# Patient Record
Sex: MALE | Race: BLACK OR AFRICAN AMERICAN | NOT HISPANIC OR LATINO | Employment: OTHER | URBAN - METROPOLITAN AREA
[De-identification: names, ages, dates, MRNs, and addresses within clinical notes are randomized per-mention and may not be internally consistent; named-entity substitution may affect disease eponyms.]

---

## 2023-01-01 ENCOUNTER — HOSPITAL ENCOUNTER (INPATIENT)
Facility: HOSPITAL | Age: 88
LOS: 12 days | Discharge: NURSING, REHAB OR LONG-TERM CARE | DRG: 871 | End: 2023-04-05
Attending: EMERGENCY MEDICINE | Admitting: INTERNAL MEDICINE
Payer: MEDICARE

## 2023-01-01 ENCOUNTER — APPOINTMENT (OUTPATIENT)
Dept: GENERAL RADIOLOGY | Facility: HOSPITAL | Age: 88
DRG: 871 | End: 2023-01-01
Payer: MEDICARE

## 2023-01-01 ENCOUNTER — HOSPITAL ENCOUNTER (INPATIENT)
Facility: HOSPITAL | Age: 88
LOS: 4 days | DRG: 871 | End: 2023-04-21
Attending: EMERGENCY MEDICINE | Admitting: INTERNAL MEDICINE
Payer: MEDICARE

## 2023-01-01 ENCOUNTER — HOSPITAL ENCOUNTER (INPATIENT)
Facility: HOSPITAL | Age: 88
LOS: 2 days | DRG: 951 | End: 2023-04-23
Attending: INTERNAL MEDICINE | Admitting: INTERNAL MEDICINE
Payer: COMMERCIAL

## 2023-01-01 ENCOUNTER — APPOINTMENT (OUTPATIENT)
Dept: CARDIOLOGY | Facility: HOSPITAL | Age: 88
DRG: 871 | End: 2023-01-01
Payer: MEDICARE

## 2023-01-01 ENCOUNTER — APPOINTMENT (OUTPATIENT)
Dept: CT IMAGING | Facility: HOSPITAL | Age: 88
DRG: 871 | End: 2023-01-01
Payer: MEDICARE

## 2023-01-01 ENCOUNTER — APPOINTMENT (OUTPATIENT)
Dept: MRI IMAGING | Facility: HOSPITAL | Age: 88
DRG: 871 | End: 2023-01-01
Payer: MEDICARE

## 2023-01-01 VITALS
WEIGHT: 190.7 LBS | OXYGEN SATURATION: 96 % | SYSTOLIC BLOOD PRESSURE: 104 MMHG | TEMPERATURE: 97.3 F | HEIGHT: 69 IN | BODY MASS INDEX: 28.24 KG/M2 | HEART RATE: 68 BPM | RESPIRATION RATE: 16 BRPM | DIASTOLIC BLOOD PRESSURE: 55 MMHG

## 2023-01-01 VITALS
RESPIRATION RATE: 16 BRPM | SYSTOLIC BLOOD PRESSURE: 103 MMHG | WEIGHT: 190 LBS | BODY MASS INDEX: 28.14 KG/M2 | TEMPERATURE: 96.8 F | HEART RATE: 109 BPM | HEIGHT: 69 IN | OXYGEN SATURATION: 93 % | DIASTOLIC BLOOD PRESSURE: 63 MMHG

## 2023-01-01 VITALS — HEART RATE: 89 BPM | RESPIRATION RATE: 5 BRPM

## 2023-01-01 DIAGNOSIS — N39.0 SEPSIS SECONDARY TO UTI: Primary | ICD-10-CM

## 2023-01-01 DIAGNOSIS — M10.9 GOUT, UNSPECIFIED CAUSE, UNSPECIFIED CHRONICITY, UNSPECIFIED SITE: ICD-10-CM

## 2023-01-01 DIAGNOSIS — Z86.59 HISTORY OF DEMENTIA: ICD-10-CM

## 2023-01-01 DIAGNOSIS — A41.9 SEPSIS, DUE TO UNSPECIFIED ORGANISM, UNSPECIFIED WHETHER ACUTE ORGAN DYSFUNCTION PRESENT: Primary | ICD-10-CM

## 2023-01-01 DIAGNOSIS — J18.9 HCAP (HEALTHCARE-ASSOCIATED PNEUMONIA): ICD-10-CM

## 2023-01-01 DIAGNOSIS — R13.10 DYSPHAGIA, UNSPECIFIED TYPE: ICD-10-CM

## 2023-01-01 DIAGNOSIS — A41.9 SEPSIS SECONDARY TO UTI: Primary | ICD-10-CM

## 2023-01-01 DIAGNOSIS — I48.91 ATRIAL FIBRILLATION WITH RVR: ICD-10-CM

## 2023-01-01 DIAGNOSIS — I48.91 NEW ONSET ATRIAL FIBRILLATION: ICD-10-CM

## 2023-01-01 DIAGNOSIS — D64.9 ANEMIA, UNSPECIFIED TYPE: ICD-10-CM

## 2023-01-01 DIAGNOSIS — R41.0 CONFUSION: ICD-10-CM

## 2023-01-01 LAB
ABO GROUP BLD: NORMAL
ABO GROUP BLD: NORMAL
ACANTHOCYTES BLD QL SMEAR: NORMAL
ALBUMIN SERPL-MCNC: 2.2 G/DL (ref 3.5–5.2)
ALBUMIN SERPL-MCNC: 2.3 G/DL (ref 3.5–5.2)
ALBUMIN SERPL-MCNC: 2.4 G/DL (ref 3.5–5.2)
ALBUMIN SERPL-MCNC: 2.6 G/DL (ref 3.5–5.2)
ALBUMIN SERPL-MCNC: 2.7 G/DL (ref 3.5–5.2)
ALBUMIN/GLOB SERPL: 0.8 G/DL
ALBUMIN/GLOB SERPL: 0.9 G/DL
ALBUMIN/GLOB SERPL: 1 G/DL
ALBUMIN/GLOB SERPL: 1 G/DL
ALBUMIN/GLOB SERPL: 1.2 G/DL
ALP SERPL-CCNC: 1111 U/L (ref 39–117)
ALP SERPL-CCNC: 1179 U/L (ref 39–117)
ALP SERPL-CCNC: 1197 U/L (ref 39–117)
ALP SERPL-CCNC: 1524 U/L (ref 39–117)
ALP SERPL-CCNC: 750 U/L (ref 39–117)
ALT SERPL W P-5'-P-CCNC: 5 U/L (ref 1–41)
ALT SERPL W P-5'-P-CCNC: 7 U/L (ref 1–41)
ALT SERPL W P-5'-P-CCNC: 7 U/L (ref 1–41)
ALT SERPL W P-5'-P-CCNC: 8 U/L (ref 1–41)
ALT SERPL W P-5'-P-CCNC: 9 U/L (ref 1–41)
AMMONIA BLD-SCNC: 46 UMOL/L (ref 16–60)
AMPHET+METHAMPHET UR QL: NEGATIVE
AMPHETAMINES UR QL: NEGATIVE
ANION GAP SERPL CALCULATED.3IONS-SCNC: 11 MMOL/L (ref 5–15)
ANION GAP SERPL CALCULATED.3IONS-SCNC: 12 MMOL/L (ref 5–15)
ANION GAP SERPL CALCULATED.3IONS-SCNC: 13 MMOL/L (ref 5–15)
ANION GAP SERPL CALCULATED.3IONS-SCNC: 13 MMOL/L (ref 5–15)
ANION GAP SERPL CALCULATED.3IONS-SCNC: 14 MMOL/L (ref 5–15)
ANION GAP SERPL CALCULATED.3IONS-SCNC: 15 MMOL/L (ref 5–15)
ANION GAP SERPL CALCULATED.3IONS-SCNC: 15 MMOL/L (ref 5–15)
ANION GAP SERPL CALCULATED.3IONS-SCNC: 18 MMOL/L (ref 5–15)
ANION GAP SERPL CALCULATED.3IONS-SCNC: 21 MMOL/L (ref 5–15)
ANION GAP SERPL CALCULATED.3IONS-SCNC: 9 MMOL/L (ref 5–15)
ANISOCYTOSIS BLD QL: NORMAL
ANISOCYTOSIS BLD QL: NORMAL
APAP SERPL-MCNC: <5 MCG/ML (ref 0–30)
ARTERIAL PATENCY WRIST A: ABNORMAL
ARTERIAL PATENCY WRIST A: ABNORMAL
AST SERPL-CCNC: 12 U/L (ref 1–40)
AST SERPL-CCNC: 19 U/L (ref 1–40)
AST SERPL-CCNC: 23 U/L (ref 1–40)
AST SERPL-CCNC: 23 U/L (ref 1–40)
AST SERPL-CCNC: 25 U/L (ref 1–40)
ATMOSPHERIC PRESS: ABNORMAL MM[HG]
B PARAPERT DNA SPEC QL NAA+PROBE: NOT DETECTED
B PERT DNA SPEC QL NAA+PROBE: NOT DETECTED
BACTERIA SPEC AEROBE CULT: ABNORMAL
BACTERIA SPEC AEROBE CULT: NO GROWTH
BACTERIA SPEC AEROBE CULT: NORMAL
BACTERIA UR QL AUTO: ABNORMAL /HPF
BACTERIA UR QL AUTO: ABNORMAL /HPF
BARBITURATES UR QL SCN: NEGATIVE
BASE EXCESS BLDA CALC-SCNC: -2.9 MMOL/L (ref 0–2)
BASE EXCESS BLDA CALC-SCNC: -6.6 MMOL/L (ref 0–2)
BASE EXCESS BLDV CALC-SCNC: -4.4 MMOL/L (ref -2–2)
BASOPHILS # BLD AUTO: 0.02 10*3/MM3 (ref 0–0.2)
BASOPHILS # BLD AUTO: 0.03 10*3/MM3 (ref 0–0.2)
BASOPHILS # BLD AUTO: 0.04 10*3/MM3 (ref 0–0.2)
BASOPHILS # BLD AUTO: 0.04 10*3/MM3 (ref 0–0.2)
BASOPHILS NFR BLD AUTO: 0.3 % (ref 0–1.5)
BASOPHILS NFR BLD AUTO: 0.4 % (ref 0–1.5)
BASOPHILS NFR BLD AUTO: 0.4 % (ref 0–1.5)
BASOPHILS NFR BLD AUTO: 0.6 % (ref 0–1.5)
BASOPHILS NFR BLD AUTO: 0.6 % (ref 0–1.5)
BASOPHILS NFR BLD AUTO: 0.7 % (ref 0–1.5)
BASOPHILS NFR BLD AUTO: 0.8 % (ref 0–1.5)
BDY SITE: ABNORMAL
BENZODIAZ UR QL SCN: POSITIVE
BH BB BLOOD EXPIRATION DATE: NORMAL
BH BB BLOOD TYPE BARCODE: 9500
BH BB DISPENSE STATUS: NORMAL
BH BB PRODUCT CODE: NORMAL
BH BB UNIT NUMBER: NORMAL
BH CV ECHO MEAS - AO MAX PG: 8.1 MMHG
BH CV ECHO MEAS - AO MEAN PG: 3.3 MMHG
BH CV ECHO MEAS - AO ROOT DIAM: 4.3 CM
BH CV ECHO MEAS - AO V2 MAX: 142.5 CM/SEC
BH CV ECHO MEAS - AO V2 VTI: 22.2 CM
BH CV ECHO MEAS - AVA(I,D): 1.14 CM2
BH CV ECHO MEAS - EDV(CUBED): 27.7 ML
BH CV ECHO MEAS - ESV(CUBED): 8.8 ML
BH CV ECHO MEAS - FS: 31.8 %
BH CV ECHO MEAS - IVS/LVPW: 0.99 CM
BH CV ECHO MEAS - IVSD: 1.06 CM
BH CV ECHO MEAS - LA DIMENSION: 3.3 CM
BH CV ECHO MEAS - LAT PEAK E' VEL: 9.2 CM/SEC
BH CV ECHO MEAS - LV MASS(C)D: 91.5 GRAMS
BH CV ECHO MEAS - LV MAX PG: 1.84 MMHG
BH CV ECHO MEAS - LV MEAN PG: 0.63 MMHG
BH CV ECHO MEAS - LV V1 MAX: 67.9 CM/SEC
BH CV ECHO MEAS - LV V1 VTI: 7.7 CM
BH CV ECHO MEAS - LVIDD: 3 CM
BH CV ECHO MEAS - LVIDS: 2.06 CM
BH CV ECHO MEAS - LVOT AREA: 3.3 CM2
BH CV ECHO MEAS - LVOT DIAM: 2.05 CM
BH CV ECHO MEAS - LVPWD: 1.07 CM
BH CV ECHO MEAS - MED PEAK E' VEL: 6.3 CM/SEC
BH CV ECHO MEAS - MV DEC SLOPE: 763.3 CM/SEC2
BH CV ECHO MEAS - MV E MAX VEL: 107 CM/SEC
BH CV ECHO MEAS - MV MAX PG: 6.2 MMHG
BH CV ECHO MEAS - MV MEAN PG: 3.3 MMHG
BH CV ECHO MEAS - MV P1/2T: 49.5 MSEC
BH CV ECHO MEAS - MV V2 VTI: 26 CM
BH CV ECHO MEAS - MVA(P1/2T): 4.4 CM2
BH CV ECHO MEAS - MVA(VTI): 0.97 CM2
BH CV ECHO MEAS - PI END-D VEL: 156.1 CM/SEC
BH CV ECHO MEAS - RAP SYSTOLE: 8 MMHG
BH CV ECHO MEAS - RVSP: 37 MMHG
BH CV ECHO MEAS - SV(LVOT): 25.2 ML
BH CV ECHO MEAS - TAPSE (>1.6): 0.98 CM
BH CV ECHO MEAS - TR MAX PG: 29.7 MMHG
BH CV ECHO MEAS - TR MAX VEL: 271.7 CM/SEC
BH CV ECHO MEASUREMENTS AVERAGE E/E' RATIO: 13.81
BH CV LOW VAS RIGHT POSTERIOR TIBIAL VESSEL: 1
BH CV LOWER VASCULAR LEFT COMMON FEMORAL AUGMENT: NORMAL
BH CV LOWER VASCULAR LEFT COMMON FEMORAL COMPETENT: NORMAL
BH CV LOWER VASCULAR LEFT COMMON FEMORAL COMPRESS: NORMAL
BH CV LOWER VASCULAR LEFT COMMON FEMORAL PHASIC: NORMAL
BH CV LOWER VASCULAR LEFT COMMON FEMORAL SPONT: NORMAL
BH CV LOWER VASCULAR LEFT DISTAL FEMORAL COMPRESS: NORMAL
BH CV LOWER VASCULAR LEFT GASTRONEMIUS COMPRESS: NORMAL
BH CV LOWER VASCULAR LEFT GREATER SAPH AK COMPRESS: NORMAL
BH CV LOWER VASCULAR LEFT GREATER SAPH BK COMPRESS: NORMAL
BH CV LOWER VASCULAR LEFT LESSER SAPH COMPRESS: NORMAL
BH CV LOWER VASCULAR LEFT MID FEMORAL AUGMENT: NORMAL
BH CV LOWER VASCULAR LEFT MID FEMORAL COMPETENT: NORMAL
BH CV LOWER VASCULAR LEFT MID FEMORAL COMPRESS: NORMAL
BH CV LOWER VASCULAR LEFT MID FEMORAL PHASIC: NORMAL
BH CV LOWER VASCULAR LEFT MID FEMORAL SPONT: NORMAL
BH CV LOWER VASCULAR LEFT PERONEAL COMPRESS: NORMAL
BH CV LOWER VASCULAR LEFT POPLITEAL AUGMENT: NORMAL
BH CV LOWER VASCULAR LEFT POPLITEAL COMPETENT: NORMAL
BH CV LOWER VASCULAR LEFT POPLITEAL COMPRESS: NORMAL
BH CV LOWER VASCULAR LEFT POPLITEAL PHASIC: NORMAL
BH CV LOWER VASCULAR LEFT POPLITEAL SPONT: NORMAL
BH CV LOWER VASCULAR LEFT POSTERIOR TIBIAL COMPRESS: NORMAL
BH CV LOWER VASCULAR LEFT PROFUNDA FEMORAL COMPRESS: NORMAL
BH CV LOWER VASCULAR LEFT PROXIMAL FEMORAL COMPRESS: NORMAL
BH CV LOWER VASCULAR LEFT SAPHENOFEMORAL JUNCTION COMPRESS: NORMAL
BH CV LOWER VASCULAR RIGHT COMMON FEMORAL AUGMENT: NORMAL
BH CV LOWER VASCULAR RIGHT COMMON FEMORAL COMPETENT: NORMAL
BH CV LOWER VASCULAR RIGHT COMMON FEMORAL COMPRESS: NORMAL
BH CV LOWER VASCULAR RIGHT COMMON FEMORAL PHASIC: NORMAL
BH CV LOWER VASCULAR RIGHT COMMON FEMORAL SPONT: NORMAL
BH CV LOWER VASCULAR RIGHT DISTAL FEMORAL COMPRESS: NORMAL
BH CV LOWER VASCULAR RIGHT GASTRONEMIUS COMPRESS: NORMAL
BH CV LOWER VASCULAR RIGHT GREATER SAPH AK COMPRESS: NORMAL
BH CV LOWER VASCULAR RIGHT GREATER SAPH BK COMPRESS: NORMAL
BH CV LOWER VASCULAR RIGHT LESSER SAPH COMPRESS: NORMAL
BH CV LOWER VASCULAR RIGHT MID FEMORAL AUGMENT: NORMAL
BH CV LOWER VASCULAR RIGHT MID FEMORAL COMPETENT: NORMAL
BH CV LOWER VASCULAR RIGHT MID FEMORAL COMPRESS: NORMAL
BH CV LOWER VASCULAR RIGHT MID FEMORAL PHASIC: NORMAL
BH CV LOWER VASCULAR RIGHT MID FEMORAL SPONT: NORMAL
BH CV LOWER VASCULAR RIGHT PERONEAL COMPRESS: NORMAL
BH CV LOWER VASCULAR RIGHT POPLITEAL AUGMENT: NORMAL
BH CV LOWER VASCULAR RIGHT POPLITEAL COMPETENT: NORMAL
BH CV LOWER VASCULAR RIGHT POPLITEAL COMPRESS: NORMAL
BH CV LOWER VASCULAR RIGHT POPLITEAL PHASIC: NORMAL
BH CV LOWER VASCULAR RIGHT POPLITEAL SPONT: NORMAL
BH CV LOWER VASCULAR RIGHT POSTERIOR TIBIAL COMPRESS: NORMAL
BH CV LOWER VASCULAR RIGHT POSTERIOR TIBIAL THROMBUS: NORMAL
BH CV LOWER VASCULAR RIGHT PROFUNDA FEMORAL COMPRESS: NORMAL
BH CV LOWER VASCULAR RIGHT PROXIMAL FEMORAL COMPRESS: NORMAL
BH CV LOWER VASCULAR RIGHT SAPHENOFEMORAL JUNCTION COMPRESS: NORMAL
BH CV XLRA - TDI S': 10.6 CM/SEC
BILIRUB SERPL-MCNC: 0.8 MG/DL (ref 0–1.2)
BILIRUB SERPL-MCNC: 1.2 MG/DL (ref 0–1.2)
BILIRUB SERPL-MCNC: 1.2 MG/DL (ref 0–1.2)
BILIRUB SERPL-MCNC: 1.5 MG/DL (ref 0–1.2)
BILIRUB SERPL-MCNC: 1.5 MG/DL (ref 0–1.2)
BILIRUB UR QL STRIP: ABNORMAL
BILIRUB UR QL STRIP: ABNORMAL
BLD GP AB SCN SERPL QL: NEGATIVE
BODY TEMPERATURE: 37 C
BUN SERPL-MCNC: 13 MG/DL (ref 8–23)
BUN SERPL-MCNC: 14 MG/DL (ref 8–23)
BUN SERPL-MCNC: 15 MG/DL (ref 8–23)
BUN SERPL-MCNC: 15 MG/DL (ref 8–23)
BUN SERPL-MCNC: 16 MG/DL (ref 8–23)
BUN SERPL-MCNC: 16 MG/DL (ref 8–23)
BUN SERPL-MCNC: 18 MG/DL (ref 8–23)
BUN SERPL-MCNC: 19 MG/DL (ref 8–23)
BUN SERPL-MCNC: 19 MG/DL (ref 8–23)
BUN SERPL-MCNC: 21 MG/DL (ref 8–23)
BUN SERPL-MCNC: 28 MG/DL (ref 8–23)
BUN SERPL-MCNC: 29 MG/DL (ref 8–23)
BUN SERPL-MCNC: 30 MG/DL (ref 8–23)
BUN/CREAT SERPL: 16.2 (ref 7–25)
BUN/CREAT SERPL: 16.3 (ref 7–25)
BUN/CREAT SERPL: 16.3 (ref 7–25)
BUN/CREAT SERPL: 18 (ref 7–25)
BUN/CREAT SERPL: 19.1 (ref 7–25)
BUN/CREAT SERPL: 19.4 (ref 7–25)
BUN/CREAT SERPL: 19.7 (ref 7–25)
BUN/CREAT SERPL: 20 (ref 7–25)
BUN/CREAT SERPL: 20.3 (ref 7–25)
BUN/CREAT SERPL: 20.7 (ref 7–25)
BUN/CREAT SERPL: 20.7 (ref 7–25)
BUN/CREAT SERPL: 21.2 (ref 7–25)
BUN/CREAT SERPL: 21.5 (ref 7–25)
BUN/CREAT SERPL: 21.6 (ref 7–25)
BUN/CREAT SERPL: 23.1 (ref 7–25)
BUPRENORPHINE SERPL-MCNC: NEGATIVE NG/ML
BURR CELLS BLD QL SMEAR: NORMAL
BURR CELLS BLD QL SMEAR: NORMAL
C PNEUM DNA NPH QL NAA+NON-PROBE: NOT DETECTED
CALCIUM SPEC-SCNC: 7.7 MG/DL (ref 8.2–9.6)
CALCIUM SPEC-SCNC: 8 MG/DL (ref 8.2–9.6)
CALCIUM SPEC-SCNC: 8.1 MG/DL (ref 8.2–9.6)
CALCIUM SPEC-SCNC: 8.1 MG/DL (ref 8.2–9.6)
CALCIUM SPEC-SCNC: 8.2 MG/DL (ref 8.2–9.6)
CALCIUM SPEC-SCNC: 8.4 MG/DL (ref 8.2–9.6)
CALCIUM SPEC-SCNC: 8.6 MG/DL (ref 8.2–9.6)
CALCIUM SPEC-SCNC: 8.6 MG/DL (ref 8.2–9.6)
CALCIUM SPEC-SCNC: 8.8 MG/DL (ref 8.2–9.6)
CALCIUM SPEC-SCNC: 9 MG/DL (ref 8.2–9.6)
CANNABINOIDS SERPL QL: NEGATIVE
CHLORIDE SERPL-SCNC: 103 MMOL/L (ref 98–107)
CHLORIDE SERPL-SCNC: 103 MMOL/L (ref 98–107)
CHLORIDE SERPL-SCNC: 107 MMOL/L (ref 98–107)
CHLORIDE SERPL-SCNC: 108 MMOL/L (ref 98–107)
CHLORIDE SERPL-SCNC: 109 MMOL/L (ref 98–107)
CHLORIDE SERPL-SCNC: 111 MMOL/L (ref 98–107)
CHLORIDE SERPL-SCNC: 112 MMOL/L (ref 98–107)
CHLORIDE SERPL-SCNC: 113 MMOL/L (ref 98–107)
CHLORIDE SERPL-SCNC: 113 MMOL/L (ref 98–107)
CHLORIDE SERPL-SCNC: 114 MMOL/L (ref 98–107)
CHLORIDE SERPL-SCNC: 114 MMOL/L (ref 98–107)
CK SERPL-CCNC: 65 U/L (ref 20–200)
CLARITY UR: ABNORMAL
CLARITY UR: CLEAR
CO2 BLDA-SCNC: 17.2 MMOL/L (ref 22–33)
CO2 BLDA-SCNC: 22 MMOL/L (ref 22–33)
CO2 BLDA-SCNC: 23.4 MMOL/L (ref 22–33)
CO2 SERPL-SCNC: 15 MMOL/L (ref 22–29)
CO2 SERPL-SCNC: 16 MMOL/L (ref 22–29)
CO2 SERPL-SCNC: 17 MMOL/L (ref 22–29)
CO2 SERPL-SCNC: 18 MMOL/L (ref 22–29)
CO2 SERPL-SCNC: 18 MMOL/L (ref 22–29)
CO2 SERPL-SCNC: 19 MMOL/L (ref 22–29)
CO2 SERPL-SCNC: 20 MMOL/L (ref 22–29)
CO2 SERPL-SCNC: 22 MMOL/L (ref 22–29)
CO2 SERPL-SCNC: 24 MMOL/L (ref 22–29)
COCAINE UR QL: NEGATIVE
COHGB MFR BLD: 1.2 % (ref 0–2)
COHGB MFR BLD: 1.4 %
COHGB MFR BLD: 1.7 % (ref 0–2)
COLOR UR: ABNORMAL
COLOR UR: YELLOW
CORTIS SERPL-MCNC: 11.44 MCG/DL
CORTIS SERPL-MCNC: 20.04 MCG/DL
CORTIS SERPL-MCNC: 22.27 MCG/DL
CREAT SERPL-MCNC: 0.65 MG/DL (ref 0.76–1.27)
CREAT SERPL-MCNC: 0.65 MG/DL (ref 0.76–1.27)
CREAT SERPL-MCNC: 0.66 MG/DL (ref 0.76–1.27)
CREAT SERPL-MCNC: 0.72 MG/DL (ref 0.76–1.27)
CREAT SERPL-MCNC: 0.75 MG/DL (ref 0.76–1.27)
CREAT SERPL-MCNC: 0.86 MG/DL (ref 0.76–1.27)
CREAT SERPL-MCNC: 0.88 MG/DL (ref 0.76–1.27)
CREAT SERPL-MCNC: 0.89 MG/DL (ref 0.76–1.27)
CREAT SERPL-MCNC: 0.92 MG/DL (ref 0.76–1.27)
CREAT SERPL-MCNC: 0.94 MG/DL (ref 0.76–1.27)
CREAT SERPL-MCNC: 0.99 MG/DL (ref 0.76–1.27)
CREAT SERPL-MCNC: 0.99 MG/DL (ref 0.76–1.27)
CREAT SERPL-MCNC: 1.4 MG/DL (ref 0.76–1.27)
CREAT SERPL-MCNC: 1.48 MG/DL (ref 0.76–1.27)
CREAT SERPL-MCNC: 1.72 MG/DL (ref 0.76–1.27)
CROSSMATCH INTERPRETATION: NORMAL
D-LACTATE SERPL-SCNC: 1.7 MMOL/L (ref 0.5–2)
D-LACTATE SERPL-SCNC: 2 MMOL/L (ref 0.5–2)
D-LACTATE SERPL-SCNC: 2.1 MMOL/L (ref 0.5–2)
D-LACTATE SERPL-SCNC: 2.3 MMOL/L (ref 0.5–2)
D-LACTATE SERPL-SCNC: 3.6 MMOL/L (ref 0.5–2)
D-LACTATE SERPL-SCNC: 3.6 MMOL/L (ref 0.5–2)
D-LACTATE SERPL-SCNC: 7.3 MMOL/L (ref 0.5–2)
DACRYOCYTES BLD QL SMEAR: NORMAL
DACRYOCYTES BLD QL SMEAR: NORMAL
DEPRECATED RDW RBC AUTO: 100.6 FL (ref 37–54)
DEPRECATED RDW RBC AUTO: 101.7 FL (ref 37–54)
DEPRECATED RDW RBC AUTO: 82.9 FL (ref 37–54)
DEPRECATED RDW RBC AUTO: 83.5 FL (ref 37–54)
DEPRECATED RDW RBC AUTO: 86.5 FL (ref 37–54)
DEPRECATED RDW RBC AUTO: 86.7 FL (ref 37–54)
DEPRECATED RDW RBC AUTO: 87.1 FL (ref 37–54)
DEPRECATED RDW RBC AUTO: 89.9 FL (ref 37–54)
DEPRECATED RDW RBC AUTO: 91.2 FL (ref 37–54)
DEPRECATED RDW RBC AUTO: 91.6 FL (ref 37–54)
DEPRECATED RDW RBC AUTO: 92.2 FL (ref 37–54)
DEPRECATED RDW RBC AUTO: 95.3 FL (ref 37–54)
DEPRECATED RDW RBC AUTO: 98.3 FL (ref 37–54)
DEPRECATED RDW RBC AUTO: 98.9 FL (ref 37–54)
EGFRCR SERPLBLD CKD-EPI 2021: 36.6 ML/MIN/1.73
EGFRCR SERPLBLD CKD-EPI 2021: 43.8 ML/MIN/1.73
EGFRCR SERPLBLD CKD-EPI 2021: 46.9 ML/MIN/1.73
EGFRCR SERPLBLD CKD-EPI 2021: 71 ML/MIN/1.73
EGFRCR SERPLBLD CKD-EPI 2021: 71 ML/MIN/1.73
EGFRCR SERPLBLD CKD-EPI 2021: 75.6 ML/MIN/1.73
EGFRCR SERPLBLD CKD-EPI 2021: 77.6 ML/MIN/1.73
EGFRCR SERPLBLD CKD-EPI 2021: 79.9 ML/MIN/1.73
EGFRCR SERPLBLD CKD-EPI 2021: 80.2 ML/MIN/1.73
EGFRCR SERPLBLD CKD-EPI 2021: 80.7 ML/MIN/1.73
EGFRCR SERPLBLD CKD-EPI 2021: 84.1 ML/MIN/1.73
EGFRCR SERPLBLD CKD-EPI 2021: 85.2 ML/MIN/1.73
EGFRCR SERPLBLD CKD-EPI 2021: 87.5 ML/MIN/1.73
EGFRCR SERPLBLD CKD-EPI 2021: 87.9 ML/MIN/1.73
EGFRCR SERPLBLD CKD-EPI 2021: 87.9 ML/MIN/1.73
EOSINOPHIL # BLD AUTO: 0 10*3/MM3 (ref 0–0.4)
EOSINOPHIL # BLD AUTO: 0.01 10*3/MM3 (ref 0–0.4)
EOSINOPHIL # BLD AUTO: 0.02 10*3/MM3 (ref 0–0.4)
EOSINOPHIL # BLD AUTO: 0.03 10*3/MM3 (ref 0–0.4)
EOSINOPHIL # BLD AUTO: 0.34 10*3/MM3 (ref 0–0.4)
EOSINOPHIL NFR BLD AUTO: 0 % (ref 0.3–6.2)
EOSINOPHIL NFR BLD AUTO: 0.1 % (ref 0.3–6.2)
EOSINOPHIL NFR BLD AUTO: 0.4 % (ref 0.3–6.2)
EOSINOPHIL NFR BLD AUTO: 0.5 % (ref 0.3–6.2)
EOSINOPHIL NFR BLD AUTO: 0.6 % (ref 0.3–6.2)
EOSINOPHIL NFR BLD AUTO: 0.8 % (ref 0.3–6.2)
EOSINOPHIL NFR BLD AUTO: 3.1 % (ref 0.3–6.2)
EPAP: 0
ERYTHROCYTE [DISTWIDTH] IN BLOOD BY AUTOMATED COUNT: 23.4 % (ref 12.3–15.4)
ERYTHROCYTE [DISTWIDTH] IN BLOOD BY AUTOMATED COUNT: 23.4 % (ref 12.3–15.4)
ERYTHROCYTE [DISTWIDTH] IN BLOOD BY AUTOMATED COUNT: 23.5 % (ref 12.3–15.4)
ERYTHROCYTE [DISTWIDTH] IN BLOOD BY AUTOMATED COUNT: 23.6 % (ref 12.3–15.4)
ERYTHROCYTE [DISTWIDTH] IN BLOOD BY AUTOMATED COUNT: 23.7 % (ref 12.3–15.4)
ERYTHROCYTE [DISTWIDTH] IN BLOOD BY AUTOMATED COUNT: 23.7 % (ref 12.3–15.4)
ERYTHROCYTE [DISTWIDTH] IN BLOOD BY AUTOMATED COUNT: 23.8 % (ref 12.3–15.4)
ERYTHROCYTE [DISTWIDTH] IN BLOOD BY AUTOMATED COUNT: 23.9 % (ref 12.3–15.4)
ERYTHROCYTE [DISTWIDTH] IN BLOOD BY AUTOMATED COUNT: 24 % (ref 12.3–15.4)
ERYTHROCYTE [DISTWIDTH] IN BLOOD BY AUTOMATED COUNT: 25.3 % (ref 12.3–15.4)
ERYTHROCYTE [DISTWIDTH] IN BLOOD BY AUTOMATED COUNT: 25.5 % (ref 12.3–15.4)
ERYTHROCYTE [DISTWIDTH] IN BLOOD BY AUTOMATED COUNT: 25.6 % (ref 12.3–15.4)
ERYTHROCYTE [DISTWIDTH] IN BLOOD BY AUTOMATED COUNT: 25.7 % (ref 12.3–15.4)
ERYTHROCYTE [DISTWIDTH] IN BLOOD BY AUTOMATED COUNT: 25.7 % (ref 12.3–15.4)
ERYTHROCYTE [SEDIMENTATION RATE] IN BLOOD: 12 MM/HR (ref 0–20)
ETHANOL BLD-MCNC: <10 MG/DL (ref 0–10)
FLUAV SUBTYP SPEC NAA+PROBE: NOT DETECTED
FLUBV RNA ISLT QL NAA+PROBE: NOT DETECTED
FOLATE SERPL-MCNC: 10.7 NG/ML (ref 4.78–24.2)
FOLATE SERPL-MCNC: >20 NG/ML (ref 4.78–24.2)
GEN 5 2HR TROPONIN T REFLEX: 45 NG/L
GLOBULIN UR ELPH-MCNC: 2.1 GM/DL
GLOBULIN UR ELPH-MCNC: 2.3 GM/DL
GLOBULIN UR ELPH-MCNC: 2.7 GM/DL
GLOBULIN UR ELPH-MCNC: 2.7 GM/DL
GLOBULIN UR ELPH-MCNC: 2.8 GM/DL
GLUCOSE BLDC GLUCOMTR-MCNC: 101 MG/DL (ref 70–130)
GLUCOSE BLDC GLUCOMTR-MCNC: 101 MG/DL (ref 70–130)
GLUCOSE BLDC GLUCOMTR-MCNC: 114 MG/DL (ref 70–130)
GLUCOSE BLDC GLUCOMTR-MCNC: 114 MG/DL (ref 70–130)
GLUCOSE BLDC GLUCOMTR-MCNC: 123 MG/DL (ref 70–130)
GLUCOSE BLDC GLUCOMTR-MCNC: 124 MG/DL (ref 70–130)
GLUCOSE BLDC GLUCOMTR-MCNC: 125 MG/DL (ref 70–130)
GLUCOSE BLDC GLUCOMTR-MCNC: 125 MG/DL (ref 70–130)
GLUCOSE BLDC GLUCOMTR-MCNC: 127 MG/DL (ref 70–130)
GLUCOSE BLDC GLUCOMTR-MCNC: 131 MG/DL (ref 70–130)
GLUCOSE BLDC GLUCOMTR-MCNC: 147 MG/DL (ref 70–130)
GLUCOSE BLDC GLUCOMTR-MCNC: 150 MG/DL (ref 70–130)
GLUCOSE BLDC GLUCOMTR-MCNC: 150 MG/DL (ref 70–130)
GLUCOSE BLDC GLUCOMTR-MCNC: 156 MG/DL (ref 70–130)
GLUCOSE BLDC GLUCOMTR-MCNC: 159 MG/DL (ref 70–130)
GLUCOSE BLDC GLUCOMTR-MCNC: 162 MG/DL (ref 70–130)
GLUCOSE BLDC GLUCOMTR-MCNC: 166 MG/DL (ref 70–130)
GLUCOSE BLDC GLUCOMTR-MCNC: 21 MG/DL (ref 70–130)
GLUCOSE BLDC GLUCOMTR-MCNC: 269 MG/DL (ref 70–130)
GLUCOSE BLDC GLUCOMTR-MCNC: 38 MG/DL (ref 70–130)
GLUCOSE BLDC GLUCOMTR-MCNC: 43 MG/DL (ref 70–130)
GLUCOSE BLDC GLUCOMTR-MCNC: 56 MG/DL (ref 70–130)
GLUCOSE BLDC GLUCOMTR-MCNC: 57 MG/DL (ref 70–130)
GLUCOSE BLDC GLUCOMTR-MCNC: 71 MG/DL (ref 70–130)
GLUCOSE BLDC GLUCOMTR-MCNC: 81 MG/DL (ref 70–130)
GLUCOSE BLDC GLUCOMTR-MCNC: 82 MG/DL (ref 70–130)
GLUCOSE BLDC GLUCOMTR-MCNC: 84 MG/DL (ref 70–130)
GLUCOSE BLDC GLUCOMTR-MCNC: 85 MG/DL (ref 70–130)
GLUCOSE BLDC GLUCOMTR-MCNC: 85 MG/DL (ref 70–130)
GLUCOSE BLDC GLUCOMTR-MCNC: 91 MG/DL (ref 70–130)
GLUCOSE BLDC GLUCOMTR-MCNC: 92 MG/DL (ref 70–130)
GLUCOSE BLDC GLUCOMTR-MCNC: 95 MG/DL (ref 70–130)
GLUCOSE BLDC GLUCOMTR-MCNC: 98 MG/DL (ref 70–130)
GLUCOSE BLDC GLUCOMTR-MCNC: 99 MG/DL (ref 70–130)
GLUCOSE SERPL-MCNC: 112 MG/DL (ref 65–99)
GLUCOSE SERPL-MCNC: 121 MG/DL (ref 65–99)
GLUCOSE SERPL-MCNC: 124 MG/DL (ref 65–99)
GLUCOSE SERPL-MCNC: 135 MG/DL (ref 65–99)
GLUCOSE SERPL-MCNC: 152 MG/DL (ref 65–99)
GLUCOSE SERPL-MCNC: 56 MG/DL (ref 65–99)
GLUCOSE SERPL-MCNC: 60 MG/DL (ref 65–99)
GLUCOSE SERPL-MCNC: 62 MG/DL (ref 65–99)
GLUCOSE SERPL-MCNC: 69 MG/DL (ref 65–99)
GLUCOSE SERPL-MCNC: 73 MG/DL (ref 65–99)
GLUCOSE SERPL-MCNC: 76 MG/DL (ref 65–99)
GLUCOSE SERPL-MCNC: 86 MG/DL (ref 65–99)
GLUCOSE SERPL-MCNC: 92 MG/DL (ref 65–99)
GLUCOSE SERPL-MCNC: 94 MG/DL (ref 65–99)
GLUCOSE SERPL-MCNC: 97 MG/DL (ref 65–99)
GLUCOSE UR STRIP-MCNC: NEGATIVE MG/DL
GLUCOSE UR STRIP-MCNC: NEGATIVE MG/DL
HADV DNA SPEC NAA+PROBE: NOT DETECTED
HCO3 BLDA-SCNC: 16.4 MMOL/L (ref 20–26)
HCO3 BLDA-SCNC: 22.2 MMOL/L (ref 20–26)
HCO3 BLDV-SCNC: 20.8 MMOL/L (ref 22–28)
HCOV 229E RNA SPEC QL NAA+PROBE: NOT DETECTED
HCOV HKU1 RNA SPEC QL NAA+PROBE: NOT DETECTED
HCOV NL63 RNA SPEC QL NAA+PROBE: NOT DETECTED
HCOV OC43 RNA SPEC QL NAA+PROBE: NOT DETECTED
HCT VFR BLD AUTO: 25.4 % (ref 37.5–51)
HCT VFR BLD AUTO: 25.5 % (ref 37.5–51)
HCT VFR BLD AUTO: 25.9 % (ref 37.5–51)
HCT VFR BLD AUTO: 26.3 % (ref 37.5–51)
HCT VFR BLD AUTO: 26.7 % (ref 37.5–51)
HCT VFR BLD AUTO: 27.6 % (ref 37.5–51)
HCT VFR BLD AUTO: 28.2 % (ref 37.5–51)
HCT VFR BLD AUTO: 28.5 % (ref 37.5–51)
HCT VFR BLD AUTO: 28.7 % (ref 37.5–51)
HCT VFR BLD AUTO: 29.4 % (ref 37.5–51)
HCT VFR BLD AUTO: 30.4 % (ref 37.5–51)
HCT VFR BLD AUTO: 30.5 % (ref 37.5–51)
HCT VFR BLD AUTO: 32 % (ref 37.5–51)
HCT VFR BLD AUTO: 33.9 % (ref 37.5–51)
HCT VFR BLD CALC: 27.8 % (ref 38–51)
HCT VFR BLD CALC: 29.3 % (ref 38–51)
HEMOCCULT STL QL: NEGATIVE
HGB BLD-MCNC: 10.1 G/DL (ref 13–17.7)
HGB BLD-MCNC: 7.7 G/DL (ref 13–17.7)
HGB BLD-MCNC: 7.7 G/DL (ref 13–17.7)
HGB BLD-MCNC: 7.8 G/DL (ref 13–17.7)
HGB BLD-MCNC: 8.4 G/DL (ref 13–17.7)
HGB BLD-MCNC: 8.5 G/DL (ref 13–17.7)
HGB BLD-MCNC: 8.6 G/DL (ref 13–17.7)
HGB BLD-MCNC: 8.7 G/DL (ref 13–17.7)
HGB BLD-MCNC: 8.8 G/DL (ref 13–17.7)
HGB BLD-MCNC: 9.2 G/DL (ref 13–17.7)
HGB BLDA-MCNC: 8.1 G/DL (ref 13.5–17.5)
HGB BLDA-MCNC: 9.1 G/DL (ref 13.5–17.5)
HGB BLDA-MCNC: 9.5 G/DL (ref 13.5–17.5)
HGB UR QL STRIP.AUTO: ABNORMAL
HGB UR QL STRIP.AUTO: ABNORMAL
HMPV RNA NPH QL NAA+NON-PROBE: NOT DETECTED
HOLD SPECIMEN: NORMAL
HPIV1 RNA ISLT QL NAA+PROBE: NOT DETECTED
HPIV2 RNA SPEC QL NAA+PROBE: NOT DETECTED
HPIV3 RNA NPH QL NAA+PROBE: NOT DETECTED
HPIV4 P GENE NPH QL NAA+PROBE: NOT DETECTED
HYALINE CASTS UR QL AUTO: ABNORMAL /LPF
HYALINE CASTS UR QL AUTO: ABNORMAL /LPF
HYPOCHROMIA BLD QL: NORMAL
IMM GRANULOCYTES # BLD AUTO: 0.02 10*3/MM3 (ref 0–0.05)
IMM GRANULOCYTES # BLD AUTO: 0.08 10*3/MM3 (ref 0–0.05)
IMM GRANULOCYTES # BLD AUTO: 0.08 10*3/MM3 (ref 0–0.05)
IMM GRANULOCYTES # BLD AUTO: 0.09 10*3/MM3 (ref 0–0.05)
IMM GRANULOCYTES # BLD AUTO: 0.09 10*3/MM3 (ref 0–0.05)
IMM GRANULOCYTES # BLD AUTO: 0.15 10*3/MM3 (ref 0–0.05)
IMM GRANULOCYTES # BLD AUTO: 0.16 10*3/MM3 (ref 0–0.05)
IMM GRANULOCYTES NFR BLD AUTO: 0.5 % (ref 0–0.5)
IMM GRANULOCYTES NFR BLD AUTO: 1.1 % (ref 0–0.5)
IMM GRANULOCYTES NFR BLD AUTO: 1.5 % (ref 0–0.5)
IMM GRANULOCYTES NFR BLD AUTO: 1.5 % (ref 0–0.5)
IMM GRANULOCYTES NFR BLD AUTO: 1.7 % (ref 0–0.5)
IMM GRANULOCYTES NFR BLD AUTO: 1.8 % (ref 0–0.5)
IMM GRANULOCYTES NFR BLD AUTO: 1.9 % (ref 0–0.5)
INHALED O2 CONCENTRATION: 100 %
INHALED O2 CONCENTRATION: 21 %
INHALED O2 CONCENTRATION: 21 %
INR PPP: 1.58 (ref 0.84–1.13)
IPAP: 0
IRON 24H UR-MRATE: 60 MCG/DL (ref 59–158)
IRON SATN MFR SERPL: 67 % (ref 20–50)
KETONES UR QL STRIP: ABNORMAL
KETONES UR QL STRIP: NEGATIVE
LARGE PLATELETS: NORMAL
LDH SERPL-CCNC: 523 U/L (ref 135–225)
LEUKOCYTE ESTERASE UR QL STRIP.AUTO: ABNORMAL
LEUKOCYTE ESTERASE UR QL STRIP.AUTO: NEGATIVE
LV EF 2D ECHO EST: 60 %
LYMPHOCYTES # BLD AUTO: 1.18 10*3/MM3 (ref 0.7–3.1)
LYMPHOCYTES # BLD AUTO: 1.52 10*3/MM3 (ref 0.7–3.1)
LYMPHOCYTES # BLD AUTO: 1.56 10*3/MM3 (ref 0.7–3.1)
LYMPHOCYTES # BLD AUTO: 1.63 10*3/MM3 (ref 0.7–3.1)
LYMPHOCYTES # BLD AUTO: 1.64 10*3/MM3 (ref 0.7–3.1)
LYMPHOCYTES # BLD AUTO: 1.65 10*3/MM3 (ref 0.7–3.1)
LYMPHOCYTES # BLD AUTO: 2.27 10*3/MM3 (ref 0.7–3.1)
LYMPHOCYTES NFR BLD AUTO: 15 % (ref 19.6–45.3)
LYMPHOCYTES NFR BLD AUTO: 18.2 % (ref 19.6–45.3)
LYMPHOCYTES NFR BLD AUTO: 22.9 % (ref 19.6–45.3)
LYMPHOCYTES NFR BLD AUTO: 29.8 % (ref 19.6–45.3)
LYMPHOCYTES NFR BLD AUTO: 30 % (ref 19.6–45.3)
LYMPHOCYTES NFR BLD AUTO: 31.7 % (ref 19.6–45.3)
LYMPHOCYTES NFR BLD AUTO: 42.9 % (ref 19.6–45.3)
Lab: ABNORMAL
M PNEUMO IGG SER IA-ACNC: NOT DETECTED
MACROCYTES BLD QL SMEAR: NORMAL
MACROCYTES BLD QL SMEAR: NORMAL
MAGNESIUM SERPL-MCNC: 1.6 MG/DL (ref 1.7–2.3)
MAGNESIUM SERPL-MCNC: 1.6 MG/DL (ref 1.7–2.3)
MAGNESIUM SERPL-MCNC: 1.8 MG/DL (ref 1.7–2.3)
MAGNESIUM SERPL-MCNC: 1.9 MG/DL (ref 1.7–2.3)
MAGNESIUM SERPL-MCNC: 1.9 MG/DL (ref 1.7–2.3)
MAGNESIUM SERPL-MCNC: 2 MG/DL (ref 1.7–2.3)
MAGNESIUM SERPL-MCNC: 2.2 MG/DL (ref 1.7–2.3)
MAGNESIUM SERPL-MCNC: 2.4 MG/DL (ref 1.7–2.3)
MAXIMAL PREDICTED HEART RATE: 127 BPM
MAXIMAL PREDICTED HEART RATE: 127 BPM
MCH RBC QN AUTO: 29.5 PG (ref 26.6–33)
MCH RBC QN AUTO: 29.7 PG (ref 26.6–33)
MCH RBC QN AUTO: 29.7 PG (ref 26.6–33)
MCH RBC QN AUTO: 29.9 PG (ref 26.6–33)
MCH RBC QN AUTO: 29.9 PG (ref 26.6–33)
MCH RBC QN AUTO: 30 PG (ref 26.6–33)
MCH RBC QN AUTO: 30.2 PG (ref 26.6–33)
MCH RBC QN AUTO: 30.3 PG (ref 26.6–33)
MCH RBC QN AUTO: 30.4 PG (ref 26.6–33)
MCH RBC QN AUTO: 30.4 PG (ref 26.6–33)
MCH RBC QN AUTO: 30.5 PG (ref 26.6–33)
MCH RBC QN AUTO: 30.7 PG (ref 26.6–33)
MCHC RBC AUTO-ENTMCNC: 28.5 G/DL (ref 31.5–35.7)
MCHC RBC AUTO-ENTMCNC: 28.6 G/DL (ref 31.5–35.7)
MCHC RBC AUTO-ENTMCNC: 28.8 G/DL (ref 31.5–35.7)
MCHC RBC AUTO-ENTMCNC: 29.2 G/DL (ref 31.5–35.7)
MCHC RBC AUTO-ENTMCNC: 29.3 G/DL (ref 31.5–35.7)
MCHC RBC AUTO-ENTMCNC: 29.3 G/DL (ref 31.5–35.7)
MCHC RBC AUTO-ENTMCNC: 29.7 G/DL (ref 31.5–35.7)
MCHC RBC AUTO-ENTMCNC: 29.7 G/DL (ref 31.5–35.7)
MCHC RBC AUTO-ENTMCNC: 29.8 G/DL (ref 31.5–35.7)
MCHC RBC AUTO-ENTMCNC: 30.3 G/DL (ref 31.5–35.7)
MCHC RBC AUTO-ENTMCNC: 30.6 G/DL (ref 31.5–35.7)
MCHC RBC AUTO-ENTMCNC: 30.8 G/DL (ref 31.5–35.7)
MCHC RBC AUTO-ENTMCNC: 30.9 G/DL (ref 31.5–35.7)
MCHC RBC AUTO-ENTMCNC: 30.9 G/DL (ref 31.5–35.7)
MCV RBC AUTO: 100.4 FL (ref 79–97)
MCV RBC AUTO: 100.7 FL (ref 79–97)
MCV RBC AUTO: 102 FL (ref 79–97)
MCV RBC AUTO: 102.7 FL (ref 79–97)
MCV RBC AUTO: 103 FL (ref 79–97)
MCV RBC AUTO: 104.3 FL (ref 79–97)
MCV RBC AUTO: 104.3 FL (ref 79–97)
MCV RBC AUTO: 104.5 FL (ref 79–97)
MCV RBC AUTO: 104.8 FL (ref 79–97)
MCV RBC AUTO: 105.6 FL (ref 79–97)
MCV RBC AUTO: 96.5 FL (ref 79–97)
MCV RBC AUTO: 97 FL (ref 79–97)
MCV RBC AUTO: 97.2 FL (ref 79–97)
MCV RBC AUTO: 97.9 FL (ref 79–97)
METHADONE UR QL SCN: NEGATIVE
METHGB BLD QL: 0.4 % (ref 0–1.5)
METHGB BLD QL: 0.6 % (ref 0–1.5)
METHGB BLD QL: 0.7 %
MODALITY: ABNORMAL
MONOCYTES # BLD AUTO: 0.58 10*3/MM3 (ref 0.1–0.9)
MONOCYTES # BLD AUTO: 0.73 10*3/MM3 (ref 0.1–0.9)
MONOCYTES # BLD AUTO: 0.74 10*3/MM3 (ref 0.1–0.9)
MONOCYTES # BLD AUTO: 0.75 10*3/MM3 (ref 0.1–0.9)
MONOCYTES # BLD AUTO: 1.05 10*3/MM3 (ref 0.1–0.9)
MONOCYTES # BLD AUTO: 1.24 10*3/MM3 (ref 0.1–0.9)
MONOCYTES # BLD AUTO: 1.42 10*3/MM3 (ref 0.1–0.9)
MONOCYTES NFR BLD AUTO: 12.9 % (ref 5–12)
MONOCYTES NFR BLD AUTO: 13.6 % (ref 5–12)
MONOCYTES NFR BLD AUTO: 14.2 % (ref 5–12)
MONOCYTES NFR BLD AUTO: 14.5 % (ref 5–12)
MONOCYTES NFR BLD AUTO: 14.6 % (ref 5–12)
MONOCYTES NFR BLD AUTO: 14.7 % (ref 5–12)
MONOCYTES NFR BLD AUTO: 15.2 % (ref 5–12)
MRSA DNA SPEC QL NAA+PROBE: NEGATIVE
MRSA DNA SPEC QL NAA+PROBE: NEGATIVE
NEUTROPHILS NFR BLD AUTO: 2.12 10*3/MM3 (ref 1.7–7)
NEUTROPHILS NFR BLD AUTO: 2.13 10*3/MM3 (ref 1.7–7)
NEUTROPHILS NFR BLD AUTO: 2.4 10*3/MM3 (ref 1.7–7)
NEUTROPHILS NFR BLD AUTO: 2.93 10*3/MM3 (ref 1.7–7)
NEUTROPHILS NFR BLD AUTO: 4.35 10*3/MM3 (ref 1.7–7)
NEUTROPHILS NFR BLD AUTO: 40.2 % (ref 42.7–76)
NEUTROPHILS NFR BLD AUTO: 5.58 10*3/MM3 (ref 1.7–7)
NEUTROPHILS NFR BLD AUTO: 50 % (ref 42.7–76)
NEUTROPHILS NFR BLD AUTO: 53.5 % (ref 42.7–76)
NEUTROPHILS NFR BLD AUTO: 53.7 % (ref 42.7–76)
NEUTROPHILS NFR BLD AUTO: 61 % (ref 42.7–76)
NEUTROPHILS NFR BLD AUTO: 65 % (ref 42.7–76)
NEUTROPHILS NFR BLD AUTO: 67.1 % (ref 42.7–76)
NEUTROPHILS NFR BLD AUTO: 7.36 10*3/MM3 (ref 1.7–7)
NITRITE UR QL STRIP: POSITIVE
NITRITE UR QL STRIP: POSITIVE
NOTE: ABNORMAL
NOTIFIED BY: ABNORMAL
NOTIFIED WHO: ABNORMAL
NRBC BLD AUTO-RTO: 0.5 /100 WBC (ref 0–0.2)
NRBC BLD AUTO-RTO: 0.8 /100 WBC (ref 0–0.2)
NRBC BLD AUTO-RTO: 0.9 /100 WBC (ref 0–0.2)
NRBC BLD AUTO-RTO: 1.3 /100 WBC (ref 0–0.2)
NRBC BLD AUTO-RTO: 2 /100 WBC (ref 0–0.2)
NRBC BLD AUTO-RTO: 2.5 /100 WBC (ref 0–0.2)
NRBC BLD AUTO-RTO: 2.6 /100 WBC (ref 0–0.2)
NT-PROBNP SERPL-MCNC: ABNORMAL PG/ML (ref 0–1800)
OPIATES UR QL: NEGATIVE
OVALOCYTES BLD QL SMEAR: NORMAL
OVALOCYTES BLD QL SMEAR: NORMAL
OXYCODONE UR QL SCN: NEGATIVE
OXYHGB MFR BLDV: 15.9 % (ref 94–99)
OXYHGB MFR BLDV: 48.6 %
OXYHGB MFR BLDV: 98.6 % (ref 94–99)
PAW @ PEAK INSP FLOW SETTING VENT: 0 CMH2O
PCO2 BLDA: 24.2 MM HG (ref 35–45)
PCO2 BLDA: 39.1 MM HG (ref 35–45)
PCO2 BLDV: 37.7 MM HG (ref 41–51)
PCO2 TEMP ADJ BLD: 24.2 MM HG (ref 35–48)
PCO2 TEMP ADJ BLD: 39.1 MM HG (ref 35–48)
PCP UR QL SCN: NEGATIVE
PH BLDA: 7.36 PH UNITS (ref 7.35–7.45)
PH BLDA: 7.44 PH UNITS (ref 7.35–7.45)
PH BLDV: 7.35 PH UNITS (ref 7.31–7.41)
PH UR STRIP.AUTO: 5.5 [PH] (ref 5–8)
PH UR STRIP.AUTO: <=5 [PH] (ref 5–8)
PH, TEMP CORRECTED: 7.36 PH UNITS
PH, TEMP CORRECTED: 7.44 PH UNITS
PHOSPHATE SERPL-MCNC: 1.6 MG/DL (ref 2.5–4.5)
PHOSPHATE SERPL-MCNC: 1.9 MG/DL (ref 2.5–4.5)
PHOSPHATE SERPL-MCNC: 2.5 MG/DL (ref 2.5–4.5)
PHOSPHATE SERPL-MCNC: 2.5 MG/DL (ref 2.5–4.5)
PHOSPHATE SERPL-MCNC: 2.7 MG/DL (ref 2.5–4.5)
PHOSPHATE SERPL-MCNC: 2.8 MG/DL (ref 2.5–4.5)
PHOSPHATE SERPL-MCNC: 2.9 MG/DL (ref 2.5–4.5)
PHOSPHATE SERPL-MCNC: 2.9 MG/DL (ref 2.5–4.5)
PHOSPHATE SERPL-MCNC: 3 MG/DL (ref 2.5–4.5)
PLAT MORPH BLD: NORMAL
PLATELET # BLD AUTO: 114 10*3/MM3 (ref 140–450)
PLATELET # BLD AUTO: 116 10*3/MM3 (ref 140–450)
PLATELET # BLD AUTO: 125 10*3/MM3 (ref 140–450)
PLATELET # BLD AUTO: 131 10*3/MM3 (ref 140–450)
PLATELET # BLD AUTO: 133 10*3/MM3 (ref 140–450)
PLATELET # BLD AUTO: 136 10*3/MM3 (ref 140–450)
PLATELET # BLD AUTO: 139 10*3/MM3 (ref 140–450)
PLATELET # BLD AUTO: 141 10*3/MM3 (ref 140–450)
PLATELET # BLD AUTO: 160 10*3/MM3 (ref 140–450)
PLATELET # BLD AUTO: 162 10*3/MM3 (ref 140–450)
PLATELET # BLD AUTO: 207 10*3/MM3 (ref 140–450)
PLATELET # BLD AUTO: 255 10*3/MM3 (ref 140–450)
PLATELET # BLD AUTO: 311 10*3/MM3 (ref 140–450)
PLATELET # BLD AUTO: 99 10*3/MM3 (ref 140–450)
PMV BLD AUTO: 10 FL (ref 6–12)
PMV BLD AUTO: 10.1 FL (ref 6–12)
PMV BLD AUTO: 10.4 FL (ref 6–12)
PMV BLD AUTO: 10.8 FL (ref 6–12)
PMV BLD AUTO: 11 FL (ref 6–12)
PMV BLD AUTO: 9.1 FL (ref 6–12)
PMV BLD AUTO: 9.8 FL (ref 6–12)
PMV BLD AUTO: 9.8 FL (ref 6–12)
PMV BLD AUTO: 9.9 FL (ref 6–12)
PO2 BLDA: 16.8 MM HG (ref 83–108)
PO2 BLDA: 231 MM HG (ref 83–108)
PO2 BLDV: 31.1 MM HG (ref 27–53)
PO2 TEMP ADJ BLD: 16.8 MM HG (ref 83–108)
PO2 TEMP ADJ BLD: 231 MM HG (ref 83–108)
POTASSIUM SERPL-SCNC: 3.1 MMOL/L (ref 3.5–5.2)
POTASSIUM SERPL-SCNC: 3.4 MMOL/L (ref 3.5–5.2)
POTASSIUM SERPL-SCNC: 3.4 MMOL/L (ref 3.5–5.2)
POTASSIUM SERPL-SCNC: 3.6 MMOL/L (ref 3.5–5.2)
POTASSIUM SERPL-SCNC: 3.7 MMOL/L (ref 3.5–5.2)
POTASSIUM SERPL-SCNC: 3.7 MMOL/L (ref 3.5–5.2)
POTASSIUM SERPL-SCNC: 3.8 MMOL/L (ref 3.5–5.2)
POTASSIUM SERPL-SCNC: 3.9 MMOL/L (ref 3.5–5.2)
POTASSIUM SERPL-SCNC: 3.9 MMOL/L (ref 3.5–5.2)
POTASSIUM SERPL-SCNC: 4 MMOL/L (ref 3.5–5.2)
POTASSIUM SERPL-SCNC: 4 MMOL/L (ref 3.5–5.2)
POTASSIUM SERPL-SCNC: 4.1 MMOL/L (ref 3.5–5.2)
POTASSIUM SERPL-SCNC: 4.4 MMOL/L (ref 3.5–5.2)
POTASSIUM SERPL-SCNC: 4.5 MMOL/L (ref 3.5–5.2)
POTASSIUM SERPL-SCNC: 4.6 MMOL/L (ref 3.5–5.2)
POTASSIUM SERPL-SCNC: 5 MMOL/L (ref 3.5–5.2)
PROCALCITONIN SERPL-MCNC: 0.77 NG/ML (ref 0–0.25)
PROCALCITONIN SERPL-MCNC: 0.96 NG/ML (ref 0–0.25)
PROCALCITONIN SERPL-MCNC: 1.31 NG/ML (ref 0–0.25)
PROPOXYPH UR QL: NEGATIVE
PROT SERPL-MCNC: 4.5 G/DL (ref 6–8.5)
PROT SERPL-MCNC: 4.7 G/DL (ref 6–8.5)
PROT SERPL-MCNC: 5.1 G/DL (ref 6–8.5)
PROT SERPL-MCNC: 5.1 G/DL (ref 6–8.5)
PROT SERPL-MCNC: 5.4 G/DL (ref 6–8.5)
PROT UR QL STRIP: ABNORMAL
PROT UR QL STRIP: ABNORMAL
PROTHROMBIN TIME: 18.9 SECONDS (ref 11.4–14.4)
QT INTERVAL: 290 MS
QT INTERVAL: 340 MS
QT INTERVAL: 360 MS
QT INTERVAL: 362 MS
QTC INTERVAL: 394 MS
QTC INTERVAL: 440 MS
QTC INTERVAL: 466 MS
QTC INTERVAL: 540 MS
RBC # BLD AUTO: 2.53 10*6/MM3 (ref 4.14–5.8)
RBC # BLD AUTO: 2.54 10*6/MM3 (ref 4.14–5.8)
RBC # BLD AUTO: 2.56 10*6/MM3 (ref 4.14–5.8)
RBC # BLD AUTO: 2.56 10*6/MM3 (ref 4.14–5.8)
RBC # BLD AUTO: 2.63 10*6/MM3 (ref 4.14–5.8)
RBC # BLD AUTO: 2.82 10*6/MM3 (ref 4.14–5.8)
RBC # BLD AUTO: 2.85 10*6/MM3 (ref 4.14–5.8)
RBC # BLD AUTO: 2.86 10*6/MM3 (ref 4.14–5.8)
RBC # BLD AUTO: 2.9 10*6/MM3 (ref 4.14–5.8)
RBC # BLD AUTO: 2.91 10*6/MM3 (ref 4.14–5.8)
RBC # BLD AUTO: 3.03 10*6/MM3 (ref 4.14–5.8)
RBC # BLD AUTO: 3.29 10*6/MM3 (ref 4.14–5.8)
RBC # UR STRIP: ABNORMAL /HPF
RBC # UR STRIP: ABNORMAL /HPF
REF LAB TEST METHOD: ABNORMAL
REF LAB TEST METHOD: ABNORMAL
RETICS # AUTO: 0.03 10*6/MM3 (ref 0.02–0.13)
RETICS/RBC NFR AUTO: 1.36 % (ref 0.7–1.9)
RH BLD: POSITIVE
RH BLD: POSITIVE
RHINOVIRUS RNA SPEC NAA+PROBE: NOT DETECTED
RSV RNA NPH QL NAA+NON-PROBE: NOT DETECTED
SALICYLATES SERPL-MCNC: <0.3 MG/DL
SARS-COV-2 RNA NPH QL NAA+NON-PROBE: NOT DETECTED
SODIUM SERPL-SCNC: 135 MMOL/L (ref 136–145)
SODIUM SERPL-SCNC: 138 MMOL/L (ref 136–145)
SODIUM SERPL-SCNC: 139 MMOL/L (ref 136–145)
SODIUM SERPL-SCNC: 140 MMOL/L (ref 136–145)
SODIUM SERPL-SCNC: 141 MMOL/L (ref 136–145)
SODIUM SERPL-SCNC: 141 MMOL/L (ref 136–145)
SODIUM SERPL-SCNC: 142 MMOL/L (ref 136–145)
SODIUM SERPL-SCNC: 142 MMOL/L (ref 136–145)
SODIUM SERPL-SCNC: 143 MMOL/L (ref 136–145)
SODIUM SERPL-SCNC: 144 MMOL/L (ref 136–145)
SODIUM SERPL-SCNC: 145 MMOL/L (ref 136–145)
SODIUM SERPL-SCNC: 145 MMOL/L (ref 136–145)
SODIUM SERPL-SCNC: 148 MMOL/L (ref 136–145)
SP GR UR STRIP: 1.02 (ref 1–1.03)
SP GR UR STRIP: 1.02 (ref 1–1.03)
SQUAMOUS #/AREA URNS HPF: ABNORMAL /HPF
SQUAMOUS #/AREA URNS HPF: ABNORMAL /HPF
STRESS TARGET HR: 108 BPM
STRESS TARGET HR: 108 BPM
T&S EXPIRATION DATE: NORMAL
T4 FREE SERPL-MCNC: 1.02 NG/DL (ref 0.93–1.7)
T4 FREE SERPL-MCNC: 1.16 NG/DL (ref 0.93–1.7)
TARGETS BLD QL SMEAR: NORMAL
TIBC SERPL-MCNC: 89 MCG/DL (ref 298–536)
TOTAL RATE: 0 BREATHS/MINUTE
TRANSFERRIN SERPL-MCNC: 60 MG/DL (ref 200–360)
TRICYCLICS UR QL SCN: NEGATIVE
TROPONIN T DELTA: 1 NG/L
TROPONIN T SERPL HS-MCNC: 43 NG/L
TROPONIN T SERPL HS-MCNC: 44 NG/L
TROPONIN T SERPL HS-MCNC: 44 NG/L
TROPONIN T SERPL HS-MCNC: 50 NG/L
TROPONIN T SERPL HS-MCNC: 56 NG/L
TSH SERPL DL<=0.05 MIU/L-ACNC: 2 UIU/ML (ref 0.27–4.2)
TSH SERPL DL<=0.05 MIU/L-ACNC: 2.23 UIU/ML (ref 0.27–4.2)
UNIT  ABO: NORMAL
UNIT  RH: NORMAL
UROBILINOGEN UR QL STRIP: ABNORMAL
UROBILINOGEN UR QL STRIP: ABNORMAL
VIT B12 BLD-MCNC: >2000 PG/ML (ref 211–946)
WBC # UR STRIP: ABNORMAL /HPF
WBC # UR STRIP: ABNORMAL /HPF
WBC MORPH BLD: NORMAL
WBC MORPH BLD: NORMAL
WBC NRBC COR # BLD: 10.69 10*3/MM3 (ref 3.4–10.8)
WBC NRBC COR # BLD: 10.97 10*3/MM3 (ref 3.4–10.8)
WBC NRBC COR # BLD: 12.03 10*3/MM3 (ref 3.4–10.8)
WBC NRBC COR # BLD: 3.96 10*3/MM3 (ref 3.4–10.8)
WBC NRBC COR # BLD: 4.66 10*3/MM3 (ref 3.4–10.8)
WBC NRBC COR # BLD: 4.8 10*3/MM3 (ref 3.4–10.8)
WBC NRBC COR # BLD: 5.29 10*3/MM3 (ref 3.4–10.8)
WBC NRBC COR # BLD: 5.46 10*3/MM3 (ref 3.4–10.8)
WBC NRBC COR # BLD: 5.72 10*3/MM3 (ref 3.4–10.8)
WBC NRBC COR # BLD: 5.94 10*3/MM3 (ref 3.4–10.8)
WBC NRBC COR # BLD: 7.13 10*3/MM3 (ref 3.4–10.8)
WBC NRBC COR # BLD: 7.59 10*3/MM3 (ref 3.4–10.8)
WBC NRBC COR # BLD: 8.57 10*3/MM3 (ref 3.4–10.8)
WBC NRBC COR # BLD: 8.79 10*3/MM3 (ref 3.4–10.8)
WHOLE BLOOD HOLD COAG: NORMAL
WHOLE BLOOD HOLD SPECIMEN: NORMAL

## 2023-01-01 PROCEDURE — 85025 COMPLETE CBC W/AUTO DIFF WBC: CPT | Performed by: INTERNAL MEDICINE

## 2023-01-01 PROCEDURE — 94640 AIRWAY INHALATION TREATMENT: CPT

## 2023-01-01 PROCEDURE — 84100 ASSAY OF PHOSPHORUS: CPT | Performed by: INTERNAL MEDICINE

## 2023-01-01 PROCEDURE — 94664 DEMO&/EVAL PT USE INHALER: CPT

## 2023-01-01 PROCEDURE — 86900 BLOOD TYPING SEROLOGIC ABO: CPT

## 2023-01-01 PROCEDURE — 85007 BL SMEAR W/DIFF WBC COUNT: CPT

## 2023-01-01 PROCEDURE — 80143 DRUG ASSAY ACETAMINOPHEN: CPT | Performed by: EMERGENCY MEDICINE

## 2023-01-01 PROCEDURE — 70450 CT HEAD/BRAIN W/O DYE: CPT

## 2023-01-01 PROCEDURE — 25010000002 HEPARIN (PORCINE) PER 1000 UNITS: Performed by: INTERNAL MEDICINE

## 2023-01-01 PROCEDURE — 93010 ELECTROCARDIOGRAM REPORT: CPT | Performed by: INTERNAL MEDICINE

## 2023-01-01 PROCEDURE — 87086 URINE CULTURE/COLONY COUNT: CPT | Performed by: INTERNAL MEDICINE

## 2023-01-01 PROCEDURE — 25010000002 PIPERACILLIN SOD-TAZOBACTAM PER 1 G: Performed by: INTERNAL MEDICINE

## 2023-01-01 PROCEDURE — 87040 BLOOD CULTURE FOR BACTERIA: CPT | Performed by: EMERGENCY MEDICINE

## 2023-01-01 PROCEDURE — 84132 ASSAY OF SERUM POTASSIUM: CPT | Performed by: INTERNAL MEDICINE

## 2023-01-01 PROCEDURE — 97530 THERAPEUTIC ACTIVITIES: CPT

## 2023-01-01 PROCEDURE — 97165 OT EVAL LOW COMPLEX 30 MIN: CPT

## 2023-01-01 PROCEDURE — 83880 ASSAY OF NATRIURETIC PEPTIDE: CPT | Performed by: EMERGENCY MEDICINE

## 2023-01-01 PROCEDURE — 86850 RBC ANTIBODY SCREEN: CPT | Performed by: INTERNAL MEDICINE

## 2023-01-01 PROCEDURE — 84484 ASSAY OF TROPONIN QUANT: CPT | Performed by: EMERGENCY MEDICINE

## 2023-01-01 PROCEDURE — 25010000002 HALOPERIDOL LACTATE PER 5 MG

## 2023-01-01 PROCEDURE — 71045 X-RAY EXAM CHEST 1 VIEW: CPT

## 2023-01-01 PROCEDURE — P9047 ALBUMIN (HUMAN), 25%, 50ML: HCPCS | Performed by: INTERNAL MEDICINE

## 2023-01-01 PROCEDURE — 83050 HGB METHEMOGLOBIN QUAN: CPT

## 2023-01-01 PROCEDURE — 83735 ASSAY OF MAGNESIUM: CPT | Performed by: NURSE PRACTITIONER

## 2023-01-01 PROCEDURE — 84100 ASSAY OF PHOSPHORUS: CPT | Performed by: NURSE PRACTITIONER

## 2023-01-01 PROCEDURE — 94660 CPAP INITIATION&MGMT: CPT

## 2023-01-01 PROCEDURE — 82375 ASSAY CARBOXYHB QUANT: CPT

## 2023-01-01 PROCEDURE — 86923 COMPATIBILITY TEST ELECTRIC: CPT

## 2023-01-01 PROCEDURE — 83605 ASSAY OF LACTIC ACID: CPT | Performed by: NURSE PRACTITIONER

## 2023-01-01 PROCEDURE — 94799 UNLISTED PULMONARY SVC/PX: CPT

## 2023-01-01 PROCEDURE — 25010000002 ENOXAPARIN PER 10 MG: Performed by: FAMILY MEDICINE

## 2023-01-01 PROCEDURE — 84132 ASSAY OF SERUM POTASSIUM: CPT | Performed by: NURSE PRACTITIONER

## 2023-01-01 PROCEDURE — 25010000002 MAGNESIUM SULFATE 2 GM/50ML SOLUTION: Performed by: NURSE PRACTITIONER

## 2023-01-01 PROCEDURE — 92610 EVALUATE SWALLOWING FUNCTION: CPT

## 2023-01-01 PROCEDURE — P9016 RBC LEUKOCYTES REDUCED: HCPCS

## 2023-01-01 PROCEDURE — 85027 COMPLETE CBC AUTOMATED: CPT | Performed by: INTERNAL MEDICINE

## 2023-01-01 PROCEDURE — 82962 GLUCOSE BLOOD TEST: CPT

## 2023-01-01 PROCEDURE — 25010000002 HALOPERIDOL LACTATE PER 5 MG: Performed by: INTERNAL MEDICINE

## 2023-01-01 PROCEDURE — 99232 SBSQ HOSP IP/OBS MODERATE 35: CPT | Performed by: INTERNAL MEDICINE

## 2023-01-01 PROCEDURE — 83735 ASSAY OF MAGNESIUM: CPT | Performed by: INTERNAL MEDICINE

## 2023-01-01 PROCEDURE — 25010000002 DOPAMINE PER 40 MG: Performed by: NURSE PRACTITIONER

## 2023-01-01 PROCEDURE — 86900 BLOOD TYPING SEROLOGIC ABO: CPT | Performed by: INTERNAL MEDICINE

## 2023-01-01 PROCEDURE — C1894 INTRO/SHEATH, NON-LASER: HCPCS

## 2023-01-01 PROCEDURE — 80048 BASIC METABOLIC PNL TOTAL CA: CPT | Performed by: NURSE PRACTITIONER

## 2023-01-01 PROCEDURE — 83880 ASSAY OF NATRIURETIC PEPTIDE: CPT | Performed by: NURSE PRACTITIONER

## 2023-01-01 PROCEDURE — 85027 COMPLETE CBC AUTOMATED: CPT | Performed by: NURSE PRACTITIONER

## 2023-01-01 PROCEDURE — 99223 1ST HOSP IP/OBS HIGH 75: CPT | Performed by: FAMILY MEDICINE

## 2023-01-01 PROCEDURE — 84466 ASSAY OF TRANSFERRIN: CPT | Performed by: INTERNAL MEDICINE

## 2023-01-01 PROCEDURE — 99291 CRITICAL CARE FIRST HOUR: CPT | Performed by: INTERNAL MEDICINE

## 2023-01-01 PROCEDURE — P9047 ALBUMIN (HUMAN), 25%, 50ML: HCPCS | Performed by: NURSE PRACTITIONER

## 2023-01-01 PROCEDURE — 25010000002 ALBUMIN HUMAN 25% PER 50 ML: Performed by: NURSE PRACTITIONER

## 2023-01-01 PROCEDURE — 82607 VITAMIN B-12: CPT | Performed by: INTERNAL MEDICINE

## 2023-01-01 PROCEDURE — 93306 TTE W/DOPPLER COMPLETE: CPT | Performed by: INTERNAL MEDICINE

## 2023-01-01 PROCEDURE — 82805 BLOOD GASES W/O2 SATURATION: CPT

## 2023-01-01 PROCEDURE — 84443 ASSAY THYROID STIM HORMONE: CPT | Performed by: EMERGENCY MEDICINE

## 2023-01-01 PROCEDURE — 25010000002 COSYNTROPIN PER 0.25 MG: Performed by: INTERNAL MEDICINE

## 2023-01-01 PROCEDURE — 83605 ASSAY OF LACTIC ACID: CPT | Performed by: EMERGENCY MEDICINE

## 2023-01-01 PROCEDURE — 81001 URINALYSIS AUTO W/SCOPE: CPT | Performed by: EMERGENCY MEDICINE

## 2023-01-01 PROCEDURE — 84145 PROCALCITONIN (PCT): CPT | Performed by: INTERNAL MEDICINE

## 2023-01-01 PROCEDURE — P9047 ALBUMIN (HUMAN), 25%, 50ML: HCPCS

## 2023-01-01 PROCEDURE — 83615 LACTATE (LD) (LDH) ENZYME: CPT | Performed by: INTERNAL MEDICINE

## 2023-01-01 PROCEDURE — 25010000002 PIPERACILLIN SOD-TAZOBACTAM PER 1 G: Performed by: EMERGENCY MEDICINE

## 2023-01-01 PROCEDURE — 25010000002 FUROSEMIDE PER 20 MG: Performed by: INTERNAL MEDICINE

## 2023-01-01 PROCEDURE — 0 MAGNESIUM SULFATE 4 GM/100ML SOLUTION: Performed by: NURSE PRACTITIONER

## 2023-01-01 PROCEDURE — 93970 EXTREMITY STUDY: CPT | Performed by: INTERNAL MEDICINE

## 2023-01-01 PROCEDURE — 25010000002 HALOPERIDOL LACTATE PER 5 MG: Performed by: NURSE PRACTITIONER

## 2023-01-01 PROCEDURE — 80048 BASIC METABOLIC PNL TOTAL CA: CPT | Performed by: INTERNAL MEDICINE

## 2023-01-01 PROCEDURE — 85025 COMPLETE CBC W/AUTO DIFF WBC: CPT

## 2023-01-01 PROCEDURE — 97116 GAIT TRAINING THERAPY: CPT

## 2023-01-01 PROCEDURE — 87641 MR-STAPH DNA AMP PROBE: CPT | Performed by: INTERNAL MEDICINE

## 2023-01-01 PROCEDURE — 80050 GENERAL HEALTH PANEL: CPT | Performed by: EMERGENCY MEDICINE

## 2023-01-01 PROCEDURE — 25010000002 METHYLPREDNISOLONE PER 40 MG: Performed by: FAMILY MEDICINE

## 2023-01-01 PROCEDURE — 02HV33Z INSERTION OF INFUSION DEVICE INTO SUPERIOR VENA CAVA, PERCUTANEOUS APPROACH: ICD-10-PCS | Performed by: NURSE PRACTITIONER

## 2023-01-01 PROCEDURE — 84145 PROCALCITONIN (PCT): CPT | Performed by: EMERGENCY MEDICINE

## 2023-01-01 PROCEDURE — 25010000002 KETOROLAC TROMETHAMINE PER 15 MG: Performed by: NURSE PRACTITIONER

## 2023-01-01 PROCEDURE — 84484 ASSAY OF TROPONIN QUANT: CPT | Performed by: NURSE PRACTITIONER

## 2023-01-01 PROCEDURE — 25010000002 ALBUMIN HUMAN 25% PER 50 ML

## 2023-01-01 PROCEDURE — 93005 ELECTROCARDIOGRAM TRACING: CPT | Performed by: INTERNAL MEDICINE

## 2023-01-01 PROCEDURE — 93306 TTE W/DOPPLER COMPLETE: CPT

## 2023-01-01 PROCEDURE — 36430 TRANSFUSION BLD/BLD COMPNT: CPT

## 2023-01-01 PROCEDURE — C1751 CATH, INF, PER/CENT/MIDLINE: HCPCS

## 2023-01-01 PROCEDURE — 80053 COMPREHEN METABOLIC PANEL: CPT | Performed by: INTERNAL MEDICINE

## 2023-01-01 PROCEDURE — 87086 URINE CULTURE/COLONY COUNT: CPT | Performed by: EMERGENCY MEDICINE

## 2023-01-01 PROCEDURE — 99285 EMERGENCY DEPT VISIT HI MDM: CPT

## 2023-01-01 PROCEDURE — 84484 ASSAY OF TROPONIN QUANT: CPT

## 2023-01-01 PROCEDURE — 85025 COMPLETE CBC W/AUTO DIFF WBC: CPT | Performed by: NURSE PRACTITIONER

## 2023-01-01 PROCEDURE — 99233 SBSQ HOSP IP/OBS HIGH 50: CPT | Performed by: INTERNAL MEDICINE

## 2023-01-01 PROCEDURE — 83540 ASSAY OF IRON: CPT | Performed by: INTERNAL MEDICINE

## 2023-01-01 PROCEDURE — 99238 HOSP IP/OBS DSCHRG MGMT 30/<: CPT | Performed by: INTERNAL MEDICINE

## 2023-01-01 PROCEDURE — 25010000002 MORPHINE PER 10 MG: Performed by: NURSE PRACTITIONER

## 2023-01-01 PROCEDURE — 99291 CRITICAL CARE FIRST HOUR: CPT

## 2023-01-01 PROCEDURE — 99239 HOSP IP/OBS DSCHRG MGMT >30: CPT | Performed by: INTERNAL MEDICINE

## 2023-01-01 PROCEDURE — 82746 ASSAY OF FOLIC ACID SERUM: CPT | Performed by: INTERNAL MEDICINE

## 2023-01-01 PROCEDURE — 25010000002 CEFEPIME PER 500 MG: Performed by: INTERNAL MEDICINE

## 2023-01-01 PROCEDURE — 80306 DRUG TEST PRSMV INSTRMNT: CPT | Performed by: EMERGENCY MEDICINE

## 2023-01-01 PROCEDURE — 25010000002 FUROSEMIDE PER 20 MG: Performed by: NURSE PRACTITIONER

## 2023-01-01 PROCEDURE — 0202U NFCT DS 22 TRGT SARS-COV-2: CPT | Performed by: EMERGENCY MEDICINE

## 2023-01-01 PROCEDURE — 82533 TOTAL CORTISOL: CPT | Performed by: INTERNAL MEDICINE

## 2023-01-01 PROCEDURE — 83735 ASSAY OF MAGNESIUM: CPT

## 2023-01-01 PROCEDURE — 93005 ELECTROCARDIOGRAM TRACING: CPT | Performed by: EMERGENCY MEDICINE

## 2023-01-01 PROCEDURE — 85007 BL SMEAR W/DIFF WBC COUNT: CPT | Performed by: EMERGENCY MEDICINE

## 2023-01-01 PROCEDURE — 82272 OCCULT BLD FECES 1-3 TESTS: CPT | Performed by: INTERNAL MEDICINE

## 2023-01-01 PROCEDURE — 84439 ASSAY OF FREE THYROXINE: CPT | Performed by: EMERGENCY MEDICINE

## 2023-01-01 PROCEDURE — 25010000002 MORPHINE PER 10 MG

## 2023-01-01 PROCEDURE — 93005 ELECTROCARDIOGRAM TRACING: CPT | Performed by: FAMILY MEDICINE

## 2023-01-01 PROCEDURE — 0 POTASSIUM CHLORIDE PER 2 MEQ: Performed by: INTERNAL MEDICINE

## 2023-01-01 PROCEDURE — 02HV33Z INSERTION OF INFUSION DEVICE INTO SUPERIOR VENA CAVA, PERCUTANEOUS APPROACH: ICD-10-PCS | Performed by: INTERNAL MEDICINE

## 2023-01-01 PROCEDURE — 86901 BLOOD TYPING SEROLOGIC RH(D): CPT | Performed by: INTERNAL MEDICINE

## 2023-01-01 PROCEDURE — 36415 COLL VENOUS BLD VENIPUNCTURE: CPT

## 2023-01-01 PROCEDURE — 85045 AUTOMATED RETICULOCYTE COUNT: CPT | Performed by: INTERNAL MEDICINE

## 2023-01-01 PROCEDURE — 0 MAGNESIUM SULFATE 4 GM/100ML SOLUTION: Performed by: INTERNAL MEDICINE

## 2023-01-01 PROCEDURE — 97110 THERAPEUTIC EXERCISES: CPT

## 2023-01-01 PROCEDURE — 93005 ELECTROCARDIOGRAM TRACING: CPT

## 2023-01-01 PROCEDURE — 99232 SBSQ HOSP IP/OBS MODERATE 35: CPT | Performed by: NURSE PRACTITIONER

## 2023-01-01 PROCEDURE — 87077 CULTURE AEROBIC IDENTIFY: CPT | Performed by: EMERGENCY MEDICINE

## 2023-01-01 PROCEDURE — 82550 ASSAY OF CK (CPK): CPT | Performed by: EMERGENCY MEDICINE

## 2023-01-01 PROCEDURE — 80053 COMPREHEN METABOLIC PANEL: CPT

## 2023-01-01 PROCEDURE — 25010000002 SULFUR HEXAFLUORIDE MICROSPH 60.7-25 MG RECONSTITUTED SUSPENSION: Performed by: INTERNAL MEDICINE

## 2023-01-01 PROCEDURE — 83735 ASSAY OF MAGNESIUM: CPT | Performed by: EMERGENCY MEDICINE

## 2023-01-01 PROCEDURE — 85610 PROTHROMBIN TIME: CPT | Performed by: EMERGENCY MEDICINE

## 2023-01-01 PROCEDURE — 25010000002 DIGOXIN PER 500 MCG: Performed by: INTERNAL MEDICINE

## 2023-01-01 PROCEDURE — 25010000002 ALBUMIN HUMAN 25% PER 50 ML: Performed by: INTERNAL MEDICINE

## 2023-01-01 PROCEDURE — 82140 ASSAY OF AMMONIA: CPT | Performed by: EMERGENCY MEDICINE

## 2023-01-01 PROCEDURE — 85652 RBC SED RATE AUTOMATED: CPT | Performed by: INTERNAL MEDICINE

## 2023-01-01 PROCEDURE — 80179 DRUG ASSAY SALICYLATE: CPT | Performed by: EMERGENCY MEDICINE

## 2023-01-01 PROCEDURE — 87186 SC STD MICRODIL/AGAR DIL: CPT | Performed by: EMERGENCY MEDICINE

## 2023-01-01 PROCEDURE — 83880 ASSAY OF NATRIURETIC PEPTIDE: CPT | Performed by: INTERNAL MEDICINE

## 2023-01-01 PROCEDURE — 93970 EXTREMITY STUDY: CPT

## 2023-01-01 PROCEDURE — 97162 PT EVAL MOD COMPLEX 30 MIN: CPT

## 2023-01-01 PROCEDURE — 25010000002 VANCOMYCIN 10 G RECONSTITUTED SOLUTION: Performed by: EMERGENCY MEDICINE

## 2023-01-01 PROCEDURE — 36600 WITHDRAWAL OF ARTERIAL BLOOD: CPT

## 2023-01-01 PROCEDURE — 82077 ASSAY SPEC XCP UR&BREATH IA: CPT | Performed by: EMERGENCY MEDICINE

## 2023-01-01 PROCEDURE — 87102 FUNGUS ISOLATION CULTURE: CPT | Performed by: NURSE PRACTITIONER

## 2023-01-01 PROCEDURE — 70551 MRI BRAIN STEM W/O DYE: CPT

## 2023-01-01 PROCEDURE — 25010000002 PIPERACILLIN SOD-TAZOBACTAM PER 1 G: Performed by: FAMILY MEDICINE

## 2023-01-01 PROCEDURE — 86901 BLOOD TYPING SEROLOGIC RH(D): CPT

## 2023-01-01 RX ORDER — LANOLIN ALCOHOL/MO/W.PET/CERES
6 CREAM (GRAM) TOPICAL NIGHTLY
COMMUNITY

## 2023-01-01 RX ORDER — DEXTROSE MONOHYDRATE 25 G/50ML
25 INJECTION, SOLUTION INTRAVENOUS
Status: DISCONTINUED | OUTPATIENT
Start: 2023-01-01 | End: 2023-01-01 | Stop reason: HOSPADM

## 2023-01-01 RX ORDER — ACETAMINOPHEN 325 MG/1
650 TABLET ORAL EVERY 6 HOURS PRN
Status: DISCONTINUED | OUTPATIENT
Start: 2023-01-01 | End: 2023-01-01 | Stop reason: HOSPADM

## 2023-01-01 RX ORDER — HEPARIN SODIUM 5000 [USP'U]/ML
5000 INJECTION, SOLUTION INTRAVENOUS; SUBCUTANEOUS EVERY 8 HOURS SCHEDULED
Status: DISCONTINUED | OUTPATIENT
Start: 2023-01-01 | End: 2023-01-01 | Stop reason: HOSPADM

## 2023-01-01 RX ORDER — PRENATAL VIT/IRON FUM/FOLIC AC 27MG-0.8MG
1 TABLET ORAL DAILY
Status: CANCELLED | OUTPATIENT
Start: 2023-01-01

## 2023-01-01 RX ORDER — ONDANSETRON 2 MG/ML
4 INJECTION INTRAMUSCULAR; INTRAVENOUS EVERY 6 HOURS PRN
Status: DISCONTINUED | OUTPATIENT
Start: 2023-01-01 | End: 2023-01-01 | Stop reason: HOSPADM

## 2023-01-01 RX ORDER — METHYLPREDNISOLONE SODIUM SUCCINATE 40 MG/ML
40 INJECTION, POWDER, LYOPHILIZED, FOR SOLUTION INTRAMUSCULAR; INTRAVENOUS EVERY 8 HOURS
Status: DISCONTINUED | OUTPATIENT
Start: 2023-01-01 | End: 2023-01-01

## 2023-01-01 RX ORDER — LEVOTHYROXINE SODIUM 0.05 MG/1
50 TABLET ORAL
Status: DISCONTINUED | OUTPATIENT
Start: 2023-01-01 | End: 2023-01-01 | Stop reason: HOSPADM

## 2023-01-01 RX ORDER — MORPHINE SULFATE 2 MG/ML
2 INJECTION, SOLUTION INTRAMUSCULAR; INTRAVENOUS
Status: DISCONTINUED | OUTPATIENT
Start: 2023-01-01 | End: 2023-04-23 | Stop reason: HOSPADM

## 2023-01-01 RX ORDER — DEXTROSE MONOHYDRATE 25 G/50ML
INJECTION, SOLUTION INTRAVENOUS
Status: COMPLETED
Start: 2023-01-01 | End: 2023-01-01

## 2023-01-01 RX ORDER — ONDANSETRON 2 MG/ML
4 INJECTION INTRAMUSCULAR; INTRAVENOUS EVERY 6 HOURS PRN
Status: CANCELLED | OUTPATIENT
Start: 2023-01-01

## 2023-01-01 RX ORDER — VANCOMYCIN 2 GRAM/500 ML IN 0.9 % SODIUM CHLORIDE INTRAVENOUS
20 ONCE
Status: COMPLETED | OUTPATIENT
Start: 2023-01-01 | End: 2023-01-01

## 2023-01-01 RX ORDER — MIDODRINE HYDROCHLORIDE 10 MG/1
10 TABLET ORAL
Status: CANCELLED | OUTPATIENT
Start: 2023-01-01

## 2023-01-01 RX ORDER — QUETIAPINE FUMARATE 25 MG/1
25 TABLET, FILM COATED ORAL 2 TIMES DAILY PRN
Status: CANCELLED | OUTPATIENT
Start: 2023-01-01

## 2023-01-01 RX ORDER — FOLIC ACID 1 MG/1
1 TABLET ORAL DAILY
Status: DISCONTINUED | OUTPATIENT
Start: 2023-01-01 | End: 2023-01-01 | Stop reason: HOSPADM

## 2023-01-01 RX ORDER — ONDANSETRON 2 MG/ML
4 INJECTION INTRAMUSCULAR; INTRAVENOUS EVERY 6 HOURS PRN
Status: DISCONTINUED | OUTPATIENT
Start: 2023-01-01 | End: 2023-04-23 | Stop reason: HOSPADM

## 2023-01-01 RX ORDER — FUROSEMIDE 10 MG/ML
80 INJECTION INTRAMUSCULAR; INTRAVENOUS ONCE
Status: COMPLETED | OUTPATIENT
Start: 2023-01-01 | End: 2023-01-01

## 2023-01-01 RX ORDER — MAGNESIUM SULFATE HEPTAHYDRATE 40 MG/ML
4 INJECTION, SOLUTION INTRAVENOUS ONCE
Status: COMPLETED | OUTPATIENT
Start: 2023-01-01 | End: 2023-01-01

## 2023-01-01 RX ORDER — CHOLECALCIFEROL (VITAMIN D3) 125 MCG
5 CAPSULE ORAL NIGHTLY
Status: DISCONTINUED | OUTPATIENT
Start: 2023-01-01 | End: 2023-01-01

## 2023-01-01 RX ORDER — ALBUMIN (HUMAN) 12.5 G/50ML
SOLUTION INTRAVENOUS
Status: DISPENSED
Start: 2023-01-01 | End: 2023-01-01

## 2023-01-01 RX ORDER — HEPARIN SODIUM 1000 [USP'U]/ML
5000 INJECTION, SOLUTION INTRAVENOUS; SUBCUTANEOUS EVERY 12 HOURS
COMMUNITY

## 2023-01-01 RX ORDER — TRAMADOL HYDROCHLORIDE 50 MG/1
50 TABLET ORAL EVERY 8 HOURS PRN
Status: DISCONTINUED | OUTPATIENT
Start: 2023-01-01 | End: 2023-01-01 | Stop reason: HOSPADM

## 2023-01-01 RX ORDER — DIGOXIN 125 MCG
125 TABLET ORAL
Status: DISCONTINUED | OUTPATIENT
Start: 2023-01-01 | End: 2023-01-01

## 2023-01-01 RX ORDER — IBUPROFEN 600 MG/1
1 TABLET ORAL
Status: DISCONTINUED | OUTPATIENT
Start: 2023-01-01 | End: 2023-01-01 | Stop reason: HOSPADM

## 2023-01-01 RX ORDER — ACETAMINOPHEN 160 MG/5ML
650 SOLUTION ORAL EVERY 4 HOURS PRN
Status: DISCONTINUED | OUTPATIENT
Start: 2023-01-01 | End: 2023-04-23 | Stop reason: HOSPADM

## 2023-01-01 RX ORDER — PANTOPRAZOLE SODIUM 40 MG/1
40 TABLET, DELAYED RELEASE ORAL NIGHTLY
Status: DISCONTINUED | OUTPATIENT
Start: 2023-01-01 | End: 2023-01-01 | Stop reason: HOSPADM

## 2023-01-01 RX ORDER — CHOLESTYRAMINE LIGHT 4 G/5.7G
1 POWDER, FOR SUSPENSION ORAL 2 TIMES DAILY WITH MEALS
Status: DISCONTINUED | OUTPATIENT
Start: 2023-01-01 | End: 2023-01-01 | Stop reason: HOSPADM

## 2023-01-01 RX ORDER — BUSPIRONE HYDROCHLORIDE 5 MG/1
5 TABLET ORAL 3 TIMES DAILY
Status: CANCELLED | OUTPATIENT
Start: 2023-01-01

## 2023-01-01 RX ORDER — SODIUM CHLORIDE 0.9 % (FLUSH) 0.9 %
10 SYRINGE (ML) INJECTION AS NEEDED
Status: DISCONTINUED | OUTPATIENT
Start: 2023-01-01 | End: 2023-01-01

## 2023-01-01 RX ORDER — MIDODRINE HYDROCHLORIDE 10 MG/1
10 TABLET ORAL
Start: 2023-01-01

## 2023-01-01 RX ORDER — SODIUM CHLORIDE 9 MG/ML
50 INJECTION, SOLUTION INTRAVENOUS CONTINUOUS
Status: DISCONTINUED | OUTPATIENT
Start: 2023-01-01 | End: 2023-01-01

## 2023-01-01 RX ORDER — COSYNTROPIN 0.25 MG/ML
0.25 INJECTION, POWDER, FOR SOLUTION INTRAMUSCULAR; INTRAVENOUS ONCE
Status: COMPLETED | OUTPATIENT
Start: 2023-01-01 | End: 2023-01-01

## 2023-01-01 RX ORDER — SODIUM CHLORIDE 0.9 % (FLUSH) 0.9 %
10 SYRINGE (ML) INJECTION AS NEEDED
Status: DISCONTINUED | OUTPATIENT
Start: 2023-01-01 | End: 2023-04-23 | Stop reason: HOSPADM

## 2023-01-01 RX ORDER — POTASSIUM CHLORIDE 1.5 G/1.77G
40 POWDER, FOR SOLUTION ORAL EVERY 4 HOURS
Status: COMPLETED | OUTPATIENT
Start: 2023-01-01 | End: 2023-01-01

## 2023-01-01 RX ORDER — BISACODYL 10 MG
10 SUPPOSITORY, RECTAL RECTAL DAILY PRN
Status: DISCONTINUED | OUTPATIENT
Start: 2023-01-01 | End: 2023-04-23 | Stop reason: HOSPADM

## 2023-01-01 RX ORDER — POLYETHYLENE GLYCOL 3350 17 G/17G
17 POWDER, FOR SOLUTION ORAL DAILY PRN
Status: DISCONTINUED | OUTPATIENT
Start: 2023-01-01 | End: 2023-01-01 | Stop reason: HOSPADM

## 2023-01-01 RX ORDER — TRAMADOL HYDROCHLORIDE 50 MG/1
50 TABLET ORAL EVERY 8 HOURS PRN
Start: 2023-01-01 | End: 2023-01-01

## 2023-01-01 RX ORDER — BISACODYL 10 MG
10 SUPPOSITORY, RECTAL RECTAL DAILY PRN
COMMUNITY

## 2023-01-01 RX ORDER — SODIUM CHLORIDE, SODIUM LACTATE, POTASSIUM CHLORIDE, CALCIUM CHLORIDE 600; 310; 30; 20 MG/100ML; MG/100ML; MG/100ML; MG/100ML
125 INJECTION, SOLUTION INTRAVENOUS CONTINUOUS
Status: DISCONTINUED | OUTPATIENT
Start: 2023-01-01 | End: 2023-01-01

## 2023-01-01 RX ORDER — BISACODYL 5 MG/1
10 TABLET, DELAYED RELEASE ORAL DAILY PRN
COMMUNITY

## 2023-01-01 RX ORDER — DIGOXIN 0.25 MG/ML
250 INJECTION INTRAMUSCULAR; INTRAVENOUS ONCE
Status: COMPLETED | OUTPATIENT
Start: 2023-01-01 | End: 2023-01-01

## 2023-01-01 RX ORDER — MAGNESIUM SULFATE HEPTAHYDRATE 40 MG/ML
2 INJECTION, SOLUTION INTRAVENOUS ONCE
Status: COMPLETED | OUTPATIENT
Start: 2023-01-01 | End: 2023-01-01

## 2023-01-01 RX ORDER — MORPHINE SULFATE 2 MG/ML
2 INJECTION, SOLUTION INTRAMUSCULAR; INTRAVENOUS
Status: DISCONTINUED | OUTPATIENT
Start: 2023-01-01 | End: 2023-01-01 | Stop reason: HOSPADM

## 2023-01-01 RX ORDER — PHENOL 1.4 %
600 AEROSOL, SPRAY (ML) MUCOUS MEMBRANE DAILY
COMMUNITY
End: 2023-01-01 | Stop reason: HOSPADM

## 2023-01-01 RX ORDER — LORAZEPAM 2 MG/ML
1 INJECTION INTRAMUSCULAR EVERY 4 HOURS PRN
Status: DISCONTINUED | OUTPATIENT
Start: 2023-01-01 | End: 2023-01-01 | Stop reason: HOSPADM

## 2023-01-01 RX ORDER — IPRATROPIUM BROMIDE AND ALBUTEROL SULFATE 2.5; .5 MG/3ML; MG/3ML
3 SOLUTION RESPIRATORY (INHALATION) EVERY 4 HOURS PRN
Status: DISCONTINUED | OUTPATIENT
Start: 2023-01-01 | End: 2023-01-01 | Stop reason: HOSPADM

## 2023-01-01 RX ORDER — HALOPERIDOL 5 MG/ML
2 INJECTION INTRAMUSCULAR EVERY 6 HOURS PRN
Status: DISCONTINUED | OUTPATIENT
Start: 2023-01-01 | End: 2023-01-01 | Stop reason: HOSPADM

## 2023-01-01 RX ORDER — HALOPERIDOL 5 MG/ML
2 INJECTION INTRAMUSCULAR EVERY 6 HOURS PRN
Status: CANCELLED | OUTPATIENT
Start: 2023-01-01

## 2023-01-01 RX ORDER — SCOLOPAMINE TRANSDERMAL SYSTEM 1 MG/1
1 PATCH, EXTENDED RELEASE TRANSDERMAL
Status: DISCONTINUED | OUTPATIENT
Start: 2023-01-01 | End: 2023-04-23 | Stop reason: HOSPADM

## 2023-01-01 RX ORDER — FUROSEMIDE 10 MG/ML
40 INJECTION INTRAMUSCULAR; INTRAVENOUS ONCE
Status: COMPLETED | OUTPATIENT
Start: 2023-01-01 | End: 2023-01-01

## 2023-01-01 RX ORDER — LIDOCAINE HYDROCHLORIDE 20 MG/ML
JELLY TOPICAL AS NEEDED
Status: DISCONTINUED | OUTPATIENT
Start: 2023-01-01 | End: 2023-01-01 | Stop reason: HOSPADM

## 2023-01-01 RX ORDER — FUROSEMIDE 10 MG/ML
20 INJECTION INTRAMUSCULAR; INTRAVENOUS EVERY 6 HOURS PRN
Status: DISCONTINUED | OUTPATIENT
Start: 2023-01-01 | End: 2023-04-23 | Stop reason: HOSPADM

## 2023-01-01 RX ORDER — ACETAMINOPHEN 325 MG/1
650 TABLET ORAL EVERY 4 HOURS PRN
Status: DISCONTINUED | OUTPATIENT
Start: 2023-01-01 | End: 2023-04-23 | Stop reason: HOSPADM

## 2023-01-01 RX ORDER — LEVOTHYROXINE SODIUM 0.05 MG/1
50 TABLET ORAL
Status: CANCELLED | OUTPATIENT
Start: 2023-01-01

## 2023-01-01 RX ORDER — PRENATAL VIT/IRON FUM/FOLIC AC 27MG-0.8MG
1 TABLET ORAL DAILY
Status: DISCONTINUED | OUTPATIENT
Start: 2023-01-01 | End: 2023-01-01 | Stop reason: HOSPADM

## 2023-01-01 RX ORDER — MIDODRINE HYDROCHLORIDE 5 MG/1
5 TABLET ORAL
Status: DISCONTINUED | OUTPATIENT
Start: 2023-01-01 | End: 2023-01-01

## 2023-01-01 RX ORDER — CHOLESTYRAMINE 4 G/9G
1 POWDER, FOR SUSPENSION ORAL 2 TIMES DAILY WITH MEALS
COMMUNITY

## 2023-01-01 RX ORDER — PANTOPRAZOLE SODIUM 40 MG/1
40 TABLET, DELAYED RELEASE ORAL DAILY
Start: 2023-01-01

## 2023-01-01 RX ORDER — MORPHINE SULFATE 2 MG/ML
2 INJECTION, SOLUTION INTRAMUSCULAR; INTRAVENOUS EVERY 6 HOURS SCHEDULED
Status: DISCONTINUED | OUTPATIENT
Start: 2023-01-01 | End: 2023-04-23 | Stop reason: HOSPADM

## 2023-01-01 RX ORDER — CITALOPRAM 10 MG/1
10 TABLET ORAL DAILY
Status: CANCELLED | OUTPATIENT
Start: 2023-01-01

## 2023-01-01 RX ORDER — ONDANSETRON 4 MG/1
4 TABLET, ORALLY DISINTEGRATING ORAL EVERY 6 HOURS PRN
COMMUNITY

## 2023-01-01 RX ORDER — MEGESTROL ACETATE 40 MG/ML
800 SUSPENSION ORAL DAILY
Status: DISCONTINUED | OUTPATIENT
Start: 2023-01-01 | End: 2023-01-01 | Stop reason: HOSPADM

## 2023-01-01 RX ORDER — BUSPIRONE HYDROCHLORIDE 10 MG/1
5 TABLET ORAL 3 TIMES DAILY
Status: DISCONTINUED | OUTPATIENT
Start: 2023-01-01 | End: 2023-01-01

## 2023-01-01 RX ORDER — ONDANSETRON 4 MG/1
4 TABLET, FILM COATED ORAL EVERY 6 HOURS PRN
Status: CANCELLED | OUTPATIENT
Start: 2023-01-01

## 2023-01-01 RX ORDER — PANTOPRAZOLE SODIUM 40 MG/1
40 TABLET, DELAYED RELEASE ORAL
Status: DISCONTINUED | OUTPATIENT
Start: 2023-01-01 | End: 2023-01-01 | Stop reason: HOSPADM

## 2023-01-01 RX ORDER — FOLIC ACID 1 MG/1
0.5 TABLET ORAL DAILY
COMMUNITY

## 2023-01-01 RX ORDER — IPRATROPIUM BROMIDE AND ALBUTEROL SULFATE 2.5; .5 MG/3ML; MG/3ML
3 SOLUTION RESPIRATORY (INHALATION) EVERY 4 HOURS PRN
Status: CANCELLED | OUTPATIENT
Start: 2023-01-01

## 2023-01-01 RX ORDER — SODIUM CHLORIDE 0.9 % (FLUSH) 0.9 %
10 SYRINGE (ML) INJECTION EVERY 12 HOURS SCHEDULED
Status: DISCONTINUED | OUTPATIENT
Start: 2023-01-01 | End: 2023-01-01 | Stop reason: HOSPADM

## 2023-01-01 RX ORDER — UREA 10 %
3 LOTION (ML) TOPICAL NIGHTLY
COMMUNITY
End: 2023-01-01 | Stop reason: HOSPADM

## 2023-01-01 RX ORDER — POLYETHYLENE GLYCOL 3350 17 G/17G
17 POWDER, FOR SOLUTION ORAL DAILY PRN
Status: CANCELLED | OUTPATIENT
Start: 2023-01-01

## 2023-01-01 RX ORDER — POLYETHYLENE GLYCOL 3350 17 G/17G
17 POWDER, FOR SOLUTION ORAL DAILY PRN
COMMUNITY

## 2023-01-01 RX ORDER — BUSPIRONE HYDROCHLORIDE 5 MG/1
5 TABLET ORAL 3 TIMES DAILY
Status: DISCONTINUED | OUTPATIENT
Start: 2023-01-01 | End: 2023-01-01 | Stop reason: HOSPADM

## 2023-01-01 RX ORDER — MIDODRINE HYDROCHLORIDE 10 MG/1
10 TABLET ORAL
Status: DISCONTINUED | OUTPATIENT
Start: 2023-01-01 | End: 2023-01-01 | Stop reason: HOSPADM

## 2023-01-01 RX ORDER — CITALOPRAM 20 MG/1
10 TABLET ORAL DAILY
Status: DISCONTINUED | OUTPATIENT
Start: 2023-01-01 | End: 2023-01-01 | Stop reason: HOSPADM

## 2023-01-01 RX ORDER — TRAMADOL HYDROCHLORIDE 50 MG/1
50 TABLET ORAL EVERY 8 HOURS PRN
COMMUNITY

## 2023-01-01 RX ORDER — SODIUM CHLORIDE 0.9 % (FLUSH) 0.9 %
10 SYRINGE (ML) INJECTION EVERY 12 HOURS SCHEDULED
Status: CANCELLED | OUTPATIENT
Start: 2023-01-01

## 2023-01-01 RX ORDER — QUETIAPINE FUMARATE 25 MG/1
12.5 TABLET, FILM COATED ORAL NIGHTLY
Status: DISCONTINUED | OUTPATIENT
Start: 2023-01-01 | End: 2023-01-01

## 2023-01-01 RX ORDER — POTASSIUM CHLORIDE 29.8 MG/ML
20 INJECTION INTRAVENOUS
Status: DISPENSED | OUTPATIENT
Start: 2023-01-01 | End: 2023-01-01

## 2023-01-01 RX ORDER — MEGESTROL ACETATE 40 MG/ML
800 SUSPENSION ORAL DAILY
Start: 2023-01-01

## 2023-01-01 RX ORDER — ALBUMIN (HUMAN) 12.5 G/50ML
50 SOLUTION INTRAVENOUS ONCE
Status: COMPLETED | OUTPATIENT
Start: 2023-01-01 | End: 2023-01-01

## 2023-01-01 RX ORDER — TRAMADOL HYDROCHLORIDE 50 MG/1
50 TABLET ORAL EVERY 8 HOURS PRN
Status: CANCELLED | OUTPATIENT
Start: 2023-01-01

## 2023-01-01 RX ORDER — MORPHINE SULFATE 20 MG/ML
5 SOLUTION ORAL
Status: CANCELLED | OUTPATIENT
Start: 2023-01-01 | End: 2023-04-27

## 2023-01-01 RX ORDER — ALBUMIN (HUMAN) 12.5 G/50ML
12.5 SOLUTION INTRAVENOUS ONCE
Status: COMPLETED | OUTPATIENT
Start: 2023-01-01 | End: 2023-01-01

## 2023-01-01 RX ORDER — CALCIUM CARBONATE 200(500)MG
2 TABLET,CHEWABLE ORAL 2 TIMES DAILY PRN
Status: DISCONTINUED | OUTPATIENT
Start: 2023-01-01 | End: 2023-01-01 | Stop reason: HOSPADM

## 2023-01-01 RX ORDER — SODIUM CHLORIDE 0.9 % (FLUSH) 0.9 %
10 SYRINGE (ML) INJECTION AS NEEDED
Status: DISCONTINUED | OUTPATIENT
Start: 2023-01-01 | End: 2023-01-01 | Stop reason: HOSPADM

## 2023-01-01 RX ORDER — BUSPIRONE HYDROCHLORIDE 10 MG/1
5 TABLET ORAL 3 TIMES DAILY
Status: DISCONTINUED | OUTPATIENT
Start: 2023-01-01 | End: 2023-01-01 | Stop reason: HOSPADM

## 2023-01-01 RX ORDER — LIDOCAINE HYDROCHLORIDE 20 MG/ML
JELLY TOPICAL AS NEEDED
Status: CANCELLED | OUTPATIENT
Start: 2023-01-01

## 2023-01-01 RX ORDER — CITALOPRAM 10 MG/1
10 TABLET ORAL DAILY
Status: DISCONTINUED | OUTPATIENT
Start: 2023-01-01 | End: 2023-01-01 | Stop reason: HOSPADM

## 2023-01-01 RX ORDER — LORAZEPAM 2 MG/ML
1 INJECTION INTRAMUSCULAR EVERY 4 HOURS PRN
Status: DISCONTINUED | OUTPATIENT
Start: 2023-01-01 | End: 2023-04-23 | Stop reason: HOSPADM

## 2023-01-01 RX ORDER — CALCIUM CARBONATE 200(500)MG
2 TABLET,CHEWABLE ORAL 2 TIMES DAILY PRN
Status: CANCELLED | OUTPATIENT
Start: 2023-01-01

## 2023-01-01 RX ORDER — ALBUMIN (HUMAN) 12.5 G/50ML
25 SOLUTION INTRAVENOUS ONCE
Status: COMPLETED | OUTPATIENT
Start: 2023-01-01 | End: 2023-01-01

## 2023-01-01 RX ORDER — DEXTROSE MONOHYDRATE 100 MG/ML
50 INJECTION, SOLUTION INTRAVENOUS CONTINUOUS
Status: DISCONTINUED | OUTPATIENT
Start: 2023-01-01 | End: 2023-01-01

## 2023-01-01 RX ORDER — MORPHINE SULFATE 20 MG/ML
5 SOLUTION ORAL
Status: DISCONTINUED | OUTPATIENT
Start: 2023-01-01 | End: 2023-01-01 | Stop reason: HOSPADM

## 2023-01-01 RX ORDER — POTASSIUM CHLORIDE 29.8 MG/ML
20 INJECTION INTRAVENOUS ONCE
Status: COMPLETED | OUTPATIENT
Start: 2023-01-01 | End: 2023-01-01

## 2023-01-01 RX ORDER — POLYVINYL ALCOHOL 14 MG/ML
1 SOLUTION/ DROPS OPHTHALMIC
Status: DISCONTINUED | OUTPATIENT
Start: 2023-01-01 | End: 2023-04-23 | Stop reason: HOSPADM

## 2023-01-01 RX ORDER — KETOROLAC TROMETHAMINE 15 MG/ML
15 INJECTION, SOLUTION INTRAMUSCULAR; INTRAVENOUS EVERY 6 HOURS PRN
Status: DISCONTINUED | OUTPATIENT
Start: 2023-01-01 | End: 2023-04-23 | Stop reason: HOSPADM

## 2023-01-01 RX ORDER — LEVOTHYROXINE SODIUM 0.05 MG/1
50 TABLET ORAL DAILY
Status: DISCONTINUED | OUTPATIENT
Start: 2023-01-01 | End: 2023-01-01 | Stop reason: HOSPADM

## 2023-01-01 RX ORDER — DOPAMINE HYDROCHLORIDE 160 MG/100ML
5 INJECTION, SOLUTION INTRAVENOUS
Status: DISCONTINUED | OUTPATIENT
Start: 2023-01-01 | End: 2023-01-01

## 2023-01-01 RX ORDER — CHOLESTYRAMINE LIGHT 4 G/5.7G
1 POWDER, FOR SUSPENSION ORAL 2 TIMES DAILY WITH MEALS
Qty: 10 PACKET | Refills: 0
Start: 2023-01-01 | End: 2023-01-01

## 2023-01-01 RX ORDER — ACETAMINOPHEN 650 MG/1
650 SUPPOSITORY RECTAL EVERY 4 HOURS PRN
Status: DISCONTINUED | OUTPATIENT
Start: 2023-01-01 | End: 2023-04-23 | Stop reason: HOSPADM

## 2023-01-01 RX ORDER — ACETAMINOPHEN 500 MG
1000 TABLET ORAL 3 TIMES DAILY
Status: DISPENSED | OUTPATIENT
Start: 2023-01-01 | End: 2023-01-01

## 2023-01-01 RX ORDER — ENOXAPARIN SODIUM 100 MG/ML
40 INJECTION SUBCUTANEOUS EVERY EVENING
Status: DISCONTINUED | OUTPATIENT
Start: 2023-01-01 | End: 2023-01-01 | Stop reason: HOSPADM

## 2023-01-01 RX ORDER — MEGESTROL ACETATE 40 MG/ML
800 SUSPENSION ORAL DAILY
Status: CANCELLED | OUTPATIENT
Start: 2023-01-01

## 2023-01-01 RX ORDER — HALOPERIDOL 5 MG/ML
2 INJECTION INTRAMUSCULAR EVERY 6 HOURS PRN
Status: DISCONTINUED | OUTPATIENT
Start: 2023-01-01 | End: 2023-04-23 | Stop reason: HOSPADM

## 2023-01-01 RX ORDER — ENOXAPARIN SODIUM 100 MG/ML
40 INJECTION SUBCUTANEOUS EVERY EVENING
Status: CANCELLED | OUTPATIENT
Start: 2023-01-01

## 2023-01-01 RX ORDER — ACETAMINOPHEN 325 MG/1
650 TABLET ORAL EVERY 4 HOURS PRN
COMMUNITY

## 2023-01-01 RX ORDER — LANOLIN ALCOHOL/MO/W.PET/CERES
400 CREAM (GRAM) TOPICAL DAILY
COMMUNITY
End: 2023-01-01

## 2023-01-01 RX ORDER — LIDOCAINE HYDROCHLORIDE 20 MG/ML
JELLY TOPICAL ONCE
Status: COMPLETED | OUTPATIENT
Start: 2023-01-01 | End: 2023-01-01

## 2023-01-01 RX ORDER — MORPHINE SULFATE 2 MG/ML
2 INJECTION, SOLUTION INTRAMUSCULAR; INTRAVENOUS
Status: CANCELLED | OUTPATIENT
Start: 2023-01-01 | End: 2023-04-27

## 2023-01-01 RX ORDER — SODIUM CHLORIDE 9 MG/ML
40 INJECTION, SOLUTION INTRAVENOUS AS NEEDED
Status: DISCONTINUED | OUTPATIENT
Start: 2023-01-01 | End: 2023-01-01 | Stop reason: HOSPADM

## 2023-01-01 RX ORDER — DEXTROSE MONOHYDRATE 25 G/50ML
25 INJECTION, SOLUTION INTRAVENOUS ONCE
Status: COMPLETED | OUTPATIENT
Start: 2023-01-01 | End: 2023-01-01

## 2023-01-01 RX ORDER — SODIUM CHLORIDE 0.9 % (FLUSH) 0.9 %
10 SYRINGE (ML) INJECTION EVERY 12 HOURS SCHEDULED
Status: DISCONTINUED | OUTPATIENT
Start: 2023-01-01 | End: 2023-01-01

## 2023-01-01 RX ORDER — PANTOPRAZOLE SODIUM 40 MG/1
40 TABLET, DELAYED RELEASE ORAL
Status: CANCELLED | OUTPATIENT
Start: 2023-01-01

## 2023-01-01 RX ORDER — ONDANSETRON 4 MG/1
4 TABLET, FILM COATED ORAL EVERY 6 HOURS PRN
Status: DISCONTINUED | OUTPATIENT
Start: 2023-01-01 | End: 2023-01-01 | Stop reason: HOSPADM

## 2023-01-01 RX ORDER — LORAZEPAM 2 MG/ML
1 INJECTION INTRAMUSCULAR EVERY 4 HOURS PRN
Status: CANCELLED | OUTPATIENT
Start: 2023-01-01 | End: 2023-04-24

## 2023-01-01 RX ORDER — QUETIAPINE FUMARATE 25 MG/1
25 TABLET, FILM COATED ORAL 2 TIMES DAILY PRN
Status: DISCONTINUED | OUTPATIENT
Start: 2023-01-01 | End: 2023-01-01 | Stop reason: HOSPADM

## 2023-01-01 RX ORDER — LOPERAMIDE HYDROCHLORIDE 2 MG/1
2 CAPSULE ORAL 4 TIMES DAILY PRN
Status: DISCONTINUED | OUTPATIENT
Start: 2023-01-01 | End: 2023-01-01 | Stop reason: HOSPADM

## 2023-01-01 RX ORDER — SODIUM CHLORIDE 0.9 % (FLUSH) 0.9 %
10 SYRINGE (ML) INJECTION AS NEEDED
Status: CANCELLED | OUTPATIENT
Start: 2023-01-01

## 2023-01-01 RX ORDER — DEXTROSE MONOHYDRATE 25 G/50ML
50 INJECTION, SOLUTION INTRAVENOUS
Status: CANCELLED | OUTPATIENT
Start: 2023-01-01

## 2023-01-01 RX ORDER — SODIUM CHLORIDE 0.9 % (FLUSH) 0.9 %
20 SYRINGE (ML) INJECTION AS NEEDED
Status: DISCONTINUED | OUTPATIENT
Start: 2023-01-01 | End: 2023-01-01

## 2023-01-01 RX ORDER — NOREPINEPHRINE BITARTRATE 0.03 MG/ML
.02-.3 INJECTION, SOLUTION INTRAVENOUS
Status: DISCONTINUED | OUTPATIENT
Start: 2023-01-01 | End: 2023-01-01

## 2023-01-01 RX ORDER — CHOLESTYRAMINE LIGHT 4 G/5.7G
1 POWDER, FOR SUSPENSION ORAL 2 TIMES DAILY WITH MEALS
Status: DISCONTINUED | OUTPATIENT
Start: 2023-01-01 | End: 2023-01-01

## 2023-01-01 RX ORDER — DEXTROSE MONOHYDRATE 25 G/50ML
50 INJECTION, SOLUTION INTRAVENOUS
Status: DISCONTINUED | OUTPATIENT
Start: 2023-01-01 | End: 2023-01-01 | Stop reason: HOSPADM

## 2023-01-01 RX ORDER — LOPERAMIDE HCL 1 MG/7.5ML
2 SOLUTION ORAL 4 TIMES DAILY PRN
Status: DISCONTINUED | OUTPATIENT
Start: 2023-01-01 | End: 2023-01-01

## 2023-01-01 RX ORDER — SODIUM CHLORIDE 9 MG/ML
40 INJECTION, SOLUTION INTRAVENOUS AS NEEDED
Status: CANCELLED | OUTPATIENT
Start: 2023-01-01

## 2023-01-01 RX ORDER — NICOTINE POLACRILEX 4 MG
15 LOZENGE BUCCAL
Status: DISCONTINUED | OUTPATIENT
Start: 2023-01-01 | End: 2023-01-01 | Stop reason: HOSPADM

## 2023-01-01 RX ORDER — HALOPERIDOL 5 MG/ML
2 INJECTION INTRAMUSCULAR ONCE
Status: DISCONTINUED | OUTPATIENT
Start: 2023-01-01 | End: 2023-01-01

## 2023-01-01 RX ORDER — ALBUTEROL SULFATE 90 UG/1
2 AEROSOL, METERED RESPIRATORY (INHALATION) EVERY 4 HOURS PRN
COMMUNITY
End: 2023-01-01 | Stop reason: HOSPADM

## 2023-01-01 RX ORDER — HALOPERIDOL 5 MG/ML
2 INJECTION INTRAMUSCULAR ONCE
Status: COMPLETED | OUTPATIENT
Start: 2023-01-01 | End: 2023-01-01

## 2023-01-01 RX ORDER — PRENATAL VIT/IRON FUM/FOLIC AC 27MG-0.8MG
1 TABLET ORAL DAILY
Status: DISCONTINUED | OUTPATIENT
Start: 2023-01-01 | End: 2023-01-01

## 2023-01-01 RX ORDER — NOREPINEPHRINE BIT/0.9 % NACL 8 MG/250ML
.02-.3 INFUSION BOTTLE (ML) INTRAVENOUS
Status: DISCONTINUED | OUTPATIENT
Start: 2023-01-01 | End: 2023-01-01

## 2023-01-01 RX ORDER — DIGOXIN 250 MCG
250 TABLET ORAL EVERY 6 HOURS
Status: COMPLETED | OUTPATIENT
Start: 2023-01-01 | End: 2023-01-01

## 2023-01-01 RX ORDER — BUMETANIDE 0.25 MG/ML
2 INJECTION INTRAMUSCULAR; INTRAVENOUS ONCE
Status: COMPLETED | OUTPATIENT
Start: 2023-01-01 | End: 2023-01-01

## 2023-01-01 RX ADMIN — LEVOTHYROXINE SODIUM 50 MCG: 50 TABLET ORAL at 06:15

## 2023-01-01 RX ADMIN — FUROSEMIDE 20 MG: 10 INJECTION, SOLUTION INTRAVENOUS at 17:16

## 2023-01-01 RX ADMIN — TAZOBACTAM SODIUM AND PIPERACILLIN SODIUM 3.38 G: 375; 3 INJECTION, SOLUTION INTRAVENOUS at 13:11

## 2023-01-01 RX ADMIN — LEVOTHYROXINE SODIUM 50 MCG: 50 TABLET ORAL at 06:25

## 2023-01-01 RX ADMIN — MIDODRINE HYDROCHLORIDE 10 MG: 10 TABLET ORAL at 17:43

## 2023-01-01 RX ADMIN — LEVOTHYROXINE SODIUM 50 MCG: 50 TABLET ORAL at 05:44

## 2023-01-01 RX ADMIN — Medication 10 ML: at 00:09

## 2023-01-01 RX ADMIN — MAGNESIUM SULFATE HEPTAHYDRATE 4 G: 40 INJECTION, SOLUTION INTRAVENOUS at 06:45

## 2023-01-01 RX ADMIN — DIGOXIN 250 MCG: 250 TABLET ORAL at 17:55

## 2023-01-01 RX ADMIN — HEPARIN SODIUM 5000 UNITS: 5000 INJECTION INTRAVENOUS; SUBCUTANEOUS at 06:25

## 2023-01-01 RX ADMIN — Medication 10 ML: at 13:43

## 2023-01-01 RX ADMIN — MIDODRINE HYDROCHLORIDE 10 MG: 10 TABLET ORAL at 08:50

## 2023-01-01 RX ADMIN — CHOLESTYRAMINE 4 G: 4 POWDER, FOR SUSPENSION ORAL at 17:07

## 2023-01-01 RX ADMIN — Medication 10 ML: at 20:19

## 2023-01-01 RX ADMIN — POTASSIUM CHLORIDE 20 MEQ: 29.8 INJECTION, SOLUTION INTRAVENOUS at 06:19

## 2023-01-01 RX ADMIN — TAZOBACTAM SODIUM AND PIPERACILLIN SODIUM 3.38 G: 375; 3 INJECTION, SOLUTION INTRAVENOUS at 23:48

## 2023-01-01 RX ADMIN — TAZOBACTAM SODIUM AND PIPERACILLIN SODIUM 4.5 G: 500; 4 INJECTION, SOLUTION INTRAVENOUS at 11:15

## 2023-01-01 RX ADMIN — MIDODRINE HYDROCHLORIDE 5 MG: 5 TABLET ORAL at 11:59

## 2023-01-01 RX ADMIN — DEXTROSE MONOHYDRATE 50 ML: 25 INJECTION, SOLUTION INTRAVENOUS at 21:32

## 2023-01-01 RX ADMIN — Medication 10 ML: at 09:03

## 2023-01-01 RX ADMIN — TAZOBACTAM SODIUM AND PIPERACILLIN SODIUM 3.38 G: 375; 3 INJECTION, SOLUTION INTRAVENOUS at 16:41

## 2023-01-01 RX ADMIN — Medication 10 ML: at 10:56

## 2023-01-01 RX ADMIN — HEPARIN SODIUM 5000 UNITS: 5000 INJECTION INTRAVENOUS; SUBCUTANEOUS at 05:29

## 2023-01-01 RX ADMIN — LEVOTHYROXINE SODIUM 50 MCG: 50 TABLET ORAL at 05:59

## 2023-01-01 RX ADMIN — DEXTROSE MONOHYDRATE 25 G: 25 INJECTION, SOLUTION INTRAVENOUS at 11:11

## 2023-01-01 RX ADMIN — ENOXAPARIN SODIUM 40 MG: 40 INJECTION SUBCUTANEOUS at 17:55

## 2023-01-01 RX ADMIN — FOLIC ACID 1 MG: 1 TABLET ORAL at 08:50

## 2023-01-01 RX ADMIN — MIDODRINE HYDROCHLORIDE 10 MG: 10 TABLET ORAL at 18:10

## 2023-01-01 RX ADMIN — LEVOTHYROXINE SODIUM 50 MCG: 50 TABLET ORAL at 05:28

## 2023-01-01 RX ADMIN — Medication 10 ML: at 21:20

## 2023-01-01 RX ADMIN — HALOPERIDOL LACTATE 2 MG: 5 INJECTION, SOLUTION INTRAMUSCULAR at 11:00

## 2023-01-01 RX ADMIN — MIDODRINE HYDROCHLORIDE 5 MG: 5 TABLET ORAL at 08:54

## 2023-01-01 RX ADMIN — DIGOXIN 125 MCG: 125 TABLET ORAL at 11:40

## 2023-01-01 RX ADMIN — SODIUM CHLORIDE, POTASSIUM CHLORIDE, SODIUM LACTATE AND CALCIUM CHLORIDE 1000 ML: 600; 310; 30; 20 INJECTION, SOLUTION INTRAVENOUS at 14:55

## 2023-01-01 RX ADMIN — BUSPIRONE HYDROCHLORIDE 5 MG: 10 TABLET ORAL at 21:12

## 2023-01-01 RX ADMIN — Medication 0.26 MCG/KG/MIN: at 08:56

## 2023-01-01 RX ADMIN — MAGNESIUM SULFATE HEPTAHYDRATE 4 G: 40 INJECTION, SOLUTION INTRAVENOUS at 05:10

## 2023-01-01 RX ADMIN — LEVOTHYROXINE SODIUM 50 MCG: 50 TABLET ORAL at 05:10

## 2023-01-01 RX ADMIN — CITALOPRAM HYDROBROMIDE 10 MG: 10 TABLET ORAL at 09:13

## 2023-01-01 RX ADMIN — CHOLESTYRAMINE 4 G: 4 POWDER, FOR SUSPENSION ORAL at 17:21

## 2023-01-01 RX ADMIN — TAZOBACTAM SODIUM AND PIPERACILLIN SODIUM 3.38 G: 375; 3 INJECTION, SOLUTION INTRAVENOUS at 16:40

## 2023-01-01 RX ADMIN — TAZOBACTAM SODIUM AND PIPERACILLIN SODIUM 3.38 G: 375; 3 INJECTION, SOLUTION INTRAVENOUS at 23:22

## 2023-01-01 RX ADMIN — PANTOPRAZOLE SODIUM 40 MG: 40 TABLET, DELAYED RELEASE ORAL at 22:42

## 2023-01-01 RX ADMIN — Medication 10 ML: at 22:44

## 2023-01-01 RX ADMIN — ACETAMINOPHEN 1000 MG: 500 TABLET ORAL at 20:30

## 2023-01-01 RX ADMIN — HEPARIN SODIUM 5000 UNITS: 5000 INJECTION INTRAVENOUS; SUBCUTANEOUS at 14:27

## 2023-01-01 RX ADMIN — FUROSEMIDE 20 MG: 10 INJECTION, SOLUTION INTRAVENOUS at 17:56

## 2023-01-01 RX ADMIN — TAZOBACTAM SODIUM AND PIPERACILLIN SODIUM 3.38 G: 375; 3 INJECTION, SOLUTION INTRAVENOUS at 00:09

## 2023-01-01 RX ADMIN — HEPARIN SODIUM 5000 UNITS: 5000 INJECTION INTRAVENOUS; SUBCUTANEOUS at 20:50

## 2023-01-01 RX ADMIN — MIDODRINE HYDROCHLORIDE 10 MG: 10 TABLET ORAL at 10:59

## 2023-01-01 RX ADMIN — CITALOPRAM HYDROBROMIDE 10 MG: 10 TABLET ORAL at 18:02

## 2023-01-01 RX ADMIN — PANTOPRAZOLE SODIUM 40 MG: 40 TABLET, DELAYED RELEASE ORAL at 20:18

## 2023-01-01 RX ADMIN — DEXTROSE MONOHYDRATE 25 G: 25 INJECTION, SOLUTION INTRAVENOUS at 16:23

## 2023-01-01 RX ADMIN — HEPARIN SODIUM 5000 UNITS: 5000 INJECTION INTRAVENOUS; SUBCUTANEOUS at 08:51

## 2023-01-01 RX ADMIN — DIGOXIN 250 MCG: 0.25 INJECTION INTRAMUSCULAR; INTRAVENOUS at 13:42

## 2023-01-01 RX ADMIN — PANTOPRAZOLE SODIUM 40 MG: 40 TABLET, DELAYED RELEASE ORAL at 21:23

## 2023-01-01 RX ADMIN — Medication 10 ML: at 21:11

## 2023-01-01 RX ADMIN — LEVOTHYROXINE SODIUM 50 MCG: 50 TABLET ORAL at 10:17

## 2023-01-01 RX ADMIN — DOPAMINE HYDROCHLORIDE 5 MCG/KG/MIN: 160 INJECTION, SOLUTION INTRAVENOUS at 22:38

## 2023-01-01 RX ADMIN — Medication 10 ML: at 09:04

## 2023-01-01 RX ADMIN — MIDODRINE HYDROCHLORIDE 5 MG: 5 TABLET ORAL at 10:34

## 2023-01-01 RX ADMIN — CITALOPRAM HYDROBROMIDE 10 MG: 20 TABLET ORAL at 09:10

## 2023-01-01 RX ADMIN — MIDODRINE HYDROCHLORIDE 10 MG: 10 TABLET ORAL at 10:17

## 2023-01-01 RX ADMIN — MIDODRINE HYDROCHLORIDE 10 MG: 10 TABLET ORAL at 11:31

## 2023-01-01 RX ADMIN — POTASSIUM CHLORIDE 40 MEQ: 1.5 POWDER, FOR SOLUTION ORAL at 06:14

## 2023-01-01 RX ADMIN — TAZOBACTAM SODIUM AND PIPERACILLIN SODIUM 3.38 G: 375; 3 INJECTION, SOLUTION INTRAVENOUS at 08:53

## 2023-01-01 RX ADMIN — CHOLESTYRAMINE 4 G: 4 POWDER, FOR SUSPENSION ORAL at 08:54

## 2023-01-01 RX ADMIN — DICLOFENAC 2 G: 10 GEL TOPICAL at 08:54

## 2023-01-01 RX ADMIN — CHOLESTYRAMINE 4 G: 4 POWDER, FOR SUSPENSION ORAL at 17:17

## 2023-01-01 RX ADMIN — Medication 0.12 MCG/KG/MIN: at 20:42

## 2023-01-01 RX ADMIN — DEXTROSE MONOHYDRATE 25 G: 25 INJECTION, SOLUTION INTRAVENOUS at 07:13

## 2023-01-01 RX ADMIN — SODIUM CHLORIDE, POTASSIUM CHLORIDE, SODIUM LACTATE AND CALCIUM CHLORIDE 1000 ML: 600; 310; 30; 20 INJECTION, SOLUTION INTRAVENOUS at 21:22

## 2023-01-01 RX ADMIN — PANTOPRAZOLE SODIUM 40 MG: 40 TABLET, DELAYED RELEASE ORAL at 23:25

## 2023-01-01 RX ADMIN — DICLOFENAC 2 G: 10 GEL TOPICAL at 22:05

## 2023-01-01 RX ADMIN — MIDODRINE HYDROCHLORIDE 5 MG: 5 TABLET ORAL at 11:15

## 2023-01-01 RX ADMIN — CEFEPIME HYDROCHLORIDE 1 G: 1 INJECTION, POWDER, FOR SOLUTION INTRAMUSCULAR; INTRAVENOUS at 05:44

## 2023-01-01 RX ADMIN — DEXTROSE MONOHYDRATE 50 ML/HR: 100 INJECTION, SOLUTION INTRAVENOUS at 22:35

## 2023-01-01 RX ADMIN — CHOLESTYRAMINE 4 G: 4 POWDER, FOR SUSPENSION ORAL at 10:17

## 2023-01-01 RX ADMIN — SODIUM CHLORIDE, POTASSIUM CHLORIDE, SODIUM LACTATE AND CALCIUM CHLORIDE 125 ML/HR: 600; 310; 30; 20 INJECTION, SOLUTION INTRAVENOUS at 22:08

## 2023-01-01 RX ADMIN — HALOPERIDOL LACTATE 2 MG: 5 INJECTION, SOLUTION INTRAMUSCULAR at 21:28

## 2023-01-01 RX ADMIN — LEVOTHYROXINE SODIUM 50 MCG: 50 TABLET ORAL at 06:22

## 2023-01-01 RX ADMIN — TAZOBACTAM SODIUM AND PIPERACILLIN SODIUM 3.38 G: 375; 3 INJECTION, SOLUTION INTRAVENOUS at 08:06

## 2023-01-01 RX ADMIN — ALBUMIN HUMAN 12.5 G: 0.25 SOLUTION INTRAVENOUS at 10:28

## 2023-01-01 RX ADMIN — CITALOPRAM HYDROBROMIDE 10 MG: 10 TABLET ORAL at 10:35

## 2023-01-01 RX ADMIN — MORPHINE SULFATE 2 MG: 2 INJECTION, SOLUTION INTRAMUSCULAR; INTRAVENOUS at 12:02

## 2023-01-01 RX ADMIN — HEPARIN SODIUM 5000 UNITS: 5000 INJECTION INTRAVENOUS; SUBCUTANEOUS at 21:12

## 2023-01-01 RX ADMIN — POTASSIUM CHLORIDE 40 MEQ: 1.5 POWDER, FOR SOLUTION ORAL at 10:19

## 2023-01-01 RX ADMIN — LEVOTHYROXINE SODIUM 50 MCG: 50 TABLET ORAL at 09:08

## 2023-01-01 RX ADMIN — MORPHINE SULFATE 2 MG: 2 INJECTION, SOLUTION INTRAMUSCULAR; INTRAVENOUS at 08:11

## 2023-01-01 RX ADMIN — SODIUM CHLORIDE 100 ML/HR: 9 INJECTION, SOLUTION INTRAVENOUS at 23:22

## 2023-01-01 RX ADMIN — Medication 10 ML: at 09:10

## 2023-01-01 RX ADMIN — CHOLESTYRAMINE 4 G: 4 POWDER, FOR SUSPENSION ORAL at 09:02

## 2023-01-01 RX ADMIN — MORPHINE SULFATE 2 MG: 2 INJECTION, SOLUTION INTRAMUSCULAR; INTRAVENOUS at 14:38

## 2023-01-01 RX ADMIN — CHOLESTYRAMINE 4 G: 4 POWDER, FOR SUSPENSION ORAL at 12:30

## 2023-01-01 RX ADMIN — MIDODRINE HYDROCHLORIDE 10 MG: 10 TABLET ORAL at 09:06

## 2023-01-01 RX ADMIN — MIDODRINE HYDROCHLORIDE 10 MG: 10 TABLET ORAL at 18:57

## 2023-01-01 RX ADMIN — HEPARIN SODIUM 5000 UNITS: 5000 INJECTION INTRAVENOUS; SUBCUTANEOUS at 22:04

## 2023-01-01 RX ADMIN — Medication 0.08 MCG/KG/MIN: at 09:05

## 2023-01-01 RX ADMIN — DICLOFENAC 2 G: 10 GEL TOPICAL at 18:11

## 2023-01-01 RX ADMIN — Medication 10 ML: at 10:36

## 2023-01-01 RX ADMIN — SODIUM CHLORIDE, POTASSIUM CHLORIDE, SODIUM LACTATE AND CALCIUM CHLORIDE 125 ML/HR: 600; 310; 30; 20 INJECTION, SOLUTION INTRAVENOUS at 20:49

## 2023-01-01 RX ADMIN — MORPHINE SULFATE 2 MG: 2 INJECTION, SOLUTION INTRAMUSCULAR; INTRAVENOUS at 20:30

## 2023-01-01 RX ADMIN — FOLIC ACID 1 MG: 1 TABLET ORAL at 07:44

## 2023-01-01 RX ADMIN — TAZOBACTAM SODIUM AND PIPERACILLIN SODIUM 3.38 G: 375; 3 INJECTION, SOLUTION INTRAVENOUS at 07:44

## 2023-01-01 RX ADMIN — FOLIC ACID 1 MG: 1 TABLET ORAL at 08:14

## 2023-01-01 RX ADMIN — FOLIC ACID 1 MG: 1 TABLET ORAL at 08:53

## 2023-01-01 RX ADMIN — DICLOFENAC 2 G: 10 GEL TOPICAL at 12:49

## 2023-01-01 RX ADMIN — FOLIC ACID 1 MG: 1 TABLET ORAL at 08:55

## 2023-01-01 RX ADMIN — BUSPIRONE HYDROCHLORIDE 5 MG: 10 TABLET ORAL at 09:06

## 2023-01-01 RX ADMIN — SODIUM CHLORIDE, POTASSIUM CHLORIDE, SODIUM LACTATE AND CALCIUM CHLORIDE 125 ML/HR: 600; 310; 30; 20 INJECTION, SOLUTION INTRAVENOUS at 04:59

## 2023-01-01 RX ADMIN — POTASSIUM & SODIUM PHOSPHATES POWDER PACK 280-160-250 MG 2 PACKET: 280-160-250 PACK at 06:14

## 2023-01-01 RX ADMIN — ENOXAPARIN SODIUM 40 MG: 40 INJECTION SUBCUTANEOUS at 18:09

## 2023-01-01 RX ADMIN — MEGESTROL ACETATE 800 MG: 40 SUSPENSION ORAL at 08:59

## 2023-01-01 RX ADMIN — DIGOXIN 125 MCG: 125 TABLET ORAL at 11:59

## 2023-01-01 RX ADMIN — Medication 0.02 MCG/KG/MIN: at 16:49

## 2023-01-01 RX ADMIN — IPRATROPIUM BROMIDE AND ALBUTEROL SULFATE 3 ML: .5; 3 SOLUTION RESPIRATORY (INHALATION) at 03:15

## 2023-01-01 RX ADMIN — DEXTROSE MONOHYDRATE 25 G: 25 INJECTION, SOLUTION INTRAVENOUS at 12:34

## 2023-01-01 RX ADMIN — MIDODRINE HYDROCHLORIDE 10 MG: 10 TABLET ORAL at 06:14

## 2023-01-01 RX ADMIN — HEPARIN SODIUM 5000 UNITS: 5000 INJECTION INTRAVENOUS; SUBCUTANEOUS at 14:26

## 2023-01-01 RX ADMIN — PANTOPRAZOLE SODIUM 40 MG: 40 TABLET, DELAYED RELEASE ORAL at 21:12

## 2023-01-01 RX ADMIN — DICLOFENAC 2 G: 10 GEL TOPICAL at 18:58

## 2023-01-01 RX ADMIN — PANTOPRAZOLE SODIUM 40 MG: 40 TABLET, DELAYED RELEASE ORAL at 06:16

## 2023-01-01 RX ADMIN — HEPARIN SODIUM 5000 UNITS: 5000 INJECTION INTRAVENOUS; SUBCUTANEOUS at 21:20

## 2023-01-01 RX ADMIN — METHYLPREDNISOLONE SODIUM SUCCINATE 40 MG: 40 INJECTION, POWDER, FOR SOLUTION INTRAMUSCULAR; INTRAVENOUS at 20:36

## 2023-01-01 RX ADMIN — TAZOBACTAM SODIUM AND PIPERACILLIN SODIUM 3.38 G: 375; 3 INJECTION, SOLUTION INTRAVENOUS at 08:20

## 2023-01-01 RX ADMIN — SODIUM CHLORIDE 50 ML/HR: 9 INJECTION, SOLUTION INTRAVENOUS at 04:52

## 2023-01-01 RX ADMIN — MIDODRINE HYDROCHLORIDE 10 MG: 10 TABLET ORAL at 17:33

## 2023-01-01 RX ADMIN — CITALOPRAM HYDROBROMIDE 10 MG: 20 TABLET ORAL at 08:50

## 2023-01-01 RX ADMIN — MIDODRINE HYDROCHLORIDE 10 MG: 10 TABLET ORAL at 06:45

## 2023-01-01 RX ADMIN — MAGNESIUM SULFATE HEPTAHYDRATE 2 G: 2 INJECTION, SOLUTION INTRAVENOUS at 22:24

## 2023-01-01 RX ADMIN — Medication 10 ML: at 20:45

## 2023-01-01 RX ADMIN — Medication 0.09 MCG/KG/MIN: at 10:27

## 2023-01-01 RX ADMIN — MORPHINE SULFATE 2 MG: 2 INJECTION, SOLUTION INTRAMUSCULAR; INTRAVENOUS at 09:33

## 2023-01-01 RX ADMIN — LEVOTHYROXINE SODIUM 50 MCG: 50 TABLET ORAL at 08:50

## 2023-01-01 RX ADMIN — TAZOBACTAM SODIUM AND PIPERACILLIN SODIUM 3.38 G: 375; 3 INJECTION, SOLUTION INTRAVENOUS at 00:06

## 2023-01-01 RX ADMIN — VANCOMYCIN HYDROCHLORIDE 1750 MG: 10 INJECTION, POWDER, LYOPHILIZED, FOR SOLUTION INTRAVENOUS at 11:15

## 2023-01-01 RX ADMIN — TAZOBACTAM SODIUM AND PIPERACILLIN SODIUM 3.38 G: 375; 3 INJECTION, SOLUTION INTRAVENOUS at 15:33

## 2023-01-01 RX ADMIN — HEPARIN SODIUM 5000 UNITS: 5000 INJECTION INTRAVENOUS; SUBCUTANEOUS at 05:00

## 2023-01-01 RX ADMIN — MIDODRINE HYDROCHLORIDE 10 MG: 10 TABLET ORAL at 18:02

## 2023-01-01 RX ADMIN — ALBUMIN (HUMAN) 12.5 G: 0.25 INJECTION, SOLUTION INTRAVENOUS at 09:33

## 2023-01-01 RX ADMIN — SODIUM CHLORIDE 100 ML/HR: 9 INJECTION, SOLUTION INTRAVENOUS at 15:10

## 2023-01-01 RX ADMIN — TAZOBACTAM SODIUM AND PIPERACILLIN SODIUM 3.38 G: 375; 3 INJECTION, SOLUTION INTRAVENOUS at 16:03

## 2023-01-01 RX ADMIN — VANCOMYCIN HYDROCHLORIDE 2000 MG: 10 INJECTION, POWDER, LYOPHILIZED, FOR SOLUTION INTRAVENOUS at 16:45

## 2023-01-01 RX ADMIN — TAZOBACTAM SODIUM AND PIPERACILLIN SODIUM 3.38 G: 375; 3 INJECTION, SOLUTION INTRAVENOUS at 15:04

## 2023-01-01 RX ADMIN — HEPARIN SODIUM 5000 UNITS: 5000 INJECTION INTRAVENOUS; SUBCUTANEOUS at 21:03

## 2023-01-01 RX ADMIN — Medication 0.2 MCG/KG/MIN: at 02:28

## 2023-01-01 RX ADMIN — MORPHINE SULFATE 2 MG: 2 INJECTION, SOLUTION INTRAMUSCULAR; INTRAVENOUS at 12:50

## 2023-01-01 RX ADMIN — CEFEPIME 2 G: 2 INJECTION, POWDER, FOR SOLUTION INTRAVENOUS at 18:09

## 2023-01-01 RX ADMIN — TAZOBACTAM SODIUM AND PIPERACILLIN SODIUM 3.38 G: 375; 3 INJECTION, SOLUTION INTRAVENOUS at 17:43

## 2023-01-01 RX ADMIN — Medication 10 ML: at 10:19

## 2023-01-01 RX ADMIN — HEPARIN SODIUM 5000 UNITS: 5000 INJECTION INTRAVENOUS; SUBCUTANEOUS at 06:15

## 2023-01-01 RX ADMIN — PANTOPRAZOLE SODIUM 40 MG: 40 TABLET, DELAYED RELEASE ORAL at 06:46

## 2023-01-01 RX ADMIN — HEPARIN SODIUM 5000 UNITS: 5000 INJECTION INTRAVENOUS; SUBCUTANEOUS at 13:42

## 2023-01-01 RX ADMIN — Medication 10 ML: at 08:55

## 2023-01-01 RX ADMIN — ALBUMIN (HUMAN) 25 G: 0.25 INJECTION, SOLUTION INTRAVENOUS at 19:50

## 2023-01-01 RX ADMIN — DICLOFENAC 2 G: 10 GEL TOPICAL at 14:32

## 2023-01-01 RX ADMIN — ALBUMIN (HUMAN) 50 G: 0.25 INJECTION, SOLUTION INTRAVENOUS at 08:06

## 2023-01-01 RX ADMIN — HEPARIN SODIUM 5000 UNITS: 5000 INJECTION INTRAVENOUS; SUBCUTANEOUS at 05:27

## 2023-01-01 RX ADMIN — MORPHINE SULFATE 2 MG: 2 INJECTION, SOLUTION INTRAMUSCULAR; INTRAVENOUS at 16:39

## 2023-01-01 RX ADMIN — LEVOTHYROXINE SODIUM 50 MCG: 50 TABLET ORAL at 05:29

## 2023-01-01 RX ADMIN — HEPARIN SODIUM 5000 UNITS: 5000 INJECTION INTRAVENOUS; SUBCUTANEOUS at 06:22

## 2023-01-01 RX ADMIN — SODIUM CHLORIDE, POTASSIUM CHLORIDE, SODIUM LACTATE AND CALCIUM CHLORIDE 125 ML/HR: 600; 310; 30; 20 INJECTION, SOLUTION INTRAVENOUS at 20:23

## 2023-01-01 RX ADMIN — CHOLESTYRAMINE 4 G: 4 POWDER, FOR SUSPENSION ORAL at 17:33

## 2023-01-01 RX ADMIN — DICLOFENAC 2 G: 10 GEL TOPICAL at 12:43

## 2023-01-01 RX ADMIN — LEVOTHYROXINE SODIUM 50 MCG: 50 TABLET ORAL at 05:19

## 2023-01-01 RX ADMIN — POTASSIUM PHOSPHATE, MONOBASIC POTASSIUM PHOSPHATE, DIBASIC 15 MMOL: 224; 236 INJECTION, SOLUTION, CONCENTRATE INTRAVENOUS at 18:26

## 2023-01-01 RX ADMIN — FOLIC ACID 1 MG: 1 TABLET ORAL at 09:02

## 2023-01-01 RX ADMIN — LIDOCAINE HYDROCHLORIDE: 20 JELLY TOPICAL at 13:11

## 2023-01-01 RX ADMIN — SODIUM CHLORIDE, POTASSIUM CHLORIDE, SODIUM LACTATE AND CALCIUM CHLORIDE 1000 ML: 600; 310; 30; 20 INJECTION, SOLUTION INTRAVENOUS at 13:51

## 2023-01-01 RX ADMIN — MIDODRINE HYDROCHLORIDE 5 MG: 5 TABLET ORAL at 06:30

## 2023-01-01 RX ADMIN — FOLIC ACID 1 MG: 1 TABLET ORAL at 09:06

## 2023-01-01 RX ADMIN — HEPARIN SODIUM 5000 UNITS: 5000 INJECTION INTRAVENOUS; SUBCUTANEOUS at 17:43

## 2023-01-01 RX ADMIN — SODIUM CHLORIDE 100 ML/HR: 9 INJECTION, SOLUTION INTRAVENOUS at 08:19

## 2023-01-01 RX ADMIN — Medication 0.14 MCG/KG/MIN: at 08:19

## 2023-01-01 RX ADMIN — TAZOBACTAM SODIUM AND PIPERACILLIN SODIUM 3.38 G: 375; 3 INJECTION, SOLUTION INTRAVENOUS at 08:55

## 2023-01-01 RX ADMIN — POTASSIUM PHOSPHATE, MONOBASIC POTASSIUM PHOSPHATE, DIBASIC 15 MMOL: 224; 236 INJECTION, SOLUTION, CONCENTRATE INTRAVENOUS at 17:17

## 2023-01-01 RX ADMIN — HALOPERIDOL LACTATE 2 MG: 5 INJECTION, SOLUTION INTRAMUSCULAR at 16:18

## 2023-01-01 RX ADMIN — HALOPERIDOL LACTATE 2 MG: 5 INJECTION, SOLUTION INTRAMUSCULAR at 09:13

## 2023-01-01 RX ADMIN — ENOXAPARIN SODIUM 40 MG: 40 INJECTION SUBCUTANEOUS at 18:01

## 2023-01-01 RX ADMIN — Medication 10 ML: at 08:19

## 2023-01-01 RX ADMIN — CEFEPIME 2 G: 2 INJECTION, POWDER, FOR SOLUTION INTRAVENOUS at 05:56

## 2023-01-01 RX ADMIN — FUROSEMIDE 40 MG: 10 INJECTION, SOLUTION INTRAMUSCULAR; INTRAVENOUS at 10:08

## 2023-01-01 RX ADMIN — HALOPERIDOL LACTATE 2 MG: 5 INJECTION, SOLUTION INTRAMUSCULAR at 08:01

## 2023-01-01 RX ADMIN — SODIUM CHLORIDE, POTASSIUM CHLORIDE, SODIUM LACTATE AND CALCIUM CHLORIDE 125 ML/HR: 600; 310; 30; 20 INJECTION, SOLUTION INTRAVENOUS at 13:44

## 2023-01-01 RX ADMIN — CHOLESTYRAMINE 4 G: 4 POWDER, FOR SUSPENSION ORAL at 09:07

## 2023-01-01 RX ADMIN — DEXTROSE MONOHYDRATE 50 ML/HR: 100 INJECTION, SOLUTION INTRAVENOUS at 17:56

## 2023-01-01 RX ADMIN — Medication 0.02 MCG/KG/MIN: at 13:00

## 2023-01-01 RX ADMIN — MEGESTROL ACETATE 800 MG: 40 SUSPENSION ORAL at 10:36

## 2023-01-01 RX ADMIN — MEGESTROL ACETATE 800 MG: 40 SUSPENSION ORAL at 16:28

## 2023-01-01 RX ADMIN — CHOLESTYRAMINE 4 G: 4 POWDER, FOR SUSPENSION ORAL at 08:51

## 2023-01-01 RX ADMIN — TAZOBACTAM SODIUM AND PIPERACILLIN SODIUM 3.38 G: 375; 3 INJECTION, SOLUTION INTRAVENOUS at 17:56

## 2023-01-01 RX ADMIN — PANTOPRAZOLE SODIUM 40 MG: 40 TABLET, DELAYED RELEASE ORAL at 10:35

## 2023-01-01 RX ADMIN — MIDODRINE HYDROCHLORIDE 5 MG: 5 TABLET ORAL at 06:15

## 2023-01-01 RX ADMIN — MORPHINE SULFATE 2 MG: 2 INJECTION, SOLUTION INTRAMUSCULAR; INTRAVENOUS at 21:10

## 2023-01-01 RX ADMIN — MIDODRINE HYDROCHLORIDE 5 MG: 5 TABLET ORAL at 12:18

## 2023-01-01 RX ADMIN — MEGESTROL ACETATE 800 MG: 40 SUSPENSION ORAL at 09:07

## 2023-01-01 RX ADMIN — MIDODRINE HYDROCHLORIDE 5 MG: 5 TABLET ORAL at 17:21

## 2023-01-01 RX ADMIN — MORPHINE SULFATE 2 MG: 2 INJECTION, SOLUTION INTRAMUSCULAR; INTRAVENOUS at 09:47

## 2023-01-01 RX ADMIN — Medication 0.22 MCG/KG/MIN: at 18:42

## 2023-01-01 RX ADMIN — HEPARIN SODIUM 5000 UNITS: 5000 INJECTION INTRAVENOUS; SUBCUTANEOUS at 05:59

## 2023-01-01 RX ADMIN — MIDODRINE HYDROCHLORIDE 10 MG: 10 TABLET ORAL at 08:53

## 2023-01-01 RX ADMIN — MAGNESIUM SULFATE HEPTAHYDRATE 2 G: 2 INJECTION, SOLUTION INTRAVENOUS at 23:49

## 2023-01-01 RX ADMIN — Medication 10 ML: at 21:13

## 2023-01-01 RX ADMIN — KETOROLAC TROMETHAMINE 15 MG: 15 INJECTION, SOLUTION INTRAMUSCULAR; INTRAVENOUS at 17:53

## 2023-01-01 RX ADMIN — POTASSIUM CHLORIDE 40 MEQ: 1.5 POWDER, FOR SOLUTION ORAL at 05:10

## 2023-01-01 RX ADMIN — HEPARIN SODIUM 5000 UNITS: 5000 INJECTION INTRAVENOUS; SUBCUTANEOUS at 15:33

## 2023-01-01 RX ADMIN — Medication 10 ML: at 21:00

## 2023-01-01 RX ADMIN — HEPARIN SODIUM 5000 UNITS: 5000 INJECTION INTRAVENOUS; SUBCUTANEOUS at 22:25

## 2023-01-01 RX ADMIN — MIDODRINE HYDROCHLORIDE 5 MG: 5 TABLET ORAL at 07:44

## 2023-01-01 RX ADMIN — MAGNESIUM SULFATE HEPTAHYDRATE 2 G: 2 INJECTION, SOLUTION INTRAVENOUS at 05:59

## 2023-01-01 RX ADMIN — POTASSIUM CHLORIDE 40 MEQ: 1.5 POWDER, FOR SOLUTION ORAL at 08:53

## 2023-01-01 RX ADMIN — Medication 10 ML: at 22:12

## 2023-01-01 RX ADMIN — MIDODRINE HYDROCHLORIDE 10 MG: 10 TABLET ORAL at 12:48

## 2023-01-01 RX ADMIN — DICLOFENAC 2 G: 10 GEL TOPICAL at 17:33

## 2023-01-01 RX ADMIN — Medication 10 ML: at 21:38

## 2023-01-01 RX ADMIN — SODIUM CHLORIDE 1000 ML: 9 INJECTION, SOLUTION INTRAVENOUS at 16:20

## 2023-01-01 RX ADMIN — MIDODRINE HYDROCHLORIDE 10 MG: 10 TABLET ORAL at 12:28

## 2023-01-01 RX ADMIN — HALOPERIDOL LACTATE 2 MG: 5 INJECTION, SOLUTION INTRAMUSCULAR at 14:45

## 2023-01-01 RX ADMIN — MIDODRINE HYDROCHLORIDE 5 MG: 5 TABLET ORAL at 18:52

## 2023-01-01 RX ADMIN — MIDODRINE HYDROCHLORIDE 5 MG: 5 TABLET ORAL at 16:03

## 2023-01-01 RX ADMIN — PANTOPRAZOLE SODIUM 40 MG: 40 TABLET, DELAYED RELEASE ORAL at 23:26

## 2023-01-01 RX ADMIN — Medication 10 ML: at 07:44

## 2023-01-01 RX ADMIN — DIGOXIN 125 MCG: 125 TABLET ORAL at 11:22

## 2023-01-01 RX ADMIN — PANTOPRAZOLE SODIUM 40 MG: 40 TABLET, DELAYED RELEASE ORAL at 21:54

## 2023-01-01 RX ADMIN — CHOLESTYRAMINE 4 G: 4 POWDER, FOR SUSPENSION ORAL at 18:10

## 2023-01-01 RX ADMIN — Medication 0.28 MCG/KG/MIN: at 13:43

## 2023-01-01 RX ADMIN — DICLOFENAC 2 G: 10 GEL TOPICAL at 08:50

## 2023-01-01 RX ADMIN — ENOXAPARIN SODIUM 40 MG: 40 INJECTION SUBCUTANEOUS at 16:42

## 2023-01-01 RX ADMIN — SODIUM CHLORIDE 50 ML/HR: 9 INJECTION, SOLUTION INTRAVENOUS at 14:05

## 2023-01-01 RX ADMIN — HEPARIN SODIUM 5000 UNITS: 5000 INJECTION INTRAVENOUS; SUBCUTANEOUS at 13:12

## 2023-01-01 RX ADMIN — LOPERAMIDE HYDROCHLORIDE 2 MG: 2 CAPSULE ORAL at 16:59

## 2023-01-01 RX ADMIN — Medication 0.22 MCG/KG/MIN: at 03:28

## 2023-01-01 RX ADMIN — TAZOBACTAM SODIUM AND PIPERACILLIN SODIUM 3.38 G: 375; 3 INJECTION, SOLUTION INTRAVENOUS at 01:56

## 2023-01-01 RX ADMIN — CEFEPIME HYDROCHLORIDE 1 G: 1 INJECTION, POWDER, FOR SOLUTION INTRAMUSCULAR; INTRAVENOUS at 16:33

## 2023-01-01 RX ADMIN — IPRATROPIUM BROMIDE AND ALBUTEROL SULFATE 3 ML: .5; 3 SOLUTION RESPIRATORY (INHALATION) at 04:25

## 2023-01-01 RX ADMIN — HEPARIN SODIUM 5000 UNITS: 5000 INJECTION INTRAVENOUS; SUBCUTANEOUS at 05:19

## 2023-01-01 RX ADMIN — POTASSIUM CHLORIDE 20 MEQ: 29.8 INJECTION, SOLUTION INTRAVENOUS at 10:30

## 2023-01-01 RX ADMIN — DICLOFENAC 2 G: 10 GEL TOPICAL at 12:17

## 2023-01-01 RX ADMIN — TAZOBACTAM SODIUM AND PIPERACILLIN SODIUM 3.38 G: 375; 3 INJECTION, SOLUTION INTRAVENOUS at 23:45

## 2023-01-01 RX ADMIN — SODIUM CHLORIDE 2586 ML: 9 INJECTION, SOLUTION INTRAVENOUS at 11:15

## 2023-01-01 RX ADMIN — MIDODRINE HYDROCHLORIDE 10 MG: 10 TABLET ORAL at 16:32

## 2023-01-01 RX ADMIN — DICLOFENAC 2 G: 10 GEL TOPICAL at 22:13

## 2023-01-01 RX ADMIN — TAZOBACTAM SODIUM AND PIPERACILLIN SODIUM 3.38 G: 375; 3 INJECTION, SOLUTION INTRAVENOUS at 23:25

## 2023-01-01 RX ADMIN — Medication 10 ML: at 09:14

## 2023-01-01 RX ADMIN — Medication 10 ML: at 20:24

## 2023-01-01 RX ADMIN — MIDODRINE HYDROCHLORIDE 10 MG: 10 TABLET ORAL at 17:08

## 2023-01-01 RX ADMIN — HEPARIN SODIUM 5000 UNITS: 5000 INJECTION INTRAVENOUS; SUBCUTANEOUS at 10:18

## 2023-01-01 RX ADMIN — COSYNTROPIN 0.25 MG: 0.25 INJECTION, POWDER, LYOPHILIZED, FOR SOLUTION INTRAVENOUS at 21:54

## 2023-01-01 RX ADMIN — TRAMADOL HYDROCHLORIDE 50 MG: 50 TABLET ORAL at 12:27

## 2023-01-01 RX ADMIN — MORPHINE SULFATE 2 MG: 2 INJECTION, SOLUTION INTRAMUSCULAR; INTRAVENOUS at 18:28

## 2023-01-01 RX ADMIN — SULFUR HEXAFLUORIDE 3 ML: KIT at 16:22

## 2023-01-01 RX ADMIN — BUMETANIDE 2 MG: 0.25 INJECTION, SOLUTION INTRAMUSCULAR; INTRAVENOUS at 12:16

## 2023-01-01 RX ADMIN — FOLIC ACID 1 MG: 1 TABLET ORAL at 11:39

## 2023-01-01 RX ADMIN — Medication 10 ML: at 22:04

## 2023-01-01 RX ADMIN — FUROSEMIDE 80 MG: 10 INJECTION, SOLUTION INTRAMUSCULAR; INTRAVENOUS at 22:04

## 2023-01-01 RX ADMIN — HEPARIN SODIUM 5000 UNITS: 5000 INJECTION INTRAVENOUS; SUBCUTANEOUS at 22:43

## 2023-01-01 RX ADMIN — CHOLESTYRAMINE 4 G: 4 POWDER, FOR SUSPENSION ORAL at 18:58

## 2023-01-01 RX ADMIN — CEFEPIME HYDROCHLORIDE 1 G: 1 INJECTION, POWDER, FOR SOLUTION INTRAMUSCULAR; INTRAVENOUS at 21:04

## 2023-01-01 RX ADMIN — POTASSIUM CHLORIDE 40 MEQ: 1.5 POWDER, FOR SOLUTION ORAL at 14:26

## 2023-01-01 RX ADMIN — TAZOBACTAM SODIUM AND PIPERACILLIN SODIUM 3.38 G: 375; 3 INJECTION, SOLUTION INTRAVENOUS at 08:14

## 2023-01-01 RX ADMIN — PRENATAL VITAMINS-IRON FUMARATE 27 MG IRON-FOLIC ACID 0.8 MG TABLET 1 TABLET: at 09:13

## 2023-01-01 RX ADMIN — MIDODRINE HYDROCHLORIDE 10 MG: 10 TABLET ORAL at 12:34

## 2023-01-01 RX ADMIN — MORPHINE SULFATE 2 MG: 2 INJECTION, SOLUTION INTRAMUSCULAR; INTRAVENOUS at 17:14

## 2023-01-01 RX ADMIN — TAZOBACTAM SODIUM AND PIPERACILLIN SODIUM 3.38 G: 375; 3 INJECTION, SOLUTION INTRAVENOUS at 22:52

## 2023-01-01 RX ADMIN — HEPARIN SODIUM 5000 UNITS: 5000 INJECTION INTRAVENOUS; SUBCUTANEOUS at 22:52

## 2023-01-01 RX ADMIN — MIDODRINE HYDROCHLORIDE 5 MG: 5 TABLET ORAL at 16:41

## 2023-01-01 RX ADMIN — HEPARIN SODIUM 5000 UNITS: 5000 INJECTION INTRAVENOUS; SUBCUTANEOUS at 15:04

## 2023-01-01 RX ADMIN — DICLOFENAC 2 G: 10 GEL TOPICAL at 09:11

## 2023-01-01 RX ADMIN — LEVOTHYROXINE SODIUM 50 MCG: 50 TABLET ORAL at 06:43

## 2023-01-01 RX ADMIN — TAZOBACTAM SODIUM AND PIPERACILLIN SODIUM 3.38 G: 375; 3 INJECTION, SOLUTION INTRAVENOUS at 16:06

## 2023-01-01 RX ADMIN — Medication 0.14 MCG/KG/MIN: at 18:57

## 2023-01-01 RX ADMIN — BUSPIRONE HYDROCHLORIDE 5 MG: 10 TABLET ORAL at 08:50

## 2023-01-01 RX ADMIN — DIGOXIN 250 MCG: 250 TABLET ORAL at 23:26

## 2023-01-01 RX ADMIN — CHOLESTYRAMINE 4 G: 4 POWDER, FOR SUSPENSION ORAL at 08:14

## 2023-01-01 RX ADMIN — TAZOBACTAM SODIUM AND PIPERACILLIN SODIUM 3.38 G: 375; 3 INJECTION, SOLUTION INTRAVENOUS at 09:02

## 2023-01-01 RX ADMIN — MAGNESIUM SULFATE HEPTAHYDRATE 4 G: 40 INJECTION, SOLUTION INTRAVENOUS at 06:15

## 2023-01-01 RX ADMIN — HEPARIN SODIUM 5000 UNITS: 5000 INJECTION INTRAVENOUS; SUBCUTANEOUS at 13:30

## 2023-01-01 RX ADMIN — Medication 10 ML: at 08:27

## 2023-01-01 RX ADMIN — Medication 10 ML: at 11:12

## 2023-01-01 RX ADMIN — KETOROLAC TROMETHAMINE 15 MG: 15 INJECTION, SOLUTION INTRAMUSCULAR; INTRAVENOUS at 16:18

## 2023-01-01 RX ADMIN — MEGESTROL ACETATE 800 MG: 40 SUSPENSION ORAL at 18:02

## 2023-01-01 RX ADMIN — BUSPIRONE HYDROCHLORIDE 5 MG: 10 TABLET ORAL at 16:26

## 2023-01-01 RX ADMIN — QUETIAPINE FUMARATE 25 MG: 25 TABLET ORAL at 01:20

## 2023-01-01 RX ADMIN — Medication 10 ML: at 08:53

## 2023-01-01 RX ADMIN — TAZOBACTAM SODIUM AND PIPERACILLIN SODIUM 3.38 G: 375; 3 INJECTION, SOLUTION INTRAVENOUS at 15:10

## 2023-01-01 RX ADMIN — FOLIC ACID 1 MG: 1 TABLET ORAL at 10:18

## 2023-01-01 RX ADMIN — LIDOCAINE HYDROCHLORIDE: 20 JELLY TOPICAL at 14:15

## 2023-01-01 RX ADMIN — MEGESTROL ACETATE 800 MG: 40 SUSPENSION ORAL at 09:13

## 2023-01-01 RX ADMIN — HEPARIN SODIUM 5000 UNITS: 5000 INJECTION INTRAVENOUS; SUBCUTANEOUS at 05:09

## 2023-03-24 PROBLEM — N18.30 CKD (CHRONIC KIDNEY DISEASE) STAGE 3, GFR 30-59 ML/MIN: Status: ACTIVE | Noted: 2023-01-01

## 2023-03-24 PROBLEM — I10 ESSENTIAL HYPERTENSION: Status: ACTIVE | Noted: 2023-01-01

## 2023-03-24 PROBLEM — R65.21 SEPTIC SHOCK: Status: ACTIVE | Noted: 2023-01-01

## 2023-03-24 PROBLEM — I48.91 ATRIAL FIBRILLATION: Status: ACTIVE | Noted: 2023-01-01

## 2023-03-24 PROBLEM — R41.82 ALTERED MENTAL STATUS: Status: ACTIVE | Noted: 2023-01-01

## 2023-03-24 PROBLEM — A41.9 SEPSIS DUE TO URINARY TRACT INFECTION: Status: ACTIVE | Noted: 2023-03-24

## 2023-03-24 PROBLEM — A41.9 SEPTIC SHOCK: Status: ACTIVE | Noted: 2023-01-01

## 2023-03-24 PROBLEM — E03.9 HYPOTHYROIDISM: Status: ACTIVE | Noted: 2023-01-01

## 2023-03-24 PROBLEM — A41.9 SEPSIS, DUE TO UNSPECIFIED ORGANISM, UNSPECIFIED WHETHER ACUTE ORGAN DYSFUNCTION PRESENT: Status: ACTIVE | Noted: 2023-01-01

## 2023-03-24 PROBLEM — N39.0 SEPSIS DUE TO URINARY TRACT INFECTION: Status: ACTIVE | Noted: 2023-03-24

## 2023-03-24 PROBLEM — F03.90 DEMENTIA: Status: ACTIVE | Noted: 2023-01-01

## 2023-03-24 PROBLEM — M10.9 GOUT: Status: ACTIVE | Noted: 2023-01-01

## 2023-03-24 NOTE — H&P
"      Chief complaint: Altered mental status    Subjective     89-year-old male who is a resident of [facility] in [place].  He has a history, based on his records, of hypertension, gout, hypothyroidism, dementia, and chronic kidney disease.    He was sent to our ER earlier today due to \"talking gibberish\".  He is not able to give a reliable history but denies shortness of breath, chest pain, abdominal pain, headache, fever, vomiting, or diarrhea.    He was found to be in atrial fibrillation.  He was hypotensive.  He was given 2 L of fluid.  Lactate and procalcitonin were elevated.  Chest x-ray revealed no infiltrates.  He was having problems urinating but was able to give a small amount of urine with standing.  MRI was performed and was negative for an acute CVA.  He was given vancomycin and Zosyn.  He was placed on norepinephrine drip.  ICU admission was recommended due to the pressors.  He does carry a DO NOT RESUSCITATE status from the nursing home.      History  Past Medical History:   Diagnosis Date   • CKD (chronic kidney disease) stage 3, GFR 30-59 ml/min ([facility]) 3/24/2023   • Dementia ([facility]) 3/24/2023   • Essential hypertension 3/24/2023   • Gout 3/24/2023   • Hypothyroidism 3/24/2023     Past Surgical History:   Procedure Laterality Date   • CHOLECYSTECTOMY     • PROSTATE SURGERY     • REPLACEMENT TOTAL KNEE BILATERAL       History reviewed. No pertinent family history.  Social History     Tobacco Use   • Smoking status: Former     Types: Cigarettes       Review of Systems   Review of systems could not be obtained due to   patient confusion.      Objective     Vital Signs  Temp:  [97.6 °F (36.4 °C)] 97.6 °F (36.4 °C)  Heart Rate:  [] 107  Resp:  [14] 14  BP: ()/(26-93) 104/73    Physical Exam:    Objective:  General Appearance:  In no acute distress.    Vital signs: (most recent): Blood pressure 104/73, pulse 107, temperature 97.6 °F (36.4 °C), temperature source Oral, resp. " "rate 14, height 175.3 cm (69\"), weight 98.9 kg (218 lb), SpO2 98 %.    HEENT: Normal HEENT exam.    Lungs:  Normal effort and normal respiratory rate.  Breath sounds clear to auscultation.  He is not in respiratory distress.  No rales, wheezes or rhonchi.    Heart: Tachycardia.  Irregular rhythm.  S1 normal and S2 normal.  No murmur, gallop or friction rub.   Chest: Symmetric chest wall expansion.   Abdomen: Abdomen is soft and non-distended.  Bowel sounds are normal.   There is no abdominal tenderness.   There is no mass. There is no splenomegaly. There is no hepatomegaly.   Extremities: There is dependent edema.  There is no deformity.    Neurological: Patient is alert.  (Confused).    Pupils:  Pupils are equal, round, and reactive to light.    Skin:  Warm and dry.              Results Review:    I reviewed the patient's new clinical results.  I reviewed the patient's new imaging results and agree with the interpretation.  I reviewed the patient's other test results and agree with the interpretation  I personally viewed and interpreted the patient's EKG/Telemetry data    Assessment & Plan     Assessment:    Active Hospital Problems    Diagnosis    • **Septic shock (HCC)    • Sepsis due to urinary tract infection (HCC)    • Atrial fibrillation (HCC)    • Hypothyroidism    • Gout    • Essential hypertension    • Dementia (HCC)    • CKD (chronic kidney disease) stage 3, GFR 30-59 ml/min (HCC)    • Altered mental status        89-year-old male with what appears to be sepsis with shock.  He presents with altered mental status, hypotension, elevated lactic acid, and elevated procalcitonin.  Based upon his work-up thus far it appears he has a urinary tract infection.  He does appear to have enlarged prostate and possibly urinary retention chronically.  He does have CKD and his creatinine is not very far from a prior value I found from October of last year.  He has received 2 L of fluid and is on a small amount of " norepinephrine.    In regards to atrial fibrillation, will monitor.  The rate is relatively well controlled.  He is a [id number] 3    Plan:    1. Admit to ICU  2. Wean pressors  3. Continue fluid resuscitation  4. Monitor urine output and renal function  5. Vancomycin given x1  6. Continue Zosyn  7. Await culture data from blood and urine  8. Resume nursing home medications as appropriate  9. DNR/DNI status    I discussed the patients findings and my recommendations with patient, nursing staff and Dr. Mar.     Critical Care time spent in direct patient care: 45 minutes (excluding procedure time, if applicable) including high complexity decision making to assess, manipulate, and support vital organ system failure in this individual who has impairment of one or more vital organ systems such that there is a high probability of imminent or life threatening deterioration in the patient’s condition.      Evan Robles MD  Pulmonary and Critical Care Medicine  03/24/23  19:46 EDT

## 2023-03-24 NOTE — ED PROVIDER NOTES
"Subjective   History of Present Illness    Sent from NH for altered mental status    Pt sent from the nursing home for altered mental status  By report he was \"talking gibberish\" today.  Patient denies complaints, believes he is here to see a urologist but when I ask why he'd need a urologist his answer is incomprehensible nonsense.  I don't think he's reliable but he denies headache, fever, weakness, vomiting, diarrhea, abd pain, chest pain, cough or dyspnea.    Per charts her has a history of HTN, dementia, gout and thyroid disease.    History provided by:  Medical records and EMS personnel  History limited by:  Mental status change      Review of Systems   Unable to perform ROS: Mental status change   Constitutional: Negative for fever.   Respiratory: Negative for cough and shortness of breath.    Cardiovascular: Negative for chest pain.   Gastrointestinal: Negative for abdominal pain and vomiting.   Psychiatric/Behavioral: Positive for confusion.       Past Medical History:   Diagnosis Date   • CKD (chronic kidney disease) stage 3, GFR 30-59 ml/min ([facility]) 3/24/2023   • Dementia ([facility]) 3/24/2023   • Essential hypertension 3/24/2023   • Gout 3/24/2023   • Hypothyroidism 3/24/2023       Not on File    Past Surgical History:   Procedure Laterality Date   • CHOLECYSTECTOMY     • PROSTATE SURGERY     • REPLACEMENT TOTAL KNEE BILATERAL         History reviewed. No pertinent family history.    Social History     Socioeconomic History   • Marital status:    Tobacco Use   • Smoking status: Former     Types: Cigarettes           Objective   Physical Exam  Vitals and nursing note reviewed.   Constitutional:       General: He is not in acute distress.     Appearance: Normal appearance. He is not ill-appearing.   HENT:      Head: Normocephalic and atraumatic.      Mouth/Throat:      Mouth: Mucous membranes are moist.   Eyes:      General: No scleral icterus.        Right eye: No discharge.         Left eye: No " discharge.      Conjunctiva/sclera: Conjunctivae normal.   Cardiovascular:      Rate and Rhythm: Normal rate. Rhythm irregular.      Heart sounds: No murmur heard.  Pulmonary:      Effort: Pulmonary effort is normal. No respiratory distress.      Breath sounds: Normal breath sounds. No wheezing.   Abdominal:      General: Bowel sounds are normal. There is no distension.      Palpations: Abdomen is soft.      Tenderness: There is no abdominal tenderness. There is no guarding or rebound.   Musculoskeletal:         General: No swelling. Normal range of motion.      Cervical back: Normal range of motion and neck supple.   Skin:     General: Skin is warm and dry.      Findings: No rash.   Neurological:      General: No focal deficit present.      Mental Status: He is alert.      Comments: Possibly some left facial droop, hard to say never having seen him before.  Symmetric arms and legs. Speech easy to understand.   Psychiatric:         Mood and Affect: Mood normal.         Behavior: Behavior normal.         Thought Content: Thought content normal.         Critical Care  Performed by: Leroy Mar MD  Authorized by: Leroy Mar MD     Critical care provider statement:     Critical care time (minutes):  45    Critical care time was exclusive of:  Separately billable procedures and treating other patients    Critical care was necessary to treat or prevent imminent or life-threatening deterioration of the following conditions:  CNS failure or compromise and sepsis    Critical care was time spent personally by me on the following activities:  Evaluation of patient's response to treatment, examination of patient, obtaining history from patient or surrogate, ordering and performing treatments and interventions, ordering and review of laboratory studies, ordering and review of radiographic studies, re-evaluation of patient's condition and review of old charts    I assumed direction of critical care for this  patient from another provider in my specialty: no      Care discussed with: admitting provider                 ED Course         EKG AF.  There is no mention of it in NH notes, med list doesn't suggest AF and the minimal old information in Epic from [place] does not mention AF.  This may be new onset today.  Chads-vasc = 3     Hypotensive, fluids ordered.  Lactate and procalcitonin are up suggesting infection/sepsis but WBC is normal.  CXR NAD.      So far UA unobtainable due to prostate disease - pt can't pee and multiple staff have attempted cath.  Eventually UA obtained and hematuria is the major finding but also TNTC WBCs noted so UTI may be the case here.    Persistent hypotension despite 2L saline.  Levophed ordered and titrated.      No change in mental status on rechecks.  MRI brain negative for stroke.  Empiric abx given.  Admitted for further care.                             Medical Decision Making  New onset atrial fibrillation (HCC): acute illness or injury  Sepsis, due to unspecified organism, unspecified whether acute organ dysfunction present (HCC): complicated acute illness or injury with systemic symptoms that poses a threat to life or bodily functions  Amount and/or Complexity of Data Reviewed  Independent Historian: EMS  External Data Reviewed: notes.  Labs: ordered. Decision-making details documented in ED Course.  Radiology: ordered. Decision-making details documented in ED Course.  ECG/medicine tests: ordered and independent interpretation performed. Decision-making details documented in ED Course.      Risk  Prescription drug management.  Decision regarding hospitalization.      Critical Care  Total time providing critical care: 45 minutes      Final diagnoses:   Sepsis, due to unspecified organism, unspecified whether acute organ dysfunction present (HCC)   New onset atrial fibrillation (HCC)       ED Disposition  ED Disposition     ED Disposition   Decision to Admit    Condition   --     Comment   Level of Care: Critical Care [6]   Admitting Physician: HEAVEN LOPEZ [[id number]]               No follow-up provider specified.       Medication List      No changes were made to your prescriptions during this visit.          Leroy Mar MD  03/24/23 1931

## 2023-03-25 PROBLEM — E87.20 LACTIC ACIDOSIS: Status: ACTIVE | Noted: 2023-01-01

## 2023-03-25 NOTE — PROGRESS NOTES
"Intensivist Note     3/25/2023  Hospital Day: 1  * No surgery found *  ICU Stays Timeline            Hospital Admission: 03/24/23 1251 - Current  ICU stays: 1      In Date/Time Event Department ICU Stay Duration     03/24/23 1251 Admission formerly [facility] EMERGENCY DEPT      03/24/23 2214 Transfer In formerly [facility] 2A ICU 8 hours 59 minutes             Mr. Jeramie Ortiz, 89 y.o. male is followed for:    Septic shock (HCC)    Sepsis due to urinary tract infection (HCC)    Lactic acidosis    Altered mental status (acute encephalopathy)    Atrial fibrillation (HCC)    CKD stage 3, GFR 30-59 ml/min (HCC)    Hypothyroidism    Gout    Essential hypertension    Dementia (HCC)       SUBJECTIVE     89-year-old male who is a resident of [facility] in [place].  Records indicate PMH of hypertension, CKD, hypothyroidism, dementia, and gout.  Apparently became increasingly confused 3/24/2023 and was \"talking gibberish\".  Because of this he was transferred to [facility] ER.  He was unable to give a reliable history but denied dyspnea, chest pain, abdominal pain, headache, fever, chills, sweats, nausea, vomiting, or diarrhea.  At the time of arrival he was in atrial fibrillation with a heart rate of 112 and was hypotensive.  He had difficulty voiding but was able to give a small amount of urine with TNTC WBCs, 1+ bacteria, and positive for nitrites suggesting UTI.  A Ambrose was attempted but nursing was unsuccessful.  CT scan and MRI was performed and was negative for any acute intracerebral events and chest x-ray revealed no infiltrates.  He was empirically given vancomycin and Zosyn, and when pressure remained low post fluid bolus, was begun on norepinephrine.  Although patient has been a documented DNR/DNI, ICU admission was requested due to the need for pressors.    Interval history: Uneventful evening and although alert patient remains disoriented to situation and place at times.  Still with hemodynamic compromise and requiring " "norepinephrine at 0.28 to maintain blood pressure at 108/55.  Remains in atrial fibrillation with a rate of 108 but has been as high as 140 bpm.  Presently on IV fluids with LR to 125 cc an hour but UOP minimal and bladder scan reveals only 75 cc.  Because of this have not asked nursing to attempt placement of Ambrose catheter.  Creatinine however has improved since admission dropping from 1.72, to 1.48.  I did contact [facility] and let them know of the situation in case UOP picks up and he develops bladder distention and is unable to void.  They indicated they would come by and help if needed.  Patient remains afebrile and WBC only 10.97.  Patient remains on monotherapy with Zosyn at this time.            ROS: Per subjective, all other systems reviewed and were negative.    The patient's relevant PMH, PSH, FH, and SH were reviewed and updated in Epic as appropriate. Allergies and Medications reviewed.    OBJECTIVE     /54   Pulse 98   Temp 97.7 °F (36.5 °C) (Axillary)   Resp 16   Ht 175.3 cm (69\")   Wt 80.3 kg (177 lb 0.5 oz)   SpO2 97%   BMI 26.14 kg/m²           Flowsheet Rows    Flowsheet Row First Filed Value   Admission Height 175.3 cm (69\") Documented at 03/24/2023 1304   Admission Weight 98.9 kg (218 lb) Documented at 03/24/2023 1304        Intake & Output (last day)       03/24 0701  03/25 0700 03/25 0701  03/26 0700    P.O.  300    I.V. (mL/kg) 2048.7 (25.5) 1740.7 (21.7)    IV Piggyback 2600 276.1    Total Intake(mL/kg) 4648.7 (57.9) 2316.8 (28.9)    Urine (mL/kg/hr)  175 (0.2)    Total Output  175    Net +4648.7 +2141.8                Exam:  General Exam:  Elderly male supine in bed in NAD.  HEENT: Pupils equal and reactive. Nose and throat clear.  Neck:                          Supple, no JVD, thyromegaly, or adenopathy  Lungs: Clear to auscultation and percussion anteriorly and posteriorly.  No wheezes, rales, rhonchi  Cardiovascular: Irregularly irregular rhythm with a variable S1 and " normal S2.  No obvious murmurs or gallops  Abdomen: Soft nontender without organomegaly or masses.   and rectal: Deferred.  Extremities: No cyanosis or clubbing but obviously chronic 2+ lower extremity edema edema.  Neurologic:                 Symmetric strength but diffusely weak. No focal deficits.  Responds to simple questions appropriately      CXR 3/25/2023: Heart size normal.  Patchy inhomogeneous opacity in the right base unchanged (atelectasis versus infiltrate).  Chronically elevated left hemidiaphragm    INFUSIONS  lactated ringers, 125 mL/hr, Last Rate: 125 mL/hr (03/25/23 2208)  norepinephrine, 0.02-0.3 mcg/kg/min, Last Rate: 0.2 mcg/kg/min (03/25/23 2102)        Results from last 7 days   Lab Units 03/25/23  0327 03/24/23  1302   WBC 10*3/mm3 10.97* 7.13   HEMOGLOBIN g/dL 10.1* 9.2*   HEMATOCRIT % 33.9* 32.0*   PLATELETS 10*3/mm3 311 255     Results from last 7 days   Lab Units 03/25/23  0327 03/24/23  1302   SODIUM mmol/L 135* 139   POTASSIUM mmol/L 4.6 4.4   CHLORIDE mmol/L 103 103   CO2 mmol/L 17.0* 15.0*   BUN mg/dL 30* 28*   CREATININE mg/dL 1.48* 1.72*   GLUCOSE mg/dL 121* 94   CALCIUM mg/dL 8.8 9.0     Results from last 7 days   Lab Units 03/25/23  0327 03/24/23  1302   MAGNESIUM mg/dL 2.0 1.8   PHOSPHORUS mg/dL 2.9  --      Results from last 7 days   Lab Units 03/25/23  0327 03/24/23  1302   ALK PHOS U/L 1,179* 1,197*   BILIRUBIN mg/dL 1.2 1.5*   ALT (SGPT) U/L 7 8   AST (SGOT) U/L 25 23       No results found for: SEDRATE    Lab Results   Component Value Date    PROBNP 11,937.0 (H) 03/25/2023         Procalitonin Results:      Lab 03/24/23  1302   PROCALCITONIN 0.96*         Covid Tests    Common Labsle 3/24/23   COVID19 Not Detected            COVID LABS:  Results From Last 14 Days   Lab Units 03/25/23  0327 03/24/23  2126 03/24/23  1627 03/24/23  1302   PROBNP pg/mL 11,937.0*  --   --   --    LACTATE mmol/L  --  2.0 3.6* 7.3*   PROCALCITONIN ng/mL  --   --   --  0.96*   HSTROP T ng/L  --    --   --  50*          Lab Results   Component Value Date    TROPONINT 50 (H) 03/24/2023     Lab Results   Component Value Date    TSH 2.230 03/24/2023     Lab Results   Component Value Date    LACTATE 2.0 03/24/2023     No results found for: CORTISOL        I reviewed the patient's results, images and medication.    Assessment & Plan   ASSESSMENT        Septic shock (HCC)    Sepsis due to urinary tract infection (HCC)    Lactic acidosis    Altered mental status (acute encephalopathy)    Atrial fibrillation (HCC)    CKD stage 3, GFR 30-59 ml/min (HCC)    Hypothyroidism    Gout    Essential hypertension    Dementia (HCC)      DISCUSSION: Unclear if we are treating UTI, pneumonia, or both.  On empiric therapy WBC has come down nicely.  Blood pressure improved and renal numbers look better but still requiring pressors and UOP quite low.  We will continue fluids and try to wean norepinephrine. Does not appear to be in heart failure but proBNP is dramatically elevated at 11,937.  Echocardiogram reveals normal LV systolic function but evidence of some pulmonary hypertension with right ventricular dilatation and elevated RVSP of 35 to 45 mmHg.  Remains in atrial fibrillation and unclear if this is an acute process or chronic.  I want to avoid aggressive therapy with amiodarone at this time and while he is hypotensive need to avoid metoprolol.  Will use low-dose digoxin and observe.    PLAN     Continue fluids and follow-up renal function tomorrow or  Hopefully renal function will continue to improve and UOP will increase  If bladder becomes distended and unable to void will have urology place a Ambrose catheter  Bladder scan every 4 hours until tomorrow  Continue empiric antimicrobial coverage with Zosyn monotherapy  Follow-up CXR  Follow-up blood and urine cultures  Digoxin 0.25 IV now then 0.25 oral every 6 hours till loaded  EKG in a.m.  Lower extremity venous duplex to rule out occult DVT   Discuss situation with family  when they arrive    Plan of care and goals reviewed with multidisciplinary team at daily rounds.    I discussed the patient's findings and my recommendations with patient and nursing staff    Patient is critically ill due to innumerable problems as outlined above and has a high risk of life-threatening decline in condition.  They require continuous monitoring and frequent reassessment of condition for adjustment of management in order to lessen risk.  I visited the patient's bedside and interacted with nurse numerous times today.    Time spent Critical care 30 min (It does not include procedure time).    Electronically signed by Kingsley Anaya MD, 03/25/23, 7:13 AM EDT.   Pulmonary / Critical care medicine

## 2023-03-25 NOTE — PLAN OF CARE
Goal Outcome Evaluation:  Plan of Care Reviewed With: patient, son        Progress: no change       *remains d/o to situation, drowsy and sleeping most of the day, patient states that is his norm  *SBP 80-120s, wean levo as tolerated, afib (dig ordered), saturations >92 on Ra  *LR 125ml   *Gen edema +3-4 pitting   *Regular diet ordered  *bladder scan x2 with minimal amounts, voided ap. 125mL   *Picc placed today  *echo complete EF 60%   *son updated via phone

## 2023-03-26 PROBLEM — E87.20 LACTIC ACIDOSIS: Status: RESOLVED | Noted: 2023-01-01 | Resolved: 2023-01-01

## 2023-03-26 NOTE — CONSULTS
"                    Clinical Nutrition       Patient Name: Jeramie Ortiz  YOB: 1929  MRN: [id number]  Date of Encounter: 03/26/23 11:11 EDT  Admission date: 3/24/2023      Reason for Visit   Physician consult, Calorie Count      EMR Reviewed     EMR Reviewed: yes     Admission Diagnosis:  Sepsis, due to unspecified organism, unspecified whether acute organ dysfunction present (HCC) [A41.9]    Problem List:    Septic shock (HCC)    Hypothyroidism    Gout    Essential hypertension    Dementia (HCC)    CKD stage 3, GFR 30-59 ml/min (HCC)    Sepsis due to urinary tract infection (HCC)    Altered mental status (acute encephalopathy)    Atrial fibrillation (HCC)    Lactic acidosis      Anthropometric      Flowsheet Rows    Flowsheet Row First Filed Value   Admission Height 175.3 cm (69\") Documented at 03/24/2023 1304   Admission Weight 98.9 kg (218 lb) Documented at 03/24/2023 1304          Height: 175.3 cm (69\")  Last Filed Weight: Weight: 89 kg (196 lb 3.4 oz) (03/26/23 0522)  Weight Method: Bed scale  BMI: BMI (Calculated): 29  BMI classification: Overweight: 25.0-29.9kg/m2    IBW:  160 lbs    UBW: N/A  Weight change: weight loss x 3-4 months per son     Reported/Observed/Food/Nutrition Related - Comments   RN reports patient intermittent confused, flat affect, not wanting to participate in car, not wanting to eat or drink. RD called and spoke with son who reports patient has been having diarrhea with PO intake during the past 3-4 months. As a result he has had decreased PO intake during that time frame as he tries to prevent having BMs when he needs to participate in activities such as therapy. Nursing at [facility] has tried both supplemental fiber as well as imodium. The imodium works but results in constipation causing patient to go from one extreme to the other. Resulting unintentional weight loss. Son reports pt does like ONS - prefers strawberry and peach flavors.    RD called and spoke with RN at [facility] and " she reports patient has bouts of post-prandial diarrhea (intermittent). No wt hx available due to skilled nursing setting.       Current Nutrition Prescription     Diet: Regular/House Diet; Texture: Soft to Chew (NDD 3); Soft to Chew: Chopped Meat; Fluid Consistency: Thin (IDDSI 0)    Average Intake from Charting: Insufficient data     Nutrition Diagnosis     Problem No nutrition diagnosis at this time   Etiology    Signs/Symptoms    Status:    Actions     Follow treatment progress, Care plan reviewed, Encourage intake, Supplement provided    -Ordered peach Boost Breeze 2x daily. No strawberry ONS drinks in stock at this time.  -Calorie count starting today.      Monitor Per Protocol      Maya Murray, RD  Time Spent: 45 min

## 2023-03-26 NOTE — PROGRESS NOTES
"Intensivist Note     3/26/2023  Hospital Day: 2  * No surgery found *  ICU Stays Timeline            Hospital Admission: 03/24/23 1251 - Current  ICU stays: 1      In Date/Time Event Department ICU Stay Duration     03/24/23 1251 Admission [facility] EMERGENCY DEPT      03/24/23 2214 Transfer In [facility] 2A ICU 1 day 9 hours 18 minutes             Mr. Jeramie Ortiz, 89 y.o. male is followed for:    Septic shock (HCC)    Sepsis due to urinary tract infection (HCC)    Lactic acidosis    Altered mental status (acute encephalopathy)    Atrial fibrillation (HCC)    CKD stage 3, GFR 30-59 ml/min (HCC)    Hypothyroidism    Gout    Essential hypertension    Dementia (HCC)       SUBJECTIVE     89-year-old male who is a resident of [facility] in [place].  Records indicate PMH of hypertension, CKD, hypothyroidism, dementia, and gout.  Apparently became increasingly confused 3/24/2023 and was \"talking gibberish\".  Because of this he was transferred to [facility] ER.  He was unable to give a reliable history but denied dyspnea, chest pain, abdominal pain, headache, fever, chills, sweats, nausea, vomiting, or diarrhea.  At the time of arrival he was in atrial fibrillation with a heart rate of 112 and was hypotensive.  He had difficulty voiding but was able to give a small amount of urine with TNTC WBCs, 1+ bacteria, and positive for nitrites suggesting UTI (although cultures have never returned positive).  A Ambrose was attempted but nursing was unsuccessful.  CT scan and MRI was performed and was negative for any acute intracerebral events and chest x-ray revealed no infiltrates.  He was empirically given vancomycin and Zosyn, and when pressure remained low post fluid bolus, was begun on norepinephrine.  Although patient has been a documented DNR/DNI, ICU admission was requested due to the need for pressors.     Interval history: Uneventful evening and rested well.  Although confused answers me appropriately today.  Remains in " "atrial fibrillation with a controlled rate of 70 after being given digoxin yesterday (although not fully loaded).  Blood pressure is marginal at 98/61 and still requiring norepinephrine but this has been weaned significantly.  Remains on IV fluids at 100 cc an hour with persistently low UOP of 575 cc out over the last 24 hours.  This appears however to be picking up and does not have urinary retention and BUN/creatinine have improved slightly to 29/1.4.. Fluid balance is significantly positive in the last 24 hours (3.8 L positive) and has persistent anasarca as well as known chronic markedly elevated proBNP despite normal systolic LV function on echo.  No complaints of dyspnea and O2 sats are 100% on room air.  Denies cough, chest pain, purulent sputum production, hemoptysis, or pleurisy.  No nausea, vomiting, diarrhea.  Patient has a diet ordered although oral intake is suboptimal.  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  Patient remains afebrile with a normal WBC although procalcitonin remains elevated at 1.31 and he remains on empiric monotherapy with Zosyn.  MRSA PCR obtained today is pending       ROS: Per subjective, all other systems reviewed and were negative.    The patient's relevant PMH, PSH, FH, and SH were reviewed and updated in Epic as appropriate. Allergies and Medications reviewed.    OBJECTIVE     /67   Pulse 102   Temp 97.6 °F (36.4 °C) (Axillary)   Resp 14   Ht 175.3 cm (69\")   Wt 89 kg (196 lb 3.4 oz)   SpO2 (!) 80%   BMI 28.98 kg/m²           Flowsheet Rows    Flowsheet Row First Filed Value   Admission Height 175.3 cm (69\") Documented at 03/24/2023 1304   Admission Weight 98.9 kg (218 lb) Documented at 03/24/2023 1304        Intake & Output (last day)       03/25 0701 03/26 0700 03/26 0701 03/27 0700    P.O. 300     I.V. (mL/kg) 3765.2 (42.3)  "    IV Piggyback 326.1     Total Intake(mL/kg) 4391.3 (49.3)     Urine (mL/kg/hr) 575 (0.3)     Total Output 575     Net +3816.3                 Exam:  General Exam:  Elderly black male propped up in bed in NAD.  HEENT: Pupils equal and reactive. Nose and throat clear.  Neck:                          Supple, no JVD, thyromegaly, or adenopathy  Lungs: Clear to auscultation and percussion anteriorly and posteriorly.  Cardiovascular: Irregularly irregular rhythm with a variable S1 and normal S2.  No murmurs or gallops.  HR 70 bpm  Abdomen: Soft nontender without organomegaly or masses.   and rectal: Deferred.  Extremities: No cyanosis or clubbing but persistent anasarca  Neurologic:                 Symmetric strength but diffusely weak. No focal deficits.  Speech intact and although somewhat confused and disoriented to place      CXR 3/26/2023: Heart at the upper limits of normal.  Same eventration and elevation of the left hemidiaphragm.  Bibasilar hazy opacities consistent with some degree of atelectasis plus minus effusion.  I see no consolidation or air bronchograms.    INFUSIONS  norepinephrine, 0.02-0.3 mcg/kg/min, Last Rate: 0.16 mcg/kg/min (03/26/23 0601)  sodium chloride, 100 mL/hr, Last Rate: 100 mL/hr (03/25/23 2322)        Results from last 7 days   Lab Units 03/26/23  0402 03/25/23  0327 03/24/23  1302   WBC 10*3/mm3 8.57 10.97* 7.13   HEMOGLOBIN g/dL 8.6* 10.1* 9.2*   HEMATOCRIT % 29.4* 33.9* 32.0*   PLATELETS 10*3/mm3 207 311 255     Results from last 7 days   Lab Units 03/26/23  0402 03/25/23 0327   SODIUM mmol/L 140 135*   POTASSIUM mmol/L 4.0 4.6   CHLORIDE mmol/L 107 103   CO2 mmol/L 15.0* 17.0*   BUN mg/dL 29* 30*   CREATININE mg/dL 1.40* 1.48*   GLUCOSE mg/dL 112* 121*   CALCIUM mg/dL 8.6 8.8     Results from last 7 days   Lab Units 03/26/23  0402 03/25/23  0327 03/24/23  1302   MAGNESIUM mg/dL 1.8 2.0 1.8   PHOSPHORUS mg/dL 2.9 2.9  --      Results from last 7 days   Lab Units 03/26/23  0402  03/25/23  0327 03/24/23  1302   ALK PHOS U/L 1,111* 1,179* 1,197*   BILIRUBIN mg/dL 0.8 1.2 1.5*   ALT (SGPT) U/L 7 7 8   AST (SGOT) U/L 19 25 23       Lab Results   Component Value Date    SEDRATE 12 03/26/2023       Lab Results   Component Value Date    PROBNP 11,937.0 (H) 03/25/2023         Procalitonin Results:      Lab 03/26/23  0402 03/24/23  1302   PROCALCITONIN 1.31* 0.96*         Covid Tests    Common Labsle 3/24/23   COVID19 Not Detected            COVID LABS:  Results From Last 14 Days   Lab Units 03/26/23  0402 03/25/23  0327 03/24/23 2126 03/24/23  1627 03/24/23  1302   PROBNP pg/mL  --  11,937.0*  --   --   --    LACTATE mmol/L  --   --  2.0 3.6* 7.3*   LDH U/L 523*  --   --   --   --    PROCALCITONIN ng/mL 1.31*  --   --   --  0.96*   SED RATE mm/hr 12  --   --   --   --    HSTROP T ng/L  --   --   --   --  50*          Lab Results   Component Value Date    TROPONINT 50 (H) 03/24/2023     Lab Results   Component Value Date    TSH 2.230 03/24/2023     Lab Results   Component Value Date    LACTATE 2.0 03/24/2023     No results found for: CORTISOL        I reviewed the patient's results, images and medication.    Assessment & Plan   ASSESSMENT        Septic shock (HCC)    Sepsis due to urinary tract infection (HCC)    Lactic acidosis    Altered mental status (acute encephalopathy)    Atrial fibrillation (HCC)    CKD stage 3, GFR 30-59 ml/min (HCC)    Hypothyroidism    Gout    Essential hypertension    Dementia (HCC)      DISCUSSION: Enlarged heart but normal LV function and it appears that most of what we are seeing with anasarca is right heart failure.  Because of his urinalysis and elevated procalcitonin he is being treated as sepsis with antimicrobial therapy, fluids and pressors but with absence of fever, and WBC it is difficult to call this sepsis at this time (although still requiring pressors).  Need to consider adrenal insufficiency.  Renal function has improved with fluids and since his renal  function has improved slightly I hesitate to diurese.  Will however back off on fluids.  He does have a chronic right posterior tibial DVT but is not a candidate for anticoagulation and will just continue DVT prophylaxis.  All of this is problematic because of his DNR/DNI status at age 89 and the futility of aggressive evaluations for other causes.    PLAN     Cortrosyn stimulation test and will treat if has adrenal insufficiency  Midodrine  Decrease fluids to 50 cc an hour  Continue norepinephrine but try to wean  Encourage oral intake    Plan of care and goals reviewed with multidisciplinary team at daily rounds.    I discussed the patient's findings and my recommendations with patient and nursing staff    Patient is critically ill due to innumerable problems as noted above and has a high risk of life-threatening decline in condition.  They require continuous monitoring and frequent reassessment of condition for adjustment of management in order to lessen risk.  I visited the patient's bedside and interacted with nurse numerous times today.    Time spent Critical care 35 min (It does not include procedure time).    Electronically signed by Kingsley Anaya MD, 03/26/23, 7:32 AM EDT.   Pulmonary / Critical care medicine

## 2023-03-26 NOTE — PLAN OF CARE
Goal Outcome Evaluation:  Plan of Care Reviewed With: patient        Progress: no change  Outcome Evaluation: Remains d/o to situation. Afebrile. Levo weaned to 0.16. A.fib. No complaints of pain. Rested well throughout shift. Mag 1.8 this morning, replacement initiated per protocol.

## 2023-03-27 NOTE — PLAN OF CARE
Problem: Fall Injury Risk  Goal: Absence of Fall and Fall-Related Injury  Outcome: Ongoing, Progressing  Intervention: Identify and Manage Contributors  Recent Flowsheet Documentation  Taken 3/27/2023 0400 by Lizzie Verduzco, RN  Medication Review/Management: medications reviewed  Taken 3/27/2023 0200 by Lizzie Verduzco, RN  Medication Review/Management: medications reviewed  Taken 3/27/2023 0000 by Lizzie Verduzco, RN  Medication Review/Management: medications reviewed  Taken 3/26/2023 2200 by Lizzie Verduzco, RN  Medication Review/Management: medications reviewed  Taken 3/26/2023 2000 by Lizzie Verduzco, RN  Medication Review/Management: medications reviewed  Intervention: Promote Injury-Free Environment  Recent Flowsheet Documentation  Taken 3/27/2023 0400 by Lizzie Verduzco, RN  Safety Promotion/Fall Prevention:   activity supervised   clutter free environment maintained   fall prevention program maintained   nonskid shoes/slippers when out of bed   room organization consistent   safety round/check completed  Taken 3/27/2023 0200 by Lizzie Verduzco, RN  Safety Promotion/Fall Prevention:   activity supervised   clutter free environment maintained   fall prevention program maintained   nonskid shoes/slippers when out of bed   room organization consistent   safety round/check completed  Taken 3/27/2023 0000 by Lizzie Verduzco, RN  Safety Promotion/Fall Prevention:   activity supervised   clutter free environment maintained   fall prevention program maintained   nonskid shoes/slippers when out of bed   room organization consistent   safety round/check completed  Taken 3/26/2023 2200 by Lizzie Verduzco, RN  Safety Promotion/Fall Prevention:   activity supervised   clutter free environment maintained   fall prevention program maintained   nonskid shoes/slippers when out of bed   room organization consistent   safety round/check  completed  Taken 3/26/2023 2000 by Lizzie Verduzco, RN  Safety Promotion/Fall Prevention:   activity supervised   clutter free environment maintained   fall prevention program maintained   nonskid shoes/slippers when out of bed   room organization consistent   safety round/check completed   Goal Outcome Evaluation:  Plan of Care Reviewed With: patient        Progress: no change  Outcome Evaluation: Pt disoriented to situation, states that he was kidnapped and brought to the hospital against his will.  Pt frustrated with this nurse during patient care stating that he just wants to be left alone.  Pt afebrile this shift, levo titrated to keep MAP >65.  No complaints of pain.  T&R q 2 hours overnight.  Mepilex removed from coccyx and zgaurd applied.

## 2023-03-27 NOTE — CONSULTS
Clinical Nutrition       Patient Name: Jeramie Ortiz  YOB: 1929  MRN: [id number]  Date of Encounter: 23 18:01 EDT  Admission date: 3/24/2023      Reason for Visit   Physician consult, Calorie Count      EMR Reviewed     Admission Diagnosis:  Sepsis, due to unspecified organism, unspecified whether acute organ dysfunction present ([facility]) [A41.9]    Problem List:    Septic shock ([facility])    Hypothyroidism    Gout    Essential hypertension    Dementia ([facility])    CKD stage 3, GFR 30-59 ml/min ([facility])    Sepsis due to urinary tract infection.  No organism cultured ([facility])    Altered mental status improved    Atrial fibrillation with controlled rate ([facility])    GI: diarrhea    SKIN: coccyx contact dermatitis, possible stage 2 ulcer per WOC    Anthropometric      Height: 69in  Weight: 192lb bedscale  BMI: 28  BMI classification: Overweight: 25.0-29.9kg/m2      Weight change: weight loss x 3-4 months per son       Last 15 Recorded Weights  View Complete Flowsheet  Weight Weight (kg) Weight (lbs) Weight Method   3/27/2023 87.1 kg 192 lb 0.3 oz Bed scale   3/26/2023 88.905 kg 196 lb -   3/26/2023 89 kg 196 lb 3.4 oz Bed scale   3/25/2023 80.3 kg 177 lb 0.5 oz Bed scale   3/24/2023 77.5 kg 170 lb 13.7 oz Bed scale   3/24/2023 98.884 kg 218 lb Stated       Assessment 3-27-23------------------------------------------------------------------------------------      Kcal goal ~1800kcal  MSJ x 1.2: 1816kcal  20kcal per kkcal      Protein goal ~96g protein    1-1.2g protein per k-105g protein     Reported/Observed/Food/Nutrition Related - Comments     Per RN: pt has not been eating 2nd to diarrhea, has whole pills coming out in his stool, stool is liquid, mucous like    Cholestyramine ordered    Pt sitting up in chair, is blind and Elem,  observe he has not really touched dinner tray, pt reports he is not hungry    Son reports that pt usually only eats  breakfast and lunch, he does like boost  breeze     Current Nutrition Prescription     Diet: Regular/House Diet; Texture: Soft to Chew (NDD 3); Soft to Chew: Chopped Meat; Fluid Consistency: Thin (IDDSI 0)    Boost Breeze 2x/day    Intake: 8% of 3 meals    Calorie Count Day 1: 443kcal, 25% kcal needs, 25g protein, 26% protein needs    Nutrition Diagnosis     3-27-23  Problem Increased nutrient needs   Etiology Poor Skin Integrity   Signs/Symptoms SKIN: coccyx contact dermatitis, possible stage 2 ulcer per WOC       Actions     Follow treatment progress, Care plan reviewed, Interview for preferences, Menu provided, Menu adjusted, Adjusted supplement, Encourage intake    Increase Boost Breeze to 3x/day     Monitor Per Protocol      Rosario Mace RD  Time Spent: 45 min

## 2023-03-27 NOTE — NURSING NOTE
"Reason for Wound, Ostomy and Continence (WOC) Nursing Consultation: \"pressure injury to coccyx\"    Patient in ICU, had large liquid BM with fully formed pills present in stool.  Family/support person at bedside, supportive.      Wounds noted by WOC:  1.Wound Assessment  Wound Type: .irritant contact dermatitis due to incontinence from stool and/or urine, cannot rule out pressure component-stage 2 pressure injury, POA  Location:left coccyx; smaller area left gluteal blanches  Measurements: 0.5x0.5x0.05  Wound Bed: non-blanchable, moist, yellow and white-red islets, immediate surrounding / wound edges jose  Wound Edges: Open  Periwound Skin: blistered and pink   Drainage Characteristics/Odor: none  Drainage Amount: none  Pain: Yes - when wiping and touching area  Care provided: cleanse with ph balanced foam soap and blue wipes, thick layer of z guard  Notes: could benefit from FMS if diarrhea persists, can use stoma powder as base layer on open wound if z guard not sticking due to wound being too denuded  Wound Image:       Recommendation(s) for management of wound:   -Refer to wound care orders for specific instructions on how to treat/manage wound.  -Practice pressure injury prevention protocol.    Most recent David Scale score:  Sensory Perception: 2-->very limited  Moisture: 2-->very moist  Activity: 2-->chairfast  Mobility: 2-->very limited  Nutrition: 2-->probably inadequate  Friction and Shear: 1-->problem  David Score: 11 (03/27/23 0800)   Specialty support surface: Isolibrium   - may need specialty bed when moves to floor    Pressure Injury Prevention Protocol (initiate for David Score of 18 or less):   *Keep skin dry, turn q 2 hr, keep heels elevated and offloaded with offloading heel boots.    *Apply z-guard to bottom and bony prominences daily and as needed with incontinence episodes.  *Follow C.A.R.E protocol if medical devices (Bipap, gilmore, Ng tube, etc) are being used.     WOC Team will sign off.  " Please re consult if the wound(s) worsens.

## 2023-03-27 NOTE — CASE MANAGEMENT/SOCIAL WORK
Discharge Planning Assessment  [facility]     Patient Name: Jeramie Ortiz  MRN: [id number]  Today's Date: 3/27/2023    Admit Date: 3/24/2023    Plan: IPR vs Home ([facility])   Discharge Needs Assessment     Row Name 03/27/23 1157       Living Environment    People in Home facility resident;other (see comments)  Lives at [facility]    Current Living Arrangements assisted living facility    Primary Care Provided by other (see comments)  [facility] staff    Provides Primary Care For no one, unable/limited ability to care for self    Family Caregiver if Needed child(jay), adult    Family Caregiver Names Nelson Ortiz - son    Quality of Family Relationships helpful;involved;supportive    Able to Return to Prior Arrangements other (see comments)  TBD       Transition Planning    Patient/Family Anticipates Transition to home with help/services    Transportation Anticipated family or friend will provide       Discharge Needs Assessment    Readmission Within the Last 30 Days no previous admission in last 30 days    Equipment Currently Used at Home walker, rolling;wheelchair    Concerns to be Addressed discharge planning    Outpatient/Agency/Support Group Needs assisted living facility    Discharge Facility/Level of Care Needs assisted living facility    Current Discharge Risk chronically ill               Discharge Plan     Row Name 03/27/23 1203       Plan    Plan IPR vs Home ([facility])    Patient/Family in Agreement with Plan yes    Plan Comments I have met with Mr. Ortiz and his son, Nelson at the bedside today to initiate a discharge plan.  Mr. Ortiz is not conversive and drowsy.  Nelson states that his father currently resides at [facility] in [facility].  He states that his dad needs assistance with activities of daily living and requires a rolling walker to ambulate.  He also uses a wheelchair when outside the home.  Nelson denies current receipt of home health services and states his dad does  receive PT services in house at facility.  Mr. Ortiz is currently requiring Levophed drip and is being followed by the Pulmonary/Critical Care team for heart failure, sepsis and Chronic Kidney Disease.  CM will cont to follow the evolving plan of care and assist with discharge planning as recommnedations become available.    Final Discharge Disposition Code 30 - still a patient              Continued Care and Services - Admitted Since 3/24/2023    Coordination has not been started for this encounter.          Demographic Summary     Row Name 03/27/23 1147       General Information    Admission Type inpatient    Arrived From home    Referral Source admission list    Reason for Consult discharge planning    General Information Comments I have confirmed with 's son, Nelson that PCP is Kingsley Avalos MD and insurance is Medicare.       Contact Information    Permission Granted to Share Info With                Functional Status     Row Name 03/27/23 1151       Functional Status, IADL    Medications assistive person    Meal Preparation assistive person    Housekeeping assistive person    Laundry assistive person    Shopping completely dependent       Employment/    Employment Status retired               Psychosocial    No documentation.                Abuse/Neglect    No documentation.                Legal    No documentation.                Substance Abuse    No documentation.                Patient Forms    No documentation.                   Brittany Woodard, RN

## 2023-03-27 NOTE — PROGRESS NOTES
"Critical Care Note     LOS: 3 days   Patient Care Team:  Kingsley Downing MD as PCP - General (Family Medicine)  James Manzo MD as Consulting Physician (Cardiology)    Chief Complaint/Reason for visit:    Chief Complaint   Patient presents with   • Altered Mental Status      Septic shock (HCC)    Sepsis due to urinary tract infection (HCC)    Lactic acidosis    Altered mental status (acute encephalopathy)    Atrial fibrillation (HCC)    CKD stage 3, GFR 30-59 ml/min (HCC)    Hypothyroidism    Gout    Essential hypertension    Dementia (HCC)    Subjective     Interval History:     Patient is afebrile.  Urine output 1100+ unmeasured.  He remains on norepinephrine 0.08 mics per kilogram per minute.  Room air saturation is 97%.  He complains of diarrhea after eating and so he is not wanting to take p.o.    Review of Systems:    All systems were reviewed and negative except as noted in subjective.    Medical history, surgical history, social history, family history reviewed    Objective     Intake/Output:    Intake/Output Summary (Last 24 hours) at 3/27/2023 1821  Last data filed at 3/27/2023 1603  Gross per 24 hour   Intake 3856 ml   Output 900 ml   Net 2956 ml       Nutrition:  Diet: Regular/House Diet; Texture: Soft to Chew (NDD 3); Soft to Chew: Chopped Meat; Fluid Consistency: Thin (IDDSI 0)    Infusions:  norepinephrine, 0.02-0.3 mcg/kg/min, Last Rate: 0.12 mcg/kg/min (03/27/23 1700)  sodium chloride, 50 mL/hr, Last Rate: 50 mL/hr (03/27/23 1405)        Mechanical Ventilator Settings:                                                Telemetry:  atrial fibrillation             Vital Signs  Blood pressure 122/73, pulse 71, temperature 97.8 °F (36.6 °C), temperature source Axillary, resp. rate 22, height 175.3 cm (69\"), weight 87.1 kg (192 lb 0.3 oz), SpO2 100 %.    Physical Exam:  General Appearance:   Cachectic older gentleman in no distress   Head:   Temporal wasting   Eyes:          No jaundice   Ears:   "   Throat:  No exudate   Neck:  Trachea midline, no palpable thyroid   Back:      Lungs:    Symmetric chest expansion.  Breath sounds are bilateral without wheeze or rhonchi    Heart:   Irregular rhythm, S1, S2 auscultated   Abdomen:    Nondistended, bowel sounds present, soft   Rectal:   Deferred   Extremities:  No edema, general decreased musculature   Pulses:    Skin:  Warm and dry without rash   Lymph nodes:  No cervical adenopathy   Neurologic:  He will arouse and tell me his name but is lethargic      Results Review:     I reviewed the patient's new clinical results.   Results from last 7 days   Lab Units 03/26/23  0402 03/25/23 0327 03/24/23  1302   SODIUM mmol/L 140 135* 139   POTASSIUM mmol/L 4.0 4.6 4.4   CHLORIDE mmol/L 107 103 103   CO2 mmol/L 15.0* 17.0* 15.0*   BUN mg/dL 29* 30* 28*   CREATININE mg/dL 1.40* 1.48* 1.72*   CALCIUM mg/dL 8.6 8.8 9.0   BILIRUBIN mg/dL 0.8 1.2 1.5*   ALK PHOS U/L 1,111* 1,179* 1,197*   ALT (SGPT) U/L 7 7 8   AST (SGOT) U/L 19 25 23   GLUCOSE mg/dL 112* 121* 94     Results from last 7 days   Lab Units 03/26/23  0402 03/25/23 0327 03/24/23  1302   WBC 10*3/mm3 8.57 10.97* 7.13   HEMOGLOBIN g/dL 8.6* 10.1* 9.2*   HEMATOCRIT % 29.4* 33.9* 32.0*   PLATELETS 10*3/mm3 207 311 255   Cortisol stimulation test, baseline cortisol 11.4, increased to 20 at 30 minutes and 22 at 60 minutes      Lab Results   Component Value Date    BLOODCX No growth at 3 days 03/24/2023     Lab Results   Component Value Date    URINECX No growth 03/24/2023       I reviewed the patient's new imaging including images and reports.    Narrative:     •  Chronic right lower extremity deep vein thrombosis noted in the   posterior tibial.   •  Normal left lower extremity venous duplex scan.            All medications reviewed.   cholestyramine light, 1 packet, Oral, BID With Meals  digoxin, 125 mcg, Oral, Daily  folic acid, 1 mg, Oral, Daily  heparin (porcine), 5,000 Units, Subcutaneous, Q8H  levothyroxine, 50  "mcg, Oral, Daily  midodrine, 5 mg, Oral, TID AC  pantoprazole, 40 mg, Oral, Nightly  piperacillin-tazobactam, 3.375 g, Intravenous, Q8H  sodium chloride, 10 mL, Intravenous, Q12H          Assessment & Plan       Septic shock (HCC)    Hypothyroidism    Gout    Essential hypertension    Dementia (HCC)    CKD stage 3, GFR 30-59 ml/min ([facility])    Sepsis due to urinary tract infection.  No organism cultured ([facility])    Altered mental status improved    Atrial fibrillation with controlled rate ([facility])    89-year-old male who is a resident of [facility] in [place].  Records indicate PMH of hypertension, CKD, hypothyroidism, dementia, and gout.  Apparently became increasingly confused 3/24/2023 and was \"talking gibberish\".  Because of this he was transferred to [facility].  He was unable to give a reliable history but denied dyspnea, chest pain, abdominal pain, headache, fever, chills, sweats, nausea, vomiting, or diarrhea.  At the time of arrival he was in atrial fibrillation with a heart rate of 112 and was hypotensive.  He had difficulty voiding but was able to give a small amount of urine with TNTC WBCs, 1+ bacteria, and positive for nitrites suggesting UTI (although cultures have never returned positive).  A Ambrose was attempted but nursing was unsuccessful.  CT scan and MRI was performed and was negative for any acute intracerebral events and chest x-ray revealed no infiltrates.  He was empirically given vancomycin and Zosyn, and when pressure remained low post fluid bolus, was begun on norepinephrine.  Although patient has been a documented DNR/DNI, ICU admission was requested due to the need for pressors.    He continues to require norepinephrine.  Creatinine is slightly improved at 1.4, compared to 1.72.  Temperature has defervesced.  Blood cultures remain negative as well as urine culture.  Cortrosyn stimulation test revealed no adrenal insufficiency.  He remains in atrial fibrillation with a controlled " rate.  Oral intake is poor.  He remains no CPR, no intubation.      PLAN:    Continue norepinephrine for blood pressure support  Monitor urine output and  Follow BUN and creatinine  Trial of cholestyramine before meals  Continue Zosyn  Start midodrine  Thyroid replacement digoxin for atrial fibrillation  Gentle hydration  Follow-up cultures      VTE Prophylaxis: subcutaneous heparin    Stress Ulcer Prophylaxis: none    Flora Solorio MD  03/27/23  18:21 EDT      Time: Critical care 30 min  I personally provided care to this critically ill patient as documented above.  Critical care time does not include time spent on separately billed procedures.  None of my critical care time was concurrent with other critical care providers.

## 2023-03-28 NOTE — PLAN OF CARE
Goal Outcome Evaluation:           Progress: improving  -VSS on ra  -levo continues at 0.07  -up to chair; pt prefers chair  -1 u PRBCs ordered, type & screen needed; pt refused lab draw so order to use PICC to draw obtained. Delay in blood bank but unit requested at this time  -pt's son updated at bedside  -PT/OT orders placed

## 2023-03-28 NOTE — CONSULTS
Clinical Nutrition       Patient Name: Jeramie Ortiz  YOB: 1929  MRN: [id number]  Date of Encounter: 23 17:42 EDT  Admission date: 3/24/2023      Reason for Visit   MDR, Follow-up protocol, Calorie Count      EMR Reviewed     Admission Diagnosis:  Sepsis, due to unspecified organism, unspecified whether acute organ dysfunction present (HCC) [A41.9]    Problem List:    Septic shock (HCC)    Hypothyroidism    Gout    Essential hypertension    Dementia (HCC)    CKD stage 3, GFR 30-59 ml/min (HCC)    Sepsis due to urinary tract infection.  No organism cultured ([facility])    Altered mental status improved    Atrial fibrillation with controlled rate ([facility])    GI: abdomen soft, nontender, no diarrhea today, last bm 3-27    SKIN: coccyx contact dermatitis, possible stage 2 ulcer per WOC    Anthropometric      Height: 69in  Weight: 192lb bedscale  BMI: 28  BMI classification: Overweight: 25.0-29.9kg/m2      Weight change: weight loss x 3-4 months per son       Last 15 Recorded Weights  View Complete Flowsheet  Weight Weight (kg) Weight (lbs) Weight Method   3/27/2023 87.1 kg 192 lb 0.3 oz Bed scale   3/26/2023 88.905 kg 196 lb -   3/26/2023 89 kg 196 lb 3.4 oz Bed scale   3/25/2023 80.3 kg 177 lb 0.5 oz Bed scale   3/24/2023 77.5 kg 170 lb 13.7 oz Bed scale   3/24/2023 98.884 kg 218 lb Stated       Assessment 3-27-23------------------------------------------------------------------------------------      Kcal goal ~1800kcal  MSJ x 1.2: 1816kcal  20kcal per kkcal      Protein goal ~96g protein    1-1.2g protein per k-105g protein     Reported/Observed/Food/Nutrition Related - Comments     Per RN: pt drank all of boost breeze, ate some breakfast, has not had any diarrhea today    Pt sitting up in chair, in good spirits,  reports he is no worse than yesterday, son present     Current Nutrition Prescription     Diet: Regular/House Diet; Texture: Soft to Chew (NDD 3); Soft to Chew:  Chopped Meat; Fluid Consistency: Thin (IDDSI 0)    Boost Breeze 3x/day    Intake:  Calorie Count Day 1: 443kcal, 25% kcal needs, 25g protein, 26% protein needs  Calorie Count Day 2: 666kcal, 37% kcal needs, 25g protein, 26% protein needs    Note po intake improved significantly today, has eaten 50% of last 2 meals    Nutrition Diagnosis     3-27-23, 3-26-23  Problem Increased nutrient needs   Etiology Poor Skin Integrity   Signs/Symptoms SKIN: coccyx contact dermatitis, possible stage 2 ulcer per WOC       Actions     Follow treatment progress, Care plan reviewed, Advised available snacks, Interview for preferences, Menu provided, Menu adjusted, Adjusted supplement, Encourage intake     Will send Magic Cup at lunch to increase oral intake    Monitor Per Protocol      Rosario Mace RD  Time Spent: 30 min

## 2023-03-28 NOTE — PLAN OF CARE
Problem: Fall Injury Risk  Goal: Absence of Fall and Fall-Related Injury  Outcome: Ongoing, Progressing  Intervention: Identify and Manage Contributors  Recent Flowsheet Documentation  Taken 3/28/2023 0400 by Lizzie Verduzco, RN  Medication Review/Management: medications reviewed  Taken 3/28/2023 0200 by Lizzie Verduzco, RN  Medication Review/Management: medications reviewed  Taken 3/28/2023 0000 by Lizzie Verduzco, RN  Medication Review/Management: medications reviewed  Taken 3/27/2023 2200 by Lizzie Verduzco, RN  Medication Review/Management: medications reviewed  Taken 3/27/2023 2000 by Lizzie Verduzco, RN  Medication Review/Management: medications reviewed  Intervention: Promote Injury-Free Environment  Recent Flowsheet Documentation  Taken 3/28/2023 0400 by Lizzie Verduzco, RN  Safety Promotion/Fall Prevention:  • activity supervised  • clutter free environment maintained  • fall prevention program maintained  • nonskid shoes/slippers when out of bed  • room organization consistent  • safety round/check completed  Taken 3/28/2023 0200 by Lizzie Verduzco, RN  Safety Promotion/Fall Prevention:  • activity supervised  • clutter free environment maintained  • fall prevention program maintained  • nonskid shoes/slippers when out of bed  • room organization consistent  • safety round/check completed  Taken 3/28/2023 0000 by Lizzie Verduzco, RN  Safety Promotion/Fall Prevention:  • activity supervised  • clutter free environment maintained  • fall prevention program maintained  • nonskid shoes/slippers when out of bed  • room organization consistent  • safety round/check completed  Taken 3/27/2023 2200 by Lizzie Verduzco, RN  Safety Promotion/Fall Prevention:  • activity supervised  • clutter free environment maintained  • fall prevention program maintained  • nonskid shoes/slippers when out of bed  • room organization consistent  •  safety round/check completed  Taken 3/27/2023 2000 by Lizzie Verduzco, RN  Safety Promotion/Fall Prevention:  • activity supervised  • clutter free environment maintained  • fall prevention program maintained  • nonskid shoes/slippers when out of bed  • room organization consistent  • safety round/check completed   Goal Outcome Evaluation:  Plan of Care Reviewed With: patient        Progress: improving  Outcome Evaluation: Pt remains oriented to situation.  LEE/FC, pleasant overnight and did not refuse care.  Levo titrated down to 0.08 overnight.  Afebrile.  No complaints of pain.  T&R q 2 hours.  Electrolytes replaced per protocol.

## 2023-03-28 NOTE — PROGRESS NOTES
"Critical Care Note     LOS: 4 days   Patient Care Team:  Kingsley Downing MD as PCP - General (Family Medicine)  James Manzo MD as Consulting Physician (Cardiology)    Chief Complaint/Reason for visit:    Chief Complaint   Patient presents with   • Altered Mental Status      Septic shock (HCC)    Sepsis due to urinary tract infection (HCC)    Lactic acidosis    Altered mental status (acute encephalopathy)    Atrial fibrillation (HCC)    CKD stage 3, GFR 30-59 ml/min (HCC)    Hypothyroidism    Gout    Essential hypertension    Dementia (HCC)    Subjective     Interval History:     He is in atrial fibrillation with a slow rate of 50 to 60 bpm.  Norepinephrine is increased at 0.09 mics per kilogram per minute.  He continues to show third space swelling.  He is tolerating a p.o. diet.    Review of Systems:    All systems were reviewed and negative except as noted in subjective.    Medical history, surgical history, social history, family history reviewed    Objective     Intake/Output:    Intake/Output Summary (Last 24 hours) at 3/28/2023 1420  Last data filed at 3/28/2023 1200  Gross per 24 hour   Intake 2587.3 ml   Output 500 ml   Net 2087.3 ml       Nutrition:  Diet: Regular/House Diet; Texture: Soft to Chew (NDD 3); Soft to Chew: Chopped Meat; Fluid Consistency: Thin (IDDSI 0)    Infusions:  norepinephrine, 0.02-0.3 mcg/kg/min, Last Rate: 0.09 mcg/kg/min (03/28/23 1027)  sodium chloride, 50 mL/hr, Last Rate: 50 mL/hr (03/28/23 0452)        Mechanical Ventilator Settings:                                                Telemetry:  atrial fibrillation             Vital Signs  Blood pressure 91/50, pulse 61, temperature 97.5 °F (36.4 °C), temperature source Axillary, resp. rate 20, height 175.3 cm (69\"), weight 90.4 kg (199 lb 4.7 oz), SpO2 (!) 82 %.    Physical Exam:  General Appearance:   Cachectic older gentleman in no distress   Head:   Temporal wasting   Eyes:          No jaundice   Ears:     Throat:  " No exudate   Neck:  Trachea midline, no palpable thyroid   Back:      Lungs:    Symmetric chest expansion.  Breath sounds are bilateral without wheeze or rhonchi    Heart:   Irregular rhythm, slow rate, S1, S2 auscultated   Abdomen:    Nondistended, bowel sounds present, soft   Rectal:   Deferred   Extremities:  Ankle edema, general decreased musculature   Pulses:    Skin:  Warm and dry without rash   Lymph nodes:  No cervical adenopathy   Neurologic:  He is more alert today, follows commands, answer simple questions, still with generalized weakness      Results Review:     I reviewed the patient's new clinical results.   Results from last 7 days   Lab Units 03/28/23  0441 03/26/23  0402 03/25/23  0327 03/24/23  1302   SODIUM mmol/L 143 140 135* 139   POTASSIUM mmol/L 3.1* 4.0 4.6 4.4   CHLORIDE mmol/L 111* 107 103 103   CO2 mmol/L 20.0* 15.0* 17.0* 15.0*   BUN mg/dL 19 29* 30* 28*   CREATININE mg/dL 0.88 1.40* 1.48* 1.72*   CALCIUM mg/dL 8.2 8.6 8.8 9.0   BILIRUBIN mg/dL  --  0.8 1.2 1.5*   ALK PHOS U/L  --  1,111* 1,179* 1,197*   ALT (SGPT) U/L  --  7 7 8   AST (SGOT) U/L  --  19 25 23   GLUCOSE mg/dL 124* 112* 121* 94     Results from last 7 days   Lab Units 03/28/23  0441 03/26/23 0402 03/25/23  0327   WBC 10*3/mm3 5.72 8.57 10.97*   HEMOGLOBIN g/dL 7.8* 8.6* 10.1*   HEMATOCRIT % 26.7* 29.4* 33.9*   PLATELETS 10*3/mm3 162 207 311   Cortisol stimulation test, baseline cortisol 11.4, increased to 20 at 30 minutes and 22 at 60 minutes      Lab Results   Component Value Date    BLOODCX No growth at 3 days 03/24/2023     Lab Results   Component Value Date    URINECX No growth 03/24/2023       I reviewed the patient's new imaging including images and reports.    Narrative:     •  Chronic right lower extremity deep vein thrombosis noted in the   posterior tibial.   •  Normal left lower extremity venous duplex scan.            All medications reviewed.   cholestyramine light, 1 packet, Oral, BID With Meals  Diclofenac  "Sodium, 2 g, Topical, 4x Daily  digoxin, 125 mcg, Oral, Daily  folic acid, 1 mg, Oral, Daily  furosemide, 80 mg, Intravenous, Once  heparin (porcine), 5,000 Units, Subcutaneous, Q8H  levothyroxine, 50 mcg, Oral, Daily  midodrine, 5 mg, Oral, TID AC  pantoprazole, 40 mg, Oral, Nightly  piperacillin-tazobactam, 3.375 g, Intravenous, Q8H  potassium chloride, 40 mEq, Oral, Q4H  sodium chloride, 10 mL, Intravenous, Q12H          Assessment & Plan       Septic shock ([facility])    Hypothyroidism    Gout    Essential hypertension    Dementia ([facility])    CKD stage 3, GFR 30-59 ml/min ([facility])    Sepsis due to urinary tract infection.  No organism cultured ([facility])    Altered mental status improved    Atrial fibrillation with controlled rate ([facility])    89-year-old male who is a resident of [facility] in [place].  Records indicate PMH of hypertension, CKD, hypothyroidism, dementia, and gout.  Apparently became increasingly confused 3/24/2023 and was \"talking gibberish\".  Because of this he was transferred to [facility] ER.  He was unable to give a reliable history but denied dyspnea, chest pain, abdominal pain, headache, fever, chills, sweats, nausea, vomiting, or diarrhea.  At the time of arrival he was in atrial fibrillation with a heart rate of 112 and was hypotensive.  He had difficulty voiding but was able to give a small amount of urine with TNTC WBCs, 1+ bacteria, and positive for nitrites suggesting UTI (although cultures have never returned positive).  A Ambrose was attempted but nursing was unsuccessful.  CT scan and MRI was performed and was negative for any acute intracerebral events and chest x-ray revealed no infiltrates.  He was empirically given vancomycin and Zosyn, and when pressure remained low post fluid bolus, was begun on norepinephrine.  Although patient has been a documented DNR/DNI, ICU admission was requested due to the need for pressors.  Blood and urine cultures are negative.  Respiratory PCR panel is " negative.  Urinalysis however is suspicious for infection.  ACTH stimulation test was negative for adrenal insufficiency.    He has been more alert over the last 2 days and remains afebrile.  Renal function has normalized, creatinine is 0.88 today, compared to 1.72 on admission.  Phosphorus and magnesium remain depleted as well as potassium and he is receiving replacement.  He continues to require norepinephrine for blood pressure support.  At baseline he takes medications for hypertension.  He is significantly underweight.  He complains of diarrhea after he eats.  Cholestyramine was given yesterday after he had 4 stools.  He has developed some third spaced edema.  He is a net +13.8 L and weight is up 20 pounds compared to admission.    Admission TSH was normal at 2.23, free T4 was 1.16 on the home dose of levothyroxine 50 mcg daily.      PLAN:    Continue norepinephrine for blood pressure support  Continue Zosyn for suspected urinary tract infection  Transfuse 1 unit of packed cells and follow with Lasix  Replace electrolytes  Cholestyramine before meals  Stop IV fluids  Continue thyroid replacement  Hold digoxin because of bradycardia  Mobilize to the chair      VTE Prophylaxis: subcutaneous heparin    Stress Ulcer Prophylaxis: none    Flora Solorio MD  03/28/23  14:20 EDT      Time: Critical care 30 min  I personally provided care to this critically ill patient as documented above.  Critical care time does not include time spent on separately billed procedures.  None of my critical care time was concurrent with other critical care providers.

## 2023-03-29 NOTE — PROGRESS NOTES
"Critical Care Note     LOS: 5 days   Patient Care Team:  Kingsley Downing MD as PCP - General (Family Medicine)  James Manzo MD as Consulting Physician (Cardiology)    Chief Complaint/Reason for visit:    Chief Complaint   Patient presents with   • Altered Mental Status      Septic shock (HCC)    Sepsis due to urinary tract infection (HCC)    Lactic acidosis    Altered mental status (acute encephalopathy)    Atrial fibrillation (HCC)    CKD stage 3, GFR 30-59 ml/min (HCC)    Hypothyroidism    Gout    Essential hypertension    Dementia (HCC)    Subjective     Interval History:     He had a low blood pressure yesterday and has some third spaced edema.  He remains on norepinephrine 0.03 mics per kilogram per minute.  He did receive 1 unit of packed cells yesterday followed by some Lasix with good diuresis.  After the blood products norepinephrine was able to wean some.  He was sleeping this morning when I rounded but nursing reports he has been oriented.      Review of Systems:    All systems were reviewed and negative except as noted in subjective.    Medical history, surgical history, social history, family history reviewed    Objective     Intake/Output:    Intake/Output Summary (Last 24 hours) at 3/29/2023 1553  Last data filed at 3/29/2023 1200  Gross per 24 hour   Intake 1343.7 ml   Output --   Net 1343.7 ml       Nutrition:  Diet: Regular/House Diet; Texture: Soft to Chew (NDD 3); Soft to Chew: Chopped Meat; Fluid Consistency: Thin (IDDSI 0)    Infusions:  norepinephrine, 0.02-0.3 mcg/kg/min, Last Rate: 0.02 mcg/kg/min (03/29/23 1300)        Mechanical Ventilator Settings:                                                Telemetry:  atrial fibrillation             Vital Signs  Blood pressure 97/48, pulse 52, temperature 96.7 °F (35.9 °C), temperature source Axillary, resp. rate 20, height 175.3 cm (69\"), weight 90.4 kg (199 lb 4.7 oz), SpO2 93 %.    Physical Exam:  General Appearance:   Cachectic older " gentleman in no distress, sleeping   Head:   Temporal wasting   Eyes:          No jaundice   Ears:     Throat:  No exudate   Neck:  Trachea midline, no palpable thyroid   Back:      Lungs:    Symmetric chest expansion.  Breath sounds are bilateral, shallow without wheeze or rhonchi    Heart:   Irregular rhythm, slow rate, S1, S2 auscultated   Abdomen:    Nondistended, bowel sounds present, soft   Rectal:   Deferred   Extremities:  Ankle edema, general decreased musculature   Pulses:    Skin:  Warm and dry without rash   Lymph nodes:  No cervical adenopathy   Neurologic:  Sleeping      Results Review:     I reviewed the patient's new clinical results.   Results from last 7 days   Lab Units 03/29/23 0428 03/28/23  1832 03/28/23  0441 03/26/23  0402 03/25/23  0327   SODIUM mmol/L 142  --  143 140 135*   POTASSIUM mmol/L 4.1 5.0 3.1* 4.0 4.6   CHLORIDE mmol/L 112*  --  111* 107 103   CO2 mmol/L 19.0*  --  20.0* 15.0* 17.0*   BUN mg/dL 15  --  19 29* 30*   CREATININE mg/dL 0.75*  --  0.88 1.40* 1.48*   CALCIUM mg/dL 8.1*  --  8.2 8.6 8.8   BILIRUBIN mg/dL 1.2  --   --  0.8 1.2   ALK PHOS U/L 750*  --   --  1,111* 1,179*   ALT (SGPT) U/L 5  --   --  7 7   AST (SGOT) U/L 12  --   --  19 25   GLUCOSE mg/dL 97  --  124* 112* 121*     Results from last 7 days   Lab Units 03/29/23 0428 03/28/23 0441 03/26/23  0402   WBC 10*3/mm3 5.94 5.72 8.57   HEMOGLOBIN g/dL 8.8* 7.8* 8.6*   HEMATOCRIT % 28.5* 26.7* 29.4*   PLATELETS 10*3/mm3 139* 162 207   Cortisol stimulation test, baseline cortisol 11.4, increased to 20 at 30 minutes and 22 at 60 minutes      Lab Results   Component Value Date    BLOODCX No growth at 4 days 03/24/2023     Lab Results   Component Value Date    URINECX No growth 03/24/2023       I reviewed the patient's new imaging including images and reports.    Narrative:     •  Chronic right lower extremity deep vein thrombosis noted in the   posterior tibial.   •  Normal left lower extremity venous duplex scan.   "          All medications reviewed.   cholestyramine light, 1 packet, Oral, BID With Meals  Diclofenac Sodium, 2 g, Topical, 4x Daily  folic acid, 1 mg, Oral, Daily  heparin (porcine), 5,000 Units, Subcutaneous, Q8H  levothyroxine, 50 mcg, Oral, Daily  midodrine, 5 mg, Oral, TID AC  pantoprazole, 40 mg, Oral, Nightly  piperacillin-tazobactam, 3.375 g, Intravenous, Q8H  sodium chloride, 10 mL, Intravenous, Q12H          Assessment & Plan       Septic shock (HCC)    Hypothyroidism    Gout    Essential hypertension    Dementia ([facility])    CKD stage 3, GFR 30-59 ml/min ([facility])    Sepsis due to urinary tract infection.  No organism cultured ([facility])    Altered mental status improved    Atrial fibrillation with controlled rate ([facility])    89-year-old male who is a resident of [facility] in [place].  Records indicate PMH of hypertension, CKD, hypothyroidism, dementia, and gout.  Apparently became increasingly confused 3/24/2023 and was \"talking gibberish\".  Because of this he was transferred to [facility] ER.  He was unable to give a reliable history but denied dyspnea, chest pain, abdominal pain, headache, fever, chills, sweats, nausea, vomiting, or diarrhea.  At the time of arrival he was in atrial fibrillation with a heart rate of 112 and was hypotensive.  He had difficulty voiding but was able to give a small amount of urine with TNTC WBCs, 1+ bacteria, and positive for nitrites suggesting UTI (although cultures have never returned positive).  A Ambrose was attempted but nursing was unsuccessful.  CT scan and MRI was performed and was negative for any acute intracerebral events and chest x-ray revealed no infiltrates.  He was empirically given vancomycin and Zosyn, and when pressure remained low post fluid bolus, was begun on norepinephrine.  Although patient has been a documented DNR/DNI, ICU admission was requested due to the need for pressors.  Blood and urine cultures are negative.  Respiratory PCR panel is " negative.  Urinalysis however is suspicious for infection.  ACTH stimulation test was negative for adrenal insufficiency.    Renal function has normalized and he has developed some third spaced edema.  He did tolerate diuresis yesterday after giving blood products.  He has a chronic anemia and had drifted down to a hemoglobin of 7.8 compared to 10.  There is no evidence of active blood loss.  Hemoglobin did improve to 8.8 posttransfusion.  Norepinephrine drip was weaned from 0.09 mics per kilogram per minute to 0.03.  He does remain a net +14.7 L.    He has a history of chronic diarrhea.  He does not like to eat because he will have diarrhea immediately after eating.  I initiated some cholestyramine before meals twice daily yesterday and diarrhea is significantly better.      Admission TSH was normal at 2.23, free T4 was 1.16 on the home dose of levothyroxine 50 mcg daily.      PLAN:    Continue norepinephrine for blood pressure support  Continue Zosyn for suspected urinary tract infection  Give albumin followed by Lasix to mobilize third spaced edema  Continue cholestyramine twice daily before meals for diarrhea, chronic  Holding digoxin for bradycardia  Continue thyroid replacement  Mobilize to the chair  Encourage p.o. intake  Continue midodrine at current dose    VTE Prophylaxis: subcutaneous heparin    Stress Ulcer Prophylaxis: none    Flora Solorio MD  03/29/23  15:53 EDT      Time: 25 minutes

## 2023-03-29 NOTE — THERAPY EVALUATION
Patient Name: Jeramie Ortiz  : 12/15/1929    MRN: [id number]                              Today's Date: 3/29/2023       Admit Date: 3/24/2023    Visit Dx:     ICD-10-CM ICD-9-CM   1. Sepsis, due to unspecified organism, unspecified whether acute organ dysfunction present (HCC)  A41.9 038.9     995.91   2. New onset atrial fibrillation (HCC)  I48.91 427.31     Patient Active Problem List   Diagnosis   • Hypothyroidism   • Gout   • Essential hypertension   • Dementia (HCC)   • CKD stage 3, GFR 30-59 ml/min (HCC)   • Septic shock (HCC)   • Sepsis due to urinary tract infection.  No organism cultured (HCC)   • Altered mental status improved   • Atrial fibrillation with controlled rate (HCC)     Past Medical History:   Diagnosis Date   • CKD (chronic kidney disease) stage 3, GFR 30-59 ml/min ([facility]) 3/24/2023   • Dementia ([facility]) 3/24/2023   • Essential hypertension 3/24/2023   • Gout 3/24/2023   • Hypothyroidism 3/24/2023     Past Surgical History:   Procedure Laterality Date   • CHOLECYSTECTOMY     • PROSTATE SURGERY     • REPLACEMENT TOTAL KNEE BILATERAL        General Information     Row Name 23 1147          Physical Therapy Time and Intention    Document Type evaluation (P)   -HT     Mode of Treatment physical therapy (P)   -HT     Row Name 23 1147          General Information    Patient Profile Reviewed yes (P)   -HT     Prior Level of Function mod assist:;all household mobility;community mobility;gait;transfer;bed mobility;w/c or scooter (P)   lives in [facility] needs some assistance with ADLs. uses RW for household ambulation and w/c for community distances. per CM note, pt unreliable historian.  -HT     Existing Precautions/Restrictions fall (P)   -HT     Barriers to Rehab medically complex;previous functional deficit;cognitive status (P)   -HT     Row Name 23 1147          Living Environment    People in Home facility resident;other (see comments) (P)   [facility] [place]  -HT     Row Name  03/29/23 1147          Home Main Entrance    Number of Stairs, Main Entrance none (P)   -HT     Row Name 03/29/23 1147          Stairs Within Home, Primary    Number of Stairs, Within Home, Primary none (P)   -HT     Row Name 03/29/23 1147          Cognition    Orientation Status (Cognition) oriented x 3 (P)   -HT     Row Name 03/29/23 1147          Safety Issues, Functional Mobility    Safety Issues Affecting Function (Mobility) awareness of need for assistance;insight into deficits/self-awareness;judgment;problem-solving;safety precaution awareness;safety precautions follow-through/compliance;sequencing abilities (P)   -HT     Impairments Affecting Function (Mobility) balance;cognition;endurance/activity tolerance;shortness of breath;strength (P)   -HT     Cognitive Impairments, Mobility Safety/Performance awareness, need for assistance;insight into deficits/self-awareness;safety precaution awareness;safety precaution follow-through (P)   -HT           User Key  (r) = Recorded By, (t) = Taken By, (c) = Cosigned By    Initials Name Provider Type    HT Cheyenne Trejo, PT Student PT Student               Mobility     Row Name 03/29/23 1151          Bed Mobility    Bed Mobility supine-sit (P)   -HT     Supine-Sit [place] (Bed Mobility) maximum assist (25% patient effort);2 person assist (P)   -HT     Assistive Device (Bed Mobility) bed rails;draw sheet;head of bed elevated (P)   -HT     Comment, (Bed Mobility) pt attempted to follow cues but needed assistance with trunk/LEs. (P)   -HT     Row Name 03/29/23 1151          Sit-Stand Transfer    Sit-Stand [place] (Transfers) 2 person assist;moderate assist (50% patient effort) (P)   -HT     Assistive Device (Sit-Stand Transfers) walker, front-wheeled (P)   -HT     Comment, (Sit-Stand Transfer) 2x STS, 1x from bed and 1x from chair. cues required for hand placement. (P)   -HT     Row Name 03/29/23 1151          Gait/Stairs (Locomotion)    [place] Level  (Gait) minimum assist (75% patient effort);1 person assist;verbal cues (P)   -HT     Assistive Device (Gait) walker, front-wheeled (P)   -HT     Distance in Feet (Gait) 2+6 (P)   -HT     Deviations/Abnormal Patterns (Gait) bilateral deviations;base of support, narrow;adrian decreased;stride length decreased;weight shifting decreased;festinating/shuffling (P)   -HT     Bilateral Gait Deviations heel strike decreased;forward flexed posture (P)   -HT     Comment, (Gait/Stairs) pt demonstrated slight step through gait pattern resembling shuffling showing decreased adrian, fwd trunk, and decreased weight shifting B requiring assistance to initiate weight shift and cues required for increased stride length and upright posture. Pt ambulated until incontinence occured requiring a chair being brought up. Gait limited by decreased endurance, balance deficits, and generalized weakness. (P)   -HT           User Key  (r) = Recorded By, (t) = Taken By, (c) = Cosigned By    Initials Name Provider Type    HT Cheyenne Trejo, PT Student PT Student               Obj/Interventions     Row Name 03/29/23 1158          Range of Motion Comprehensive    General Range of Motion bilateral lower extremity ROM WFL (P)   -HT     Row Name 03/29/23 1158          Strength Comprehensive (MMT)    General Manual Muscle Testing (MMT) Assessment lower extremity strength deficits identified (P)   -HT     Comment, General Manual Muscle Testing (MMT) Assessment B hip flex 2-/5, B knee ext 2+/5, B DF 3+/5 (P)   -HT     Row Name 03/29/23 1158          Balance    Balance Assessment sitting static balance;sitting dynamic balance;standing static balance;standing dynamic balance (P)   -HT     Static Sitting Balance standby assist (P)   -HT     Dynamic Sitting Balance contact guard (P)   -HT     Position, Sitting Balance sitting edge of bed (P)   -HT     Static Standing Balance contact guard (P)   -HT     Dynamic Standing Balance minimal assist (P)   -HT      Position/Device Used, Standing Balance walker, rolling (P)   -HT     Row Name 03/29/23 0199          Sensory Assessment (Somatosensory)    Sensory Assessment (Somatosensory) LE sensation intact (P)   -HT           User Key  (r) = Recorded By, (t) = Taken By, (c) = Cosigned By    Initials Name Provider Type    HT Cheyenne Trejo, PT Student PT Student               Goals/Plan     Row Name 03/29/23 6429          Bed Mobility Goal 1 (PT)    Activity/Assistive Device (Bed Mobility Goal 1, PT) sit to supine/supine to sit (P)   -HT     [place] Level/Cues Needed (Bed Mobility Goal 1, PT) minimum assist (75% or more patient effort) (P)   -HT     Time Frame (Bed Mobility Goal 1, PT) long term goal (LTG);10 days (P)   -HT     Row Name 03/29/23 0844          Transfer Goal 1 (PT)    Activity/Assistive Device (Transfer Goal 1, PT) sit-to-stand/stand-to-sit;bed-to-chair/chair-to-bed;walker, rolling (P)   -HT     [place] Level/Cues Needed (Transfer Goal 1, PT) contact guard required (P)   -HT     Time Frame (Transfer Goal 1, PT) long term goal (LTG);10 days (P)   -HT     Row Name 03/29/23 7892          Gait Training Goal 1 (PT)    Activity/Assistive Device (Gait Training Goal 1, PT) gait (walking locomotion);assistive device use;walker, rolling (P)   -HT     [place] Level (Gait Training Goal 1, PT) contact guard required (P)   -HT     Distance (Gait Training Goal 1, PT) 50 (P)   -HT     Time Frame (Gait Training Goal 1, PT) long term goal (LTG);10 days (P)   -HT     Row Name 03/29/23 4334          Problem Specific Goal 1 (PT)    Problem Specific Goal 1 (PT) Pt to propel 150' in w/c to demonstrate ind mobility. (P)   -HT     Time Frame (Problem Specific Goal 1, PT) long-term goal (LTG);1 week (P)   -HT     Row Name 03/29/23 8090          Therapy Assessment/Plan (PT)    Planned Therapy Interventions (PT) balance training;bed mobility training;gait training;home exercise program;patient/family education;neuromuscular  re-education;strengthening;transfer training (P)   -HT           User Key  (r) = Recorded By, (t) = Taken By, (c) = Cosigned By    Initials Name Provider Type    HT Cheyenne Trejo, PT Student PT Student               Clinical Impression     Row Name 03/29/23 1201          Pain    Pretreatment Pain Rating 0/10 - no pain (P)   -HT     Posttreatment Pain Rating 0/10 - no pain (P)   -HT     Additional Documentation Pain Scale: Numbers Pre/Post-Treatment (Group) (P)   -HT     Row Name 03/29/23 1201          Plan of Care Review    Plan of Care Reviewed With patient (P)   -HT     Progress no change (P)   -HT     Outcome Evaluation Pt ambulated 6' with RW and min A showing limitations of decreased endurance, balance deficits, and generalized weakness. Rec skilled PT to increase ind with mobility and d/c to SNF. (P)   -HT     Row Name 03/29/23 1201          Therapy Assessment/Plan (PT)    Patient/Family Therapy Goals Statement (PT) return to PLOF (P)   -HT     Rehab Potential (PT) good, to achieve stated therapy goals (P)   -HT     Criteria for Skilled Interventions Met (PT) yes;meets criteria;skilled treatment is necessary (P)   -HT     Therapy Frequency (PT) daily (P)   -HT     Row Name 03/29/23 1201          Vital Signs    Pre Systolic BP Rehab 95 (P)   -HT     Pre Treatment Diastolic BP 54 (P)   -HT     Post Systolic BP Rehab 84 (P)   -HT     Post Treatment Diastolic BP 74 (P)   -HT     Pretreatment Heart Rate (beats/min) 56 (P)   -HT     Posttreatment Heart Rate (beats/min) 60 (P)   -HT     O2 Delivery Pre Treatment room air (P)   -HT     O2 Delivery Intra Treatment room air (P)   -HT     O2 Delivery Post Treatment room air (P)   -HT     Pre Patient Position Supine (P)   -HT     Intra Patient Position Standing (P)   -HT     Post Patient Position Sitting (P)   -HT     Row Name 03/29/23 1201          Positioning and Restraints    Pre-Treatment Position in bed (P)   -HT     Post Treatment Position chair (P)   -HT     In  Chair notified nsg;reclined;sitting;call light within reach;encouraged to call for assist;exit alarm on;LUE elevated;RUE elevated;waffle cushion;legs elevated (P)   -HT           User Key  (r) = Recorded By, (t) = Taken By, (c) = Cosigned By    Initials Name Provider Type     Cheyenne Trejo, PT Student PT Student               Outcome Measures     Row Name 03/29/23 1402          How much help from another person do you currently need...    Turning from your back to your side while in flat bed without using bedrails? 2 (P)   -HT     Moving from lying on back to sitting on the side of a flat bed without bedrails? 2 (P)   -HT     Moving to and from a bed to a chair (including a wheelchair)? 3 (P)   -HT     Standing up from a chair using your arms (e.g., wheelchair, bedside chair)? 2 (P)   -HT     Climbing 3-5 steps with a railing? 1 (P)   -HT     To walk in hospital room? 3 (P)   -HT     AM-PAC 6 Clicks Score (PT) 13 (P)   -HT     Highest level of mobility 4 --> Transferred to chair/commode (P)   -HT     Row Name 03/29/23 1402          Functional Assessment    Outcome Measure Options AM-PAC 6 Clicks Basic Mobility (PT) (P)   -HT           User Key  (r) = Recorded By, (t) = Taken By, (c) = Cosigned By    Initials Name Provider Type     Cheyenne Trejo, PT Student PT Student                             Physical Therapy Education     Title: PT OT SLP Therapies (In Progress)     Topic: Physical Therapy (In Progress)     Point: Mobility training (Done)     Learning Progress Summary           Patient Acceptance, E, VU,NR by  at 3/29/2023 1403                   Point: Home exercise program (Not Started)     Learner Progress:  Not documented in this visit.          Point: Body mechanics (Done)     Learning Progress Summary           Patient Acceptance, E, VU,NR by  at 3/29/2023 1403                   Point: Precautions (Done)     Learning Progress Summary           Patient Acceptance, E, VU,NR by  at 3/29/2023  1403                               User Key     Initials Effective Dates Name Provider Type Discipline     01/12/23 -  Cheyenne Trejo, PT Student PT Student PT              PT Recommendation and Plan  Planned Therapy Interventions (PT): (P) balance training, bed mobility training, gait training, home exercise program, patient/family education, neuromuscular re-education, strengthening, transfer training  Plan of Care Reviewed With: (P) patient  Progress: (P) no change  Outcome Evaluation: (P) Pt ambulated 6' with RW and min A showing limitations of decreased endurance, balance deficits, and generalized weakness. Rec skilled PT to increase ind with mobility and d/c to SNF.     Time Calculation:    PT Charges     Row Name 03/29/23 1404             Time Calculation    Start Time 1010 (P)   -HT      PT Received On 03/29/23 (P)   -HT      PT Goal Re-Cert Due Date 04/08/23 (P)   -HT         Timed Charges    95206 - PT Therapeutic Activity Minutes 8 (P)   -HT         Untimed Charges    PT Eval/Re-eval Minutes 50 (P)   -HT         Total Minutes    Timed Charges Total Minutes 8 (P)   -HT      Untimed Charges Total Minutes 50 (P)   -HT       Total Minutes 58 (P)   -HT            User Key  (r) = Recorded By, (t) = Taken By, (c) = Cosigned By    Initials Name Provider Type     Cheyenne Trejo, PT Student PT Student              Therapy Charges for Today     Code Description Service Date Service Provider Modifiers Qty    49751361501 HC PT THERAPEUTIC ACT EA 15 MIN 3/29/2023 Cheyenne Trejo, PT Student GP 1    24593221680 HC PT EVAL MOD COMPLEXITY 4 3/29/2023 Cheyenne Trejo, PT Student GP 1          PT G-Codes  Outcome Measure Options: (P) AM-PAC 6 Clicks Basic Mobility (PT)  AM-PAC 6 Clicks Score (PT): (P) 13  PT Discharge Summary  Anticipated Discharge Disposition (PT): (P) skilled nursing facility    CHETAN Patel  3/29/2023

## 2023-03-29 NOTE — PLAN OF CARE
Goal Outcome Evaluation:           Progress: improving  -VSS on roomair  -levo gtt at 0.04; did not tolerate being off levo  -pt up to chair with PT  -appetite not as good today  -pt drowsy today, A&O x4 for most of shift; disoriented to place once after waking from sleep  -albumin and lasix given for persistent low BP; mostly incontinent; female purewick somewhat successful  -adequate UOP; no BM/diarrhea  -son updated by telephone

## 2023-03-29 NOTE — PLAN OF CARE
Goal Outcome Evaluation:  Plan of Care Reviewed With: (P) patient        Progress: (P) no change  Outcome Evaluation: (P) Pt ambulated 6' with RW and min A showing limitations of decreased endurance, balance deficits, and generalized weakness. Rec skilled PT to increase ind with mobility and d/c to SNF.

## 2023-03-29 NOTE — PLAN OF CARE
Goal Outcome Evaluation:  Plan of Care Reviewed With: patient        Progress: no change  Outcome Evaluation: Pt orientedx4 majority of shift, pt rested well. 1u PRBC transfused at beginning of shift for persistent low BP, able to wean levo to 0.03. 80mg lasix given after blood admin with good UOP, however pt is incontinent - cannot obtain accurate I/O status. Phos recheck WNL.

## 2023-03-30 NOTE — PROGRESS NOTES
"Critical Care Note     LOS: 6 days   Patient Care Team:  Kingsley Downing MD as PCP - General (Family Medicine)  James Manzo MD as Consulting Physician (Cardiology)    Chief Complaint/Reason for visit:    Chief Complaint   Patient presents with   • Altered Mental Status      Septic shock (HCC)    Sepsis due to urinary tract infection (HCC)    Lactic acidosis    Altered mental status (acute encephalopathy)    Atrial fibrillation (HCC)    CKD stage 3, GFR 30-59 ml/min (HCC)    Hypothyroidism    Gout    Essential hypertension    Dementia (HCC)    Subjective     Interval History:     Continues to require some low-dose norepinephrine for blood pressure support.  He is in chronic atrial fibrillation with a controlled rate.  Received some albumin and Lasix yesterday with 1300 mils of urine plus unmeasured.  Remains afebrile.  Tolerating a p.o. diet.    Review of Systems:    All systems were reviewed and negative except as noted in subjective.    Medical history, surgical history, social history, family history reviewed    Objective     Intake/Output:    Intake/Output Summary (Last 24 hours) at 3/30/2023 1451  Last data filed at 3/30/2023 0900  Gross per 24 hour   Intake 629.11 ml   Output 1350 ml   Net -720.89 ml       Nutrition:  Diet: Regular/House Diet; Texture: Soft to Chew (NDD 3); Soft to Chew: Chopped Meat; Fluid Consistency: Thin (IDDSI 0)    Infusions:  norepinephrine, 0.02-0.3 mcg/kg/min, Last Rate: 0.02 mcg/kg/min (03/29/23 2350)        Telemetry:  atrial fibrillation             Vital Signs  Blood pressure 102/71, pulse 53, temperature 97.7 °F (36.5 °C), temperature source Axillary, resp. rate 18, height 175.3 cm (69\"), weight 90.4 kg (199 lb 4.7 oz), SpO2 98 %.    Physical Exam:  General Appearance:   Cachectic older gentleman in no distress, sitting upright in the chair eating breakfast   Head:   Temporal wasting   Eyes:          No jaundice   Ears:     Throat:  No exudate   Neck:  Trachea " midline, no palpable thyroid   Back:      Lungs:    Symmetric chest expansion.  Breath sounds are bilateral, shallow without wheeze or rhonchi    Heart:   Irregular rhythm, slow rate, S1, S2 auscultated   Abdomen:    Nondistended, bowel sounds present, soft   Rectal:   Deferred   Extremities:  Persistent 2+ lower extremity edema, edema of his hands.   Pulses:    Skin:  Warm and dry without rash   Lymph nodes:  No cervical adenopathy   Neurologic:  Hearing impaired.  Alert, answers questions and follows commands, generalized weakness      Results Review:     I reviewed the patient's new clinical results.   Results from last 7 days   Lab Units 03/30/23  0306 03/29/23  0428 03/28/23  1832 03/28/23  0441 03/26/23  0402 03/25/23  0327   SODIUM mmol/L 144 142  --  143 140 135*   POTASSIUM mmol/L 4.0 4.1 5.0 3.1* 4.0 4.6   CHLORIDE mmol/L 113* 112*  --  111* 107 103   CO2 mmol/L 22.0 19.0*  --  20.0* 15.0* 17.0*   BUN mg/dL 14 15  --  19 29* 30*   CREATININE mg/dL 0.72* 0.75*  --  0.88 1.40* 1.48*   CALCIUM mg/dL 8.2 8.1*  --  8.2 8.6 8.8   BILIRUBIN mg/dL  --  1.2  --   --  0.8 1.2   ALK PHOS U/L  --  750*  --   --  1,111* 1,179*   ALT (SGPT) U/L  --  5  --   --  7 7   AST (SGOT) U/L  --  12  --   --  19 25   GLUCOSE mg/dL 73 97  --  124* 112* 121*     Results from last 7 days   Lab Units 03/30/23  0306 03/29/23 0428 03/28/23  0441   WBC 10*3/mm3 5.46 5.94 5.72   HEMOGLOBIN g/dL 8.7* 8.8* 7.8*   HEMATOCRIT % 28.2* 28.5* 26.7*   PLATELETS 10*3/mm3 136* 139* 162   Cortisol stimulation test, baseline cortisol 11.4, increased to 20 at 30 minutes and 22 at 60 minutes      Lab Results   Component Value Date    BLOODCX No growth at 5 days 03/24/2023     Lab Results   Component Value Date    URINECX No growth 03/24/2023       I reviewed the patient's new imaging including images and reports.    Narrative:     •  Chronic right lower extremity deep vein thrombosis noted in the   posterior tibial.   •  Normal left lower extremity  "venous duplex scan.            All medications reviewed.   cholestyramine light, 1 packet, Oral, BID With Meals  Diclofenac Sodium, 2 g, Topical, 4x Daily  folic acid, 1 mg, Oral, Daily  heparin (porcine), 5,000 Units, Subcutaneous, Q8H  levothyroxine, 50 mcg, Oral, Daily  midodrine, 5 mg, Oral, TID AC  pantoprazole, 40 mg, Oral, Nightly  piperacillin-tazobactam, 3.375 g, Intravenous, Q8H  sodium chloride, 10 mL, Intravenous, Q12H          Assessment & Plan       Septic shock (HCC)    Hypothyroidism    Gout    Essential hypertension    Dementia (HCC)    CKD stage 3, GFR 30-59 ml/min ([facility])    Sepsis due to urinary tract infection.  No organism cultured ([facility])    Altered mental status improved    Atrial fibrillation with controlled rate ([facility])    89-year-old male who is a resident of [facility] in [place].  Records indicate PMH of hypertension, CKD, hypothyroidism, dementia, and gout.  Apparently became increasingly confused 3/24/2023 and was \"talking gibberish\".  Because of this he was transferred to [facility] ER.  He was unable to give a reliable history but denied dyspnea, chest pain, abdominal pain, headache, fever, chills, sweats, nausea, vomiting, or diarrhea.  At the time of arrival he was in atrial fibrillation with a heart rate of 112 and was hypotensive.  He had difficulty voiding but was able to give a small amount of urine with TNTC WBCs, 1+ bacteria, and positive for nitrites suggesting UTI (although cultures have never returned positive).  A Ambrose was attempted but nursing was unsuccessful.  CT scan and MRI was performed and was negative for any acute intracerebral events and chest x-ray revealed no infiltrates.  He was empirically given vancomycin and Zosyn, and when pressure remained low post fluid bolus, was begun on norepinephrine.  Although patient has been a documented DNR/DNI, ICU admission was requested due to the need for pressors.  Blood and urine cultures are negative.  Respiratory " PCR panel is negative.  Urinalysis however is suspicious for infection.  ACTH stimulation test was negative for adrenal insufficiency.    Renal function has normalized and he has developed some third spaced edema.  He has a chronic anemia and had drifted down to a hemoglobin of 7.8 compared to 10.  He did receive 1 unit of packed cells.  This did allow some decrease in his norepinephrine drip but we were not able to wean it completely off.  He continues to exhibit significant third spaced edema.  He remains +14 L.  He continues to require some norepinephrine.  He received albumin and Lasix yesterday with 1300 mL of urine output.  He is no longer receiving maintenance IV fluids.    He has a history of chronic diarrhea.  He does not like to eat because he will have diarrhea immediately after eating.  I initiated some cholestyramine before meals twice daily yesterday and diarrhea is significantly better.      Admission TSH was normal at 2.23, free T4 was 1.16 on the home dose of levothyroxine 50 mcg daily.      PLAN:    Continue norepinephrine for blood pressure support  Continue Zosyn for suspected urinary tract infection  Give albumin followed by Lasix to mobilize third spaced edema  Continue cholestyramine twice daily before meals for diarrhea, chronic  Holding digoxin for bradycardia  Continue thyroid replacement  Mobilize to the chair  Encourage p.o. intake  Continue midodrine and increase dose to 10 mg    VTE Prophylaxis: subcutaneous heparin    Stress Ulcer Prophylaxis: none    Flora Solorio MD  03/30/23  14:51 EDT      Time: 25 minutes

## 2023-03-30 NOTE — PLAN OF CARE
Goal Outcome Evaluation:  Plan of Care Reviewed With: (P) patient        Progress: (P) improving  Outcome Evaluation: (P) Pt ambulated 4+4' with CGA and RW showing limitations of decreased endurance, balance deficits, and BLE weakness. Rec skilled PT to increase ind with mobility and d/c to SNF.

## 2023-03-30 NOTE — CASE MANAGEMENT/SOCIAL WORK
Continued Stay Note   [facility]     Patient Name: Jeramie Ortiz  MRN: [id number]  Today's Date: 3/30/2023    Admit Date: 3/24/2023    Plan: IPR   Discharge Plan     Row Name 03/30/23 1149       Plan    Plan IPR    Patient/Family in Agreement with Plan yes    Plan Comments I have spoken to Mr. Ortiz's son, Nelson by phone today regarding the discharge plan which includes PT/OT recommendations for skilled nursing/rehab.  He requests referrals be submitted to [facility] and [facility].  I have confirmed with Yolanda at  that she is reviewing this referral.  CM will cont to follow the plan of care, await  decision and cont to assist with discharge needs as recommendations become available.    Final Discharge Disposition Code 03 - skilled nursing facility (SNF)               Discharge Codes    No documentation.                     Brittany Woodard RN

## 2023-03-30 NOTE — PLAN OF CARE
Goal Outcome Evaluation:  Plan of Care Reviewed With: patient        Progress: improving  Outcome Evaluation: .    - Pt remained oriented, rested well  - Remained afib with rates in 50-60s  - Attempted to wean Levophed, currently infusing at 0.04   - Adequate UOP, no BM

## 2023-03-30 NOTE — CONSULTS
Clinical Nutrition       Patient Name: Jeramie Ortiz  YOB: 1929  MRN: [id number]  Date of Encounter: 23 18:29 EDT  Admission date: 3/24/2023      Reason for Visit   MDR, Follow-up protocol      EMR Reviewed     Admission Diagnosis:  Sepsis, due to unspecified organism, unspecified whether acute organ dysfunction present (HCC) [A41.9]    Problem List:    Septic shock (HCC)    Hypothyroidism    Gout    Essential hypertension    Dementia (HCC)    CKD stage 3, GFR 30-59 ml/min (HCC)    Sepsis due to urinary tract infection.  No organism cultured ([facility])    Altered mental status improved    Atrial fibrillation with controlled rate ([facility])    GI: abdomen soft, nontender, last bm 3-27    SKIN: coccyx -contact dermatitis, possible stage 2 ulcer per WOC    Anthropometric      Height: 69in  Weight: 192lb bedscale  BMI: 28  BMI classification: Overweight: 25.0-29.9kg/m2      Weight change: weight loss x 3-4 months per son       Last 15 Recorded Weights  View Complete Flowsheet  Weight Weight (kg) Weight (lbs) Weight Method   3/27/2023 87.1 kg 192 lb 0.3 oz Bed scale   3/26/2023 88.905 kg 196 lb -   3/26/2023 89 kg 196 lb 3.4 oz Bed scale   3/25/2023 80.3 kg 177 lb 0.5 oz Bed scale   3/24/2023 77.5 kg 170 lb 13.7 oz Bed scale   3/24/2023 98.884 kg 218 lb Stated       Assessment 3-27-23------------------------------------------------------------------------------------    Kcal goal ~1800kcal  MSJ x 1.2: 1816kcal  20kcal per kkcal      Protein goal ~96g protein    1-1.2g protein per k-105g protein     Reported/Observed/Food/Nutrition Related - Comments     Per RN: pt did not eat much today, is drinking boost, swallows ok, edematous    Pt sitting up in chair, on nasal cannula, off levophed, very [place], voice is hoarse, reports he does not like icecream, likes grits, soup beans, cornbread, stewed apples, bananas, pudding, menu adjusted for tomorrow     Current Nutrition Prescription      Diet: Regular/House Diet; Texture: Soft to Chew (NDD 3); Soft to Chew: Chopped Meat; Fluid Consistency: Thin (IDDSI 0)    Boost Breeze 3x/day    Intake: 30% of 5 meals    Nutrition Diagnosis     3-27-23, 3-30-23  Problem Increased nutrient needs   Etiology Poor Skin Integrity   Signs/Symptoms SKIN: coccyx contact dermatitis, possible stage 2 ulcer per WOC       Actions     Follow treatment progress, Care plan reviewed, Advised available snacks, Interview for preferences, Menu provided, Menu adjusted, Adjusted supplement, Encourage intake     Will add Boost Pudding 3x/day in addition to Boost Breeze to increase oral intake    Monitor Per Protocol      Rosario Mace RD  Time Spent: 30 min

## 2023-03-30 NOTE — PLAN OF CARE
Goal Outcome Evaluation:  Plan of Care Reviewed With: patient        Progress: improving  Outcome Evaluation: Pt presents w/ generalized weakness, decreased balance, and difficulty sequencing limiting functional independence from baseline. Pt would benefit from cont skilled IPOT POC to facilitate return to PLOF. Recommend pt DC to SNF.

## 2023-03-30 NOTE — PLAN OF CARE
Goal Outcome Evaluation:   Levo off. Midodrine increased. BP soft but MAP adequate. Up to chair today. Weight shifting, up to BSC a few times, and worked with PT. RA-2LNC. Sats >95%. Albumin and diuretic given today. UOP adequate. Afebrile. No family at bedside. Updated son over the phone.

## 2023-03-30 NOTE — THERAPY EVALUATION
Patient Name: Jeramie Ortiz  : 12/15/1929    MRN: [id number]                              Today's Date: 3/30/2023       Admit Date: 3/24/2023    Visit Dx:     ICD-10-CM ICD-9-CM   1. Sepsis, due to unspecified organism, unspecified whether acute organ dysfunction present (HCC)  A41.9 038.9     995.91   2. New onset atrial fibrillation (HCC)  I48.91 427.31     Patient Active Problem List   Diagnosis   • Hypothyroidism   • Gout   • Essential hypertension   • Dementia (HCC)   • CKD stage 3, GFR 30-59 ml/min (HCC)   • Septic shock (HCC)   • Sepsis due to urinary tract infection.  No organism cultured ([facility])   • Altered mental status improved   • Atrial fibrillation with controlled rate (HCC)     Past Medical History:   Diagnosis Date   • CKD (chronic kidney disease) stage 3, GFR 30-59 ml/min ([facility]) 3/24/2023   • Dementia ([facility]) 3/24/2023   • Essential hypertension 3/24/2023   • Gout 3/24/2023   • Hypothyroidism 3/24/2023     Past Surgical History:   Procedure Laterality Date   • CHOLECYSTECTOMY     • PROSTATE SURGERY     • REPLACEMENT TOTAL KNEE BILATERAL        General Information     Row Name 23 1125          OT Time and Intention    Document Type evaluation  -CS     Mode of Treatment occupational therapy  -CS     Row Name 23 1125          General Information    Patient Profile Reviewed yes  -CS     Prior Level of Function min assist:;all household mobility;mod assist:;ADL's  TBA further, no family present  -CS     Existing Precautions/Restrictions fall  -CS     Barriers to Rehab medically complex;previous functional deficit;cognitive status  -CS     Row Name 23 1125          Living Environment    People in Home facility resident  [facility]  -CS     Row Name 23 1125          Cognition    Orientation Status (Cognition) oriented x 3  -CS     Row Name 23 1125          Safety Issues, Functional Mobility    Impairments Affecting Function (Mobility) balance;cognition;endurance/activity  tolerance;strength;postural/trunk control;motor planning  -CS     Cognitive Impairments, Mobility Safety/Performance insight into deficits/self-awareness;sequencing abilities;awareness, need for assistance  -CS           User Key  (r) = Recorded By, (t) = Taken By, (c) = Cosigned By    Initials Name Provider Type    CS Janet Araiza, LENIN Occupational Therapist                 Mobility/ADL's     Row Name 03/30/23 1126          Transfers    Transfers bed-chair transfer;sit-stand transfer;stand-sit transfer;toilet transfer  -CS     Row Name 03/30/23 1126          Bed-Chair Transfer    Bed-Chair [place] (Transfers) moderate assist (50% patient effort);verbal cues  -CS     Assistive Device (Bed-Chair Transfers) walker, front-wheeled  -CS     Row Name 03/30/23 1126          Sit-Stand Transfer    Sit-Stand [place] (Transfers) moderate assist (50% patient effort);verbal cues  -CS     Assistive Device (Sit-Stand Transfers) walker, front-wheeled  -CS     Row Name 03/30/23 1126          Stand-Sit Transfer    Stand-Sit [place] (Transfers) moderate assist (50% patient effort);verbal cues  -CS     Assistive Device (Stand-Sit Transfers) walker, front-wheeled  -CS     Row Name 03/30/23 1126          Toilet Transfer    Type (Toilet Transfer) stand pivot/stand step  -CS     [place] Level (Toilet Transfer) moderate assist (50% patient effort);verbal cues  -CS     Assistive Device (Toilet Transfer) walker, front-wheeled  -CS     Row Name 03/30/23 1126          Activities of Daily Living    BADL Assessment/Intervention toileting;bathing  -CS     Row Name 03/30/23 1126          Toileting Assessment/Training    [place] Level (Toileting) adjust/manage clothing;change pad/brief;perform perineal hygiene;dependent (less than 25% patient effort)  -CS     Position (Toileting) supported sitting;supported standing  -CS     Row Name 03/30/23 1126          Bathing Assessment/Intervention    [place] Level (Bathing)  perineal area;dependent (less than 25% patient effort)  -CS     Position (Bathing) supported sitting;supported standing  -CS           User Key  (r) = Recorded By, (t) = Taken By, (c) = Cosigned By    Initials Name Provider Type    CS Janet Araiza OT Occupational Therapist               Obj/Interventions     Row Name 03/30/23 1127          Sensory Assessment (Somatosensory)    Sensory Assessment (Somatosensory) UE sensation intact  -[facility] Name 03/30/23 1127          Range of Motion Comprehensive    General Range of Motion bilateral upper extremity ROM WFL  -     Row Name 03/30/23 1127          Strength Comprehensive (MMT)    Comment, General Manual Muscle Testing (MMT) Assessment BUE grossly 3+/5  -CS     Row Name 03/30/23 1127          Balance    Balance Assessment sitting static balance;sitting dynamic balance;standing static balance;standing dynamic balance  -CS     Static Sitting Balance contact guard  -CS     Dynamic Sitting Balance contact guard  -CS     Position, Sitting Balance sitting in chair  -CS     Static Standing Balance minimal assist  -CS     Dynamic Standing Balance minimal assist  -CS     Position/Device Used, Standing Balance walker, rolling  -CS     Balance Interventions sitting;static;dynamic;dynamic reaching;occupation based/functional task;weight shifting activity  -CS           User Key  (r) = Recorded By, (t) = Taken By, (c) = Cosigned By    Initials Name Provider Type    CS Janet Araiza OT Occupational Therapist               Goals/Plan     Row Name 03/30/23 1129          Transfer Goal 1 (OT)    Activity/Assistive Device (Transfer Goal 1, OT) sit-to-stand/stand-to-sit;toilet  -CS     [place] Level/Cues Needed (Transfer Goal 1, OT) minimum assist (75% or more patient effort)  -CS     Time Frame (Transfer Goal 1, OT) long term goal (LTG);10 days  -     Progress/Outcome (Transfer Goal 1, OT) goal ongoing  -     Row Name 03/30/23 1129          Toileting Goal 1 (OT)     Activity/Device (Toileting Goal 1, OT) adjust/manage clothing;perform perineal hygiene  -CS     [place] Level/Cues Needed (Toileting Goal 1, OT) moderate assist (50-74% patient effort)  -CS     Time Frame (Toileting Goal 1, OT) long term goal (LTG);10 days  -CS     Progress/Outcome (Toileting Goal 1, OT) goal ongoing  -CS     Row Name 03/30/23 1129          Grooming Goal 1 (OT)    Activity/Device (Grooming Goal 1, OT) hair care;wash face, hands;oral care  -CS     [place] (Grooming Goal 1, OT) supervision required  -CS     Time Frame (Grooming Goal 1, OT) long term goal (LTG);10 days  -CS     Strategies/Barriers (Grooming Goal 1, OT) unsupported sitting  -CS     Progress/Outcome (Grooming Goal 1, OT) goal ongoing  -CS     Row Name 03/30/23 1129          Therapy Assessment/Plan (OT)    Planned Therapy Interventions (OT) activity tolerance training;adaptive equipment training;BADL retraining;occupation/activity based interventions;ROM/therapeutic exercise;functional balance retraining;strengthening exercise;transfer/mobility retraining  -CS           User Key  (r) = Recorded By, (t) = Taken By, (c) = Cosigned By    Initials Name Provider Type    CS Janet Araiza, OT Occupational Therapist               Clinical Impression     Row Name 03/30/23 1128          Pain Assessment    Pretreatment Pain Rating 0/10 - no pain  -CS     Posttreatment Pain Rating 0/10 - no pain  -CS     Row Name 03/30/23 1128          Plan of Care Review    Plan of Care Reviewed With patient  -CS     Progress improving  -CS     Outcome Evaluation Pt presents w/ generalized weakness, decreased balance, and difficulty sequencing limiting functional independence from baseline. Pt would benefit from cont skilled IPOT POC to facilitate return to PLOF. Recommend pt DC to SNF.  -CS     Row Name 03/30/23 1128          Therapy Assessment/Plan (OT)    Patient/Family Therapy Goal Statement (OT) Return to PLOF  -CS     Rehab Potential (OT) good, to  achieve stated therapy goals  -CS     Criteria for Skilled Therapeutic Interventions Met (OT) yes;skilled treatment is necessary  -CS     Therapy Frequency (OT) daily  -CS     Row Name 03/30/23 1128          Therapy Plan Review/Discharge Plan (OT)    Anticipated Discharge Disposition (OT) skilled nursing facility  -     Row Name 03/30/23 1128          Vital Signs    Pre Systolic BP Rehab --  VSS  -CS     O2 Delivery Pre Treatment room air  -CS     O2 Delivery Intra Treatment room air  -CS     O2 Delivery Post Treatment room air  -CS     Pre Patient Position Sitting  -CS     Intra Patient Position Standing  -CS     Post Patient Position Sitting  -CS     Row Name 03/30/23 1128          Positioning and Restraints    Pre-Treatment Position bedside commode  -CS     Post Treatment Position chair  -CS     In Chair notified nsg;reclined;call light within reach;encouraged to call for assist;exit alarm on;RUE elevated;LUE elevated;waffle cushion;legs elevated;heels elevated;with nsg  -CS           User Key  (r) = Recorded By, (t) = Taken By, (c) = Cosigned By    Initials Name Provider Type    Janet Owusu OT Occupational Therapist               Outcome Measures     Row Name 03/30/23 1130          How much help from another is currently needed...    Putting on and taking off regular lower body clothing? 1  -CS     Bathing (including washing, rinsing, and drying) 2  -CS     Toileting (which includes using toilet bed pan or urinal) 1  -CS     Putting on and taking off regular upper body clothing 2  -CS     Taking care of personal grooming (such as brushing teeth) 3  -CS     Eating meals 3  -CS     AM-PAC 6 Clicks Score (OT) 12  -CS     Row Name 03/30/23 1130          Functional Assessment    Outcome Measure Options AM-PAC 6 Clicks Daily Activity (OT)  -CS           User Key  (r) = Recorded By, (t) = Taken By, (c) = Cosigned By    Initials Name Provider Type    Janet Owusu OT Occupational Therapist                 Occupational Therapy Education     Title: PT OT SLP Therapies (In Progress)     Topic: Occupational Therapy (In Progress)     Point: ADL training (In Progress)     Description:   Instruct learner(s) on proper safety adaptation and remediation techniques during self care or transfers.   Instruct in proper use of assistive devices.              Learning Progress Summary           Patient Acceptance, E, NR by  at 3/30/2023 1131                   Point: Home exercise program (Not Started)     Description:   Instruct learner(s) on appropriate technique for monitoring, assisting and/or progressing therapeutic exercises/activities.              Learner Progress:  Not documented in this visit.          Point: Precautions (In Progress)     Description:   Instruct learner(s) on prescribed precautions during self-care and functional transfers.              Learning Progress Summary           Patient Acceptance, E, NR by  at 3/30/2023 1131                   Point: Body mechanics (In Progress)     Description:   Instruct learner(s) on proper positioning and spine alignment during self-care, functional mobility activities and/or exercises.              Learning Progress Summary           Patient Acceptance, E, NR by  at 3/30/2023 1131                               User Key     Initials Effective Dates Name Provider Type Discipline     09/02/21 -  Janet Araiza OT Occupational Therapist OT              OT Recommendation and Plan  Planned Therapy Interventions (OT): activity tolerance training, adaptive equipment training, BADL retraining, occupation/activity based interventions, ROM/therapeutic exercise, functional balance retraining, strengthening exercise, transfer/mobility retraining  Therapy Frequency (OT): daily  Plan of Care Review  Plan of Care Reviewed With: patient  Progress: improving  Outcome Evaluation: Pt presents w/ generalized weakness, decreased balance, and difficulty sequencing limiting functional  independence from baseline. Pt would benefit from cont skilled IPOT POC to facilitate return to PLOF. Recommend pt DC to SNF.     Time Calculation:    Time Calculation- OT     Row Name 03/30/23 1135             Time Calculation- OT    OT Start Time 0930  -CS      OT Received On 03/30/23  -CS      OT Goal Re-Cert Due Date 04/09/23  -CS         Untimed Charges    OT Eval/Re-eval Minutes 46  -CS         Total Minutes    Untimed Charges Total Minutes 46  -CS       Total Minutes 46  -CS            User Key  (r) = Recorded By, (t) = Taken By, (c) = Cosigned By    Initials Name Provider Type    CS Janet Araiza OT Occupational Therapist              Therapy Charges for Today     Code Description Service Date Service Provider Modifiers Qty    21408303376 HC OT EVAL LOW COMPLEXITY 4 3/30/2023 Janet Aariza OT GO 1               Janet Araiza OT  3/30/2023

## 2023-03-30 NOTE — THERAPY TREATMENT NOTE
Patient Name: Jeramie Ortiz  : 12/15/1929    MRN: [id number]                              Today's Date: 3/30/2023       Admit Date: 3/24/2023    Visit Dx:     ICD-10-CM ICD-9-CM   1. Sepsis, due to unspecified organism, unspecified whether acute organ dysfunction present (HCC)  A41.9 038.9     995.91   2. New onset atrial fibrillation (HCC)  I48.91 427.31     Patient Active Problem List   Diagnosis   • Hypothyroidism   • Gout   • Essential hypertension   • Dementia (HCC)   • CKD stage 3, GFR 30-59 ml/min (HCC)   • Septic shock (HCC)   • Sepsis due to urinary tract infection.  No organism cultured (HCC)   • Altered mental status improved   • Atrial fibrillation with controlled rate (HCC)     Past Medical History:   Diagnosis Date   • CKD (chronic kidney disease) stage 3, GFR 30-59 ml/min ([facility]) 3/24/2023   • Dementia (HCC) 3/24/2023   • Essential hypertension 3/24/2023   • Gout 3/24/2023   • Hypothyroidism 3/24/2023     Past Surgical History:   Procedure Laterality Date   • CHOLECYSTECTOMY     • PROSTATE SURGERY     • REPLACEMENT TOTAL KNEE BILATERAL        General Information     Row Name 23 1450          Physical Therapy Time and Intention    Document Type therapy note (daily note) (P)   -HT     Mode of Treatment physical therapy (P)   -HT     Row Name 23 1450          General Information    Patient Profile Reviewed yes (P)   -HT     Existing Precautions/Restrictions fall (P)   -HT     Barriers to Rehab medically complex;previous functional deficit;cognitive status (P)   -HT     Row Name 23 1450          Cognition    Orientation Status (Cognition) oriented x 3 (P)   -HT     Row Name 23 1450          Safety Issues, Functional Mobility    Safety Issues Affecting Function (Mobility) awareness of need for assistance;insight into deficits/self-awareness;judgment;problem-solving;safety precaution awareness;safety precautions follow-through/compliance;sequencing abilities (P)   -HT     Impairments  Affecting Function (Mobility) balance;cognition;endurance/activity tolerance;strength;motor planning (P)   -HT     Cognitive Impairments, Mobility Safety/Performance insight into deficits/self-awareness;safety precaution awareness;safety precaution follow-through (P)   -HT           User Key  (r) = Recorded By, (t) = Taken By, (c) = Cosigned By    Initials Name Provider Type    HT Cheyenne Trejo, PT Student PT Student               Mobility     Row Name 03/30/23 1451          Bed Mobility    Comment, (Bed Mobility) Pt [facility] upon arrival. (P)   -HT     Row Name 03/30/23 1451          Sit-Stand Transfer    Sit-Stand [place] (Transfers) moderate assist (50% patient effort);1 person assist (P)   -HT     Assistive Device (Sit-Stand Transfers) walker, front-wheeled (P)   -HT     Comment, (Sit-Stand Transfer) 2x STS, 1x from chair and 1x from bedside commode. (P)   -HT     Row Name 03/30/23 1451          Gait/Stairs (Locomotion)    [place] Level (Gait) contact guard;verbal cues (P)   -HT     Assistive Device (Gait) walker, front-wheeled (P)   -HT     Distance in Feet (Gait) 4+4 (P)   -HT     Deviations/Abnormal Patterns (Gait) bilateral deviations;base of support, narrow;adrian decreased;stride length decreased;weight shifting decreased (P)   -HT     Bilateral Gait Deviations heel strike decreased;forward flexed posture (P)   -HT     Comment, (Gait/Stairs) Pt ambulated to/from bedside commode demonstrating slight step through gait pattern showing decreased adrian requiring increased time to complete, fwd trunk, and decreased heel strike B requiring tactile cues to initiate gait, cues for upright posture, and cues for increased stride length. Gait limited by decreased endurance, BLE weakness, and balance deficits. (P)   -HT           User Key  (r) = Recorded By, (t) = Taken By, (c) = Cosigned By    Initials Name Provider Type    HT Cheyenne Trejo, PT Student PT Student               Obj/Interventions     Row  Name 03/30/23 1456          Motor Skills    Therapeutic Exercise knee;ankle (P)   -HT     Row Name 03/30/23 1456          Knee (Therapeutic Exercise)    Knee (Therapeutic Exercise) strengthening exercise (P)   -HT     Knee Strengthening (Therapeutic Exercise) bilateral;LAQ (long arc quad);sitting;10 repetitions;other (see comments) (P)   lacking 10 degrees knee ext throughout  -HT     Row Name 03/30/23 1456          Ankle (Therapeutic Exercise)    Ankle (Therapeutic Exercise) strengthening exercise (P)   -HT     Ankle Strengthening (Therapeutic Exercise) bilateral;dorsiflexion;plantarflexion;15 repititions;sitting (P)   -HT     Row Name 03/30/23 1456          Balance    Balance Assessment sitting static balance;sitting dynamic balance;standing static balance;standing dynamic balance (P)   -HT     Static Sitting Balance standby assist (P)   -HT     Dynamic Sitting Balance contact guard (P)   -HT     Position, Sitting Balance sitting in chair (P)   -HT     Static Standing Balance standby assist (P)   -HT     Dynamic Standing Balance contact guard (P)   -HT     Position/Device Used, Standing Balance walker, rolling (P)   -HT           User Key  (r) = Recorded By, (t) = Taken By, (c) = Cosigned By    Initials Name Provider Type    HT Cheyenne Trejo, PT Student PT Student               Goals/Plan    No documentation.                Clinical Impression     Row Name 03/30/23 1457          Pain    Pretreatment Pain Rating 0/10 - no pain (P)   -HT     Posttreatment Pain Rating 0/10 - no pain (P)   -HT     Additional Documentation Pain Scale: Numbers Pre/Post-Treatment (Group) (P)   -HT     Row Name 03/30/23 1457          Plan of Care Review    Plan of Care Reviewed With patient (P)   -HT     Progress improving (P)   -HT     Outcome Evaluation Pt ambulated 4+4' with CGA and RW showing limitations of decreased endurance, balance deficits, and BLE weakness. Rec skilled PT to increase ind with mobility and d/c to SNF. (P)    -HT     Row Name 03/30/23 1457          Therapy Assessment/Plan (PT)    Criteria for Skilled Interventions Met (PT) yes;meets criteria;skilled treatment is necessary (P)   -HT     Row Name 03/30/23 1457          Vital Signs    Pre Systolic BP Rehab 106 (P)   -HT     Pre Treatment Diastolic BP 55 (P)   -HT     Post Systolic BP Rehab 103 (P)   -HT     Post Treatment Diastolic BP 53 (P)   -HT     Pretreatment Heart Rate (beats/min) 59 (P)   -HT     Posttreatment Heart Rate (beats/min) 58 (P)   -HT     Pre SpO2 (%) 96 (P)   -HT     O2 Delivery Pre Treatment room air (P)   -HT     O2 Delivery Intra Treatment room air (P)   -HT     Post SpO2 (%) 98 (P)   -HT     O2 Delivery Post Treatment room air (P)   -HT     Pre Patient Position Sitting (P)   -HT     Intra Patient Position Standing (P)   -HT     Post Patient Position Sitting (P)   -HT     Row Name 03/30/23 1457          Positioning and Restraints    Pre-Treatment Position sitting in chair/recliner (P)   -HT     Post Treatment Position chair (P)   -HT     In Chair notified nsg;reclined;sitting;call light within reach;encouraged to call for assist;exit alarm on;LUE elevated;RUE elevated;waffle cushion;legs elevated (P)   -HT           User Key  (r) = Recorded By, (t) = Taken By, (c) = Cosigned By    Initials Name Provider Type    HT Cheyenne Trejo, PT Student PT Student               Outcome Measures     Row Name 03/30/23 1500          How much help from another person do you currently need...    Turning from your back to your side while in flat bed without using bedrails? 2 (P)   -HT     Moving from lying on back to sitting on the side of a flat bed without bedrails? 2 (P)   -HT     Moving to and from a bed to a chair (including a wheelchair)? 3 (P)   -HT     Standing up from a chair using your arms (e.g., wheelchair, bedside chair)? 2 (P)   -HT     Climbing 3-5 steps with a railing? 1 (P)   -HT     To walk in hospital room? 3 (P)   -HT     AM-PAC 6 Clicks Score (PT)  13 (P)   -HT     Highest level of mobility 4 --> Transferred to chair/commode (P)   -HT     Row Name 03/30/23 1500 03/30/23 1130       Functional Assessment    Outcome Measure Options AM-PAC 6 Clicks Basic Mobility (PT) (P)   -HT AM-PAC 6 Clicks Daily Activity (OT)  -CS          User Key  (r) = Recorded By, (t) = Taken By, (c) = Cosigned By    Initials Name Provider Type    CS Janet Araiza, LENIN Occupational Therapist     Cheyenne Trejo, PT Student PT Student                             Physical Therapy Education     Title: PT OT SLP Therapies (In Progress)     Topic: Physical Therapy (Done)     Point: Mobility training (Done)     Learning Progress Summary           Patient Acceptance, E, VU,NR by  at 3/30/2023 1501    Acceptance, E, VU,NR by  at 3/29/2023 1403                   Point: Home exercise program (Done)     Learning Progress Summary           Patient Acceptance, E, VU,NR by  at 3/30/2023 1501                   Point: Body mechanics (Done)     Learning Progress Summary           Patient Acceptance, E, VU,NR by  at 3/30/2023 1501    Acceptance, E, VU,NR by HT at 3/29/2023 1403                   Point: Precautions (Done)     Learning Progress Summary           Patient Acceptance, E, VU,NR by  at 3/30/2023 1501    Acceptance, E, VU,NR by  at 3/29/2023 1403                               User Key     Initials Effective Dates Name Provider Type Discipline     01/12/23 -  Cheyenne Trejo, PT Student PT Student PT              PT Recommendation and Plan  Planned Therapy Interventions (PT): balance training, bed mobility training, gait training, home exercise program, patient/family education, neuromuscular re-education, strengthening, transfer training  Plan of Care Reviewed With: (P) patient  Progress: (P) improving  Outcome Evaluation: (P) Pt ambulated 4+4' with CGA and RW showing limitations of decreased endurance, balance deficits, and BLE weakness. Rec skilled PT to increase ind with  mobility and d/c to SNF.     Time Calculation:    PT Charges     Row Name 03/30/23 1501             Time Calculation    Start Time 1408 (P)   -HT      PT Received On 03/30/23 (P)   -HT         Timed Charges    27051 - PT Therapeutic Exercise Minutes 8 (P)   -HT      76878 - PT Therapeutic Activity Minutes 20 (P)   -HT         Total Minutes    Timed Charges Total Minutes 28 (P)   -HT       Total Minutes 28 (P)   -HT            User Key  (r) = Recorded By, (t) = Taken By, (c) = Cosigned By    Initials Name Provider Type    HT Cheyenne Trejo, PT Student PT Student              Therapy Charges for Today     Code Description Service Date Service Provider Modifiers Qty    67989602933 HC PT THERAPEUTIC ACT EA 15 MIN 3/29/2023 Cheyenne Trejo, PT Student GP 1    48709020366 HC PT EVAL MOD COMPLEXITY 4 3/29/2023 Cheyenne Trejo, PT Student GP 1    10338006370 HC PT THER PROC EA 15 MIN 3/30/2023 Cheyenne Trejo, PT Student GP 1    69774426833 HC PT THERAPEUTIC ACT EA 15 MIN 3/30/2023 Cheyenne Trejo, PT Student GP 1          PT G-Codes  Outcome Measure Options: (P) AM-PAC 6 Clicks Basic Mobility (PT)  AM-PAC 6 Clicks Score (PT): (P) 13  AM-PAC 6 Clicks Score (OT): 12  PT Discharge Summary  Anticipated Discharge Disposition (PT): skilled nursing facility    Cheyenne Trejo PT Student  3/30/2023

## 2023-03-31 NOTE — PLAN OF CARE
Goal Outcome Evaluation:  Plan of Care Reviewed With: patient        Progress: improving  Outcome Evaluation: .    - BP remains stbale off Levo. Numbers are soft this AM, but maintaining MAP>60   - O2 on RA  - Adequate UOP, no BM  - Mg and potassium replaced this AM

## 2023-03-31 NOTE — PROGRESS NOTES
Intensive Care Follow-up     Hospital:  LOS: 7 days   Mr. Jeramie Ortiz, 89 y.o. male is followed for:   Septic shock (HCC)        Subjective   Interval History:  The chart has been reviewed. No acute events overnight. AM labs with slightly low K (3.4) and magnesium (1.8).     Patient resting supine in bed this AM in NAD. Levophed has been off since yesterday afternoon and BP has remained stable. Remains in controlled atrial fibrillation with slow rate (HR 40s-50's). Oxygenating appropriately on RA. Not eating much but does consistently drink his Boost supplements. He has worked with PT & OT who are recommending SNF upon discharge.      He denies current fever, chills, chest pain, shortness of breath, nausea, vomiting, diarrhea, or abdominal discomfort.      The patient's past medical, surgical and social history were reviewed and updated in Epic as appropriate.       Objective     Infusions:  norepinephrine, 0.02-0.3 mcg/kg/min, Last Rate: Stopped (03/30/23 1531)      Medications:  cholestyramine light, 1 packet, Oral, BID With Meals  Diclofenac Sodium, 2 g, Topical, 4x Daily  folic acid, 1 mg, Oral, Daily  heparin (porcine), 5,000 Units, Subcutaneous, Q8H  levothyroxine, 50 mcg, Oral, Daily  magnesium sulfate, 4 g, Intravenous, Once  midodrine, 10 mg, Oral, TID AC  pantoprazole, 40 mg, Oral, Nightly  piperacillin-tazobactam, 3.375 g, Intravenous, Q8H  sodium chloride, 10 mL, Intravenous, Q12H    I reviewed the patient's medications.    Vital Sign Min/Max for last 24 hours  Temp  Min: 96.4 °F (35.8 °C)  Max: 97.8 °F (36.6 °C)   BP  Min: 77/59  Max: 112/55   Pulse  Min: 39  Max: 76   Resp  Min: 14  Max: 20   SpO2  Min: 87 %  Max: 100 %   No data recorded       Input/Output for last 24 hour shift  03/30 0701 - 03/31 0700  In: 637.6 [P.O.:180; I.V.:356.1]  Out: 300 [Urine:300]      Physical Exam:  GENERAL: Elderly male lying supine in bed and conversant. Hearing impaired. No acute distress.   HEENT: Normocephalic and  atraumatic. Trachea midline. PER. EOM WNL.   LUNGS: Chest rise of normal depth and symmetric. Lungs clear to auscultation bilaterally. No wheezes, rhonchi, or rales.   HEART: S1,S2 detected. Bradycardic rate and irregular rhythm. Atrial fibrillation noted on monitor. No rub, murmur, or gallop.   ABDOMEN: Soft, round, nondistended, and nontender. Bowel sounds present.   EXTREMITIES: No clubbing or cyanosis. Peripheral pulses present. Skin warm and dry. Bilateral lower extremity 3+ pitting edema noted.    NEURO/PSYCH: Alert and oriented. Follows commands. Moves all extremities. No focal deficits.      Results from last 7 days   Lab Units 03/31/23  0423 03/30/23  0306 03/29/23 0428   WBC 10*3/mm3 4.66 5.46 5.94   HEMOGLOBIN g/dL 8.5* 8.7* 8.8*   PLATELETS 10*3/mm3 114* 136* 139*     Results from last 7 days   Lab Units 03/31/23  0423 03/30/23  0306 03/29/23  0428 03/29/23  0128 03/28/23  1832 03/28/23  1519 03/28/23  0441   SODIUM mmol/L 145 144 142  --   --   --  143   POTASSIUM mmol/L 3.4* 4.0 4.1  --    < >  --  3.1*   CO2 mmol/L 22.0 22.0 19.0*  --   --   --  20.0*   BUN mg/dL 13 14 15  --   --   --  19   CREATININE mg/dL 0.66* 0.72* 0.75*  --   --   --  0.88   MAGNESIUM mg/dL 1.8 2.2 1.9  --   --   --  1.6*   PHOSPHORUS mg/dL  --   --   --  2.5  --  1.6* 1.9*   GLUCOSE mg/dL 76 73 97  --   --   --  124*    < > = values in this interval not displayed.     Estimated Creatinine Clearance: 82 mL/min (A) (by C-G formula based on SCr of 0.66 mg/dL (L)).        I reviewed the patient's new clinical results.  I reviewed the patient's new imaging results/reports including actual images and agree with reports.     Assessment & Plan   Impression      Septic shock (HCC)    Hypothyroidism    Gout    Essential hypertension    Dementia (HCC)    CKD stage 3, GFR 30-59 ml/min (HCC)    Sepsis due to urinary tract infection.  No organism cultured (HCC)    Altered mental status improved    Atrial fibrillation with controlled rate  (HCC)    89 yoM resident of [facility] with PMH of HTN, hypothyroidism, CKD, dementia, and gout who was admitted to [facility] ICU 3/24/23 with AMS noted to be hypotensive with AF RVR. Labs noted JUAN PABLO (sCr 1.72) and UA concerning for UTI (although culture never returned positive). He was placed on empiric Vancomycin / Zosyn and as he remained hypotensive despite IVF was placed on a Levophed infusion. ACTH test was negative for adrenal insufficiency. He was ultimately initiated on midodrine and weaned from pressors.     His renal function normalized and he developed significant third spacing edema which has gradually improved with albumin & Lasix administration.     He has a history of chronic diarrhea (occurring immediately after eating) and was initiated on cholestyramine before meals with improvement.      Plan        1. For Septic shock with suspected urinary source: UA on admission concerning for UTI, however urine culture ultimately negative. Furthermore, respiratory PCR, MRSA PCR, and BC x2 all negative. Continue Zosyn through today to complete 7 day empiric ABX. BP improved, patient off Levophed since yesterday afternoon. Continue midodrine 10 mg TID before meals.    2. For Electrolyte Disturbance (Hypokalemia, Hypomagnesemia): Replace electrolytes per protocol. Recheck in AM.   3. For AF RVR: HR more controlled at present. Digoxin was discontinued 2* bradycardia.   4. For JUAN PABLO superimposed on CKD: Renal function improved and is stable. Continue to monitor and avoid nephrotoxic agents.   5. For Hypothyroidism: Continue levothyroxine.   6. For Debility: Continue PT & OT.   7. AM labs.     DVT Prophylaxis: SQ Heparin   GI Prophylaxis: PPI  Dispo: Transfer to telemetry; Hospitalists to assume attending role in AM    Time spent: 37 minutes   Plan of care and goals reviewed with multidisciplinary/antibiotic stewardship team during rounds.   I discussed the patient's findings and my recommendations with patient,  nursing staff and primary care team     Beverly Conklin, DNP, APRN, AGACNP-BC  Pulmonary and Critical Care Medicine

## 2023-03-31 NOTE — CASE MANAGEMENT/SOCIAL WORK
Continued Stay Note  [facility]     Patient Name: Jeramie Ortiz  MRN: [id number]  Today's Date: 3/31/2023    Admit Date: 3/24/2023    Plan: [facility]   Discharge Plan     Row Name 03/31/23 1738       Plan    Plan [facility]    Patient/Family in Agreement with Plan yes    Plan Comments I have spoken to Nelson, Mr. Bobo's son today regarding bed offer from [facility].  He is agreeable to the bed offer.  Natasha at  has informed  that she has submitted the precert late today.   will f/u with  this weekend regarding pending precert, update Nelson, his son regarding discharge plans, arrange transportation when appropriate and cont to provide assistance with discharge needs as recommendations become available.    Final Discharge Disposition Code 62 - inpatient rehab facility               Discharge Codes    No documentation.                     Brittany Woodard RN

## 2023-04-01 NOTE — PROGRESS NOTES
[facility] Medicine Services  PROGRESS NOTE    Patient Name: Jeramie Ortiz  : 12/15/1929  MRN: [id number]    Date of Admission: 3/24/2023  Primary Care Physician: Kingsley Downing MD    Subjective   Subjective     CC:  Follow-up for septic shock    HPI:  Resting in chair in no acute distress and does not have any specific complaint.  No fever or chills.  No chest pain palpitation shortness of breath.  No nausea vomiting or diarrhea or abdominal pain.    ROS:  As above    Objective   Objective     Vital Signs:   Temp:  [96.5 °F (35.8 °C)-99 °F (37.2 °C)] 97.6 °F (36.4 °C)  Heart Rate:  [46-63] 62  Resp:  [16-20] 16  BP: ()/(43-81) 94/43  Flow (L/min):  [2] 2     Physical Exam:  Constitutional: No acute distress  HENT: NCAT, mucous membranes moist  Respiratory: Clear to auscultation bilaterally, respiratory effort normal   Cardiovascular: RRR, no murmurs, rubs, or gallops  Gastrointestinal: Positive bowel sounds, soft, nontender, nondistended  Musculoskeletal: No bilateral ankle edema  Psychiatric: Appropriate affect, cooperative  Neurologic: Awake, alert, follows commands, speech clear  Skin: No rashes    Results Reviewed:  LAB RESULTS:      Lab 23  0617 23  0423 23  0306 23  0428 23  0441 23  0402   WBC 5.29 4.66 5.46 5.94 5.72 8.57   HEMOGLOBIN 7.8* 8.5* 8.7* 8.8* 7.8* 8.6*   HEMATOCRIT 25.5* 27.6* 28.2* 28.5* 26.7* 29.4*   PLATELETS 116* 114* 136* 139* 162 207   NEUTROS ABS 2.13  --  2.93  --   --  5.58   IMMATURE GRANS (ABS) 0.09*  --  0.08*  --   --  0.15*   LYMPHS ABS 2.27  --  1.64  --   --  1.56   MONOS ABS 0.75  --  0.74  --   --  1.24*   EOS ABS 0.02  --  0.03  --   --  0.01   MCV 97.0 96.5 97.2* 97.9* 104.3* 104.3*   SED RATE  --   --   --   --   --  12   PROCALCITONIN  --   --   --   --   --  1.31*   LDH  --   --   --   --   --  523*         Lab 23  0617 23  0423 23  0306 23  0428 23  0128  03/28/23  1832 03/28/23  1519 03/28/23  0441 03/26/23  0402   SODIUM 141  --  145 144 142  --   --   --  143 140   POTASSIUM 4.5 4.4 3.4* 4.0 4.1  --    < >  --  3.1* 4.0   CHLORIDE 108*  --  111* 113* 112*  --   --   --  111* 107   CO2 22.0  --  22.0 22.0 19.0*  --   --   --  20.0* 15.0*   ANION GAP 11.0  --  12.0 9.0 11.0  --   --   --  12.0 18.0*   BUN 14  --  13 14 15  --   --   --  19 29*   CREATININE 0.86  --  0.66* 0.72* 0.75*  --   --   --  0.88 1.40*   EGFR 80.7  --  87.5 85.2 84.1  --   --   --  80.2 46.9*   GLUCOSE 56*  --  76 73 97  --   --   --  124* 112*   CALCIUM 8.2  --  8.2 8.2 8.1*  --   --   --  8.2 8.6   MAGNESIUM 2.4*  --  1.8 2.2 1.9  --   --   --  1.6* 1.8   PHOSPHORUS 2.5  --   --   --   --  2.5  --  1.6* 1.9* 2.9    < > = values in this interval not displayed.         Lab 03/29/23  0428 03/26/23  0402   TOTAL PROTEIN 4.5* 4.7*   ALBUMIN 2.2* 2.6*   GLOBULIN 2.3 2.1   ALT (SGPT) 5 7   AST (SGOT) 12 19   BILIRUBIN 1.2 0.8   ALK PHOS 750* 1,111*                 Lab 03/28/23  1519 03/28/23  0441 03/26/23  1020   VITAMIN B 12  --   --  >2,000*   ABO TYPING O   < >  --    RH TYPING Positive   < >  --    ANTIBODY SCREEN Negative  --   --     < > = values in this interval not displayed.         Brief Urine Lab Results  (Last result in the past 365 days)      Color   Clarity   Blood   Leuk Est   Nitrite   Protein   CREAT   Urine HCG        03/24/23 1755 Red   Turbid   Moderate (2+)   Negative   Positive   100 mg/dL (2+)                 Microbiology Results Abnormal     Procedure Component Value - Date/Time    Blood Culture - Blood, Wrist, Left [[id number]]  (Normal) Collected: 03/24/23 1640    Lab Status: Final result Specimen: Blood from Wrist, Left Updated: 03/29/23 1700     Blood Culture No growth at 5 days    Blood Culture - Blood, Arm, Left [[id number]]  (Normal) Collected: 03/24/23 1619    Lab Status: Final result Specimen: Blood from Arm, Left Updated: 03/29/23 1645     Blood Culture No growth  at 5 days    MRSA Screen, PCR (Inpatient) - Swab, Nares [[id number]]  (Normal) Collected: 03/26/23 1919    Lab Status: Final result Specimen: Swab from Nares Updated: 03/26/23 2056     MRSA PCR Negative    Narrative:      The negative predictive value of this diagnostic test is high and should only be used to consider de-escalating anti-MRSA therapy. A positive result may indicate colonization with MRSA and must be correlated clinically.  MRSA Negative    Urine Culture - Urine, Urine, Clean Catch [[id number]]  (Normal) Collected: 03/24/23 1755    Lab Status: Final result Specimen: Urine, Clean Catch Updated: 03/25/23 2324     Urine Culture No growth    COVID PRE-OP / PRE-PROCEDURE SCREENING ORDER (NO ISOLATION) - Swab, Nasal Cavity [[id number]]  (Normal) Collected: 03/24/23 1446    Lab Status: Final result Specimen: Swab from Nasal Cavity Updated: 03/24/23 1551    Narrative:      The following orders were created for panel order COVID PRE-OP / PRE-PROCEDURE SCREENING ORDER (NO ISOLATION) - Swab, Nasal Cavity.  Procedure                               Abnormality         Status                     ---------                               -----------         ------                     Respiratory Panel PCR w/...[[id number]]  Normal              Final result                 Please view results for these tests on the individual orders.    Respiratory Panel PCR w/COVID-19(SARS-CoV-2) GLEN/DAPHNE/RIVERA/PAD/COR/MAD/ZAIDA In-House, NP Swab in UTM/[facility], 3-4 HR TAT - Swab, Nasopharynx [[id number]]  (Normal) Collected: 03/24/23 1446    Lab Status: Final result Specimen: Swab from Nasopharynx Updated: 03/24/23 1551     ADENOVIRUS, PCR Not Detected     Coronavirus 229E Not Detected     Coronavirus HKU1 Not Detected     Coronavirus NL63 Not Detected     Coronavirus OC43 Not Detected     COVID19 Not Detected     Human Metapneumovirus Not Detected     Human Rhinovirus/Enterovirus Not Detected     Influenza A PCR Not Detected     Influenza B PCR  Not Detected     Parainfluenza Virus 1 Not Detected     Parainfluenza Virus 2 Not Detected     Parainfluenza Virus 3 Not Detected     Parainfluenza Virus 4 Not Detected     RSV, PCR Not Detected     Bordetella pertussis pcr Not Detected     Bordetella parapertussis PCR Not Detected     Chlamydophila pneumoniae PCR Not Detected     Mycoplasma pneumo by PCR Not Detected    Narrative:      In the setting of a positive respiratory panel with a viral infection PLUS a negative procalcitonin without other underlying concern for bacterial infection, consider observing off antibiotics or discontinuation of antibiotics and continue supportive care. If the respiratory panel is positive for atypical bacterial infection (Bordetella pertussis, Chlamydophila pneumoniae, or Mycoplasma pneumoniae), consider antibiotic de-escalation to target atypical bacterial infection.          No radiology results from the last 24 hrs    Results for orders placed during the hospital encounter of 03/24/23    Adult Transthoracic Echo Complete W/ Cont if Necessary Per Protocol    Interpretation Summary  •  Left ventricular systolic function is normal. Estimated left ventricular EF = 60%  •  The right ventricular cavity is dilated.  •  No significant valvular disease identified.  •  Estimated right ventricular systolic pressure from tricuspid regurgitation is mildly elevated (35-45 mmHg).  •  There is a trivial pericardial effusion.      Current medications:  Scheduled Meds:cholestyramine light, 1 packet, Oral, BID With Meals  Diclofenac Sodium, 2 g, Topical, 4x Daily  folic acid, 1 mg, Oral, Daily  heparin (porcine), 5,000 Units, Subcutaneous, Q8H  levothyroxine, 50 mcg, Oral, Daily  midodrine, 10 mg, Oral, TID AC  pantoprazole, 40 mg, Oral, Nightly  sodium chloride, 10 mL, Intravenous, Q12H      Continuous Infusions:   PRN Meds:.•  acetaminophen  •  Calcium Replacement - Follow Nurse / BPA Driven Protocol  •  dextrose  •  dextrose  •  glucagon  (human recombinant)  •  loperamide  •  Magnesium Cardiology Dose Replacement - Follow Nurse / BPA Driven Protocol  •  Phosphorus Replacement - Follow Nurse / BPA Driven Protocol  •  Potassium Replacement - Follow Nurse / BPA Driven Protocol  •  sodium chloride  •  sodium chloride    Assessment & Plan   Assessment & Plan     Active Hospital Problems    Diagnosis  POA   • **Septic shock [A41.9, R65.21]  Yes   • Hypothyroidism [E03.9]  Yes   • Gout [M10.9]  Yes   • Essential hypertension [I10]  Yes   • Dementia [F03.90]  Yes   • CKD stage 3, GFR 30-59 ml/min ([facility]) [N18.30]  Yes   • Sepsis due to urinary tract infection.  No organism cultured ([facility]) [A41.9, N39.0]  Yes   • Altered mental status improved [R41.82]  Yes   • Atrial fibrillation with controlled rate ([facility]) [I48.91]  Yes      Resolved Hospital Problems    Diagnosis Date Resolved POA   • Lactic acidosis [E87.20] 03/26/2023 Yes        Brief Hospital Course to date:  Jeramie Ortiz is a 89 y.o. male resident of [facility] with PMH of HTN, hypothyroidism, CKD, dementia, and gout who was admitted to [facility] ICU 3/24/23 with AMS noted to be hypotensive with AF RVR. Labs noted JUAN PABLO (sCr 1.72) and UA concerning for UTI (although culture never returned positive). He was placed on empiric Vancomycin / Zosyn and as he remained hypotensive despite IVF was placed on a Levophed infusion. ACTH test was negative for adrenal insufficiency. He was ultimately initiated on midodrine and weaned from pressors.     *Septic shock, initially admitted to ICU with pressors.  Sepsis was most likely secondary to urinary tract infection.  Treated with Zosyn which will be continued for 7 days.  Currently patient is on midodrine and blood pressure is acceptable.    *Atrial fibrillation with rapid ventricular response.  Currently heart rate is controlled.    *Acute kidney injury on chronic kidney disease, stable    *Hypothyroidism, on home dose levothyroxine.    *Weakness and  debility        Expected Discharge Location and Transportation: Possibly to rehab  Expected Discharge to be determined       DVT prophylaxis:  Medical DVT prophylaxis orders are present.     AM-PAC 6 Clicks Score (PT): 13 (03/30/23 1500)    CODE STATUS:   Code Status and Medical Interventions:   Ordered at: 03/24/23 1950     Medical Intervention Limits:    NO intubation (DNI)     Code Status (Patient has no pulse and is not breathing):    No CPR (Do Not Attempt to Resuscitate)     Medical Interventions (Patient has pulse or is breathing):    Limited Support       Rolbes Arana MD  04/01/23

## 2023-04-01 NOTE — CASE MANAGEMENT/SOCIAL WORK
Continued Stay Note  [facility]     Patient Name: Jeramie Ortiz  MRN: [id number]  Today's Date: 4/1/2023    Admit Date: 3/24/2023    Plan: [facility]   Discharge Plan     Row Name 04/01/23 1018       Plan    Plan Comments Per Devon with [facility] they will have a bed for pt on Monday 4/3. CM will continue to follow and will make transportation arrangments as appropriate.               Discharge Codes    No documentation.                     Eli Echols RN

## 2023-04-02 NOTE — PROGRESS NOTES
[facility] Medicine Services  PROGRESS NOTE    Patient Name: Jeramie Ortiz  : 12/15/1929  MRN: [id number]    Date of Admission: 3/24/2023  Primary Care Physician: Kingsley Downing MD    Subjective   Subjective     CC:  Follow-up for septic shock    HPI:  Resting in chair in no acute distress and does not have any specific complaint.  No fever or chills.  No chest pain palpitation shortness of breath.  No nausea vomiting or diarrhea or abdominal pain.    ROS:  As above    Objective   Objective     Vital Signs:   Temp:  [97.1 °F (36.2 °C)-98.1 °F (36.7 °C)] 98 °F (36.7 °C)  Heart Rate:  [44-55] 55  Resp:  [16-18] 17  BP: ()/(53-68) 100/56  Flow (L/min):  [2] 2     Physical Exam:  Constitutional: No acute distress  HENT: NCAT, mucous membranes moist  Respiratory: Clear to auscultation bilaterally, respiratory effort normal   Cardiovascular: RRR, no murmurs, rubs, or gallops  Gastrointestinal: Positive bowel sounds, soft, nontender, nondistended  Musculoskeletal: No bilateral ankle edema, edema of right arm  Psychiatric: Appropriate affect, cooperative  Neurologic: Awake, alert, follows commands, speech clear  Skin: No rashes    Results Reviewed:  LAB RESULTS:      Lab 23  0617 23  0423 23  0306 23  0428 23  0441   WBC 5.29 4.66 5.46 5.94 5.72   HEMOGLOBIN 7.8* 8.5* 8.7* 8.8* 7.8*   HEMATOCRIT 25.5* 27.6* 28.2* 28.5* 26.7*   PLATELETS 116* 114* 136* 139* 162   NEUTROS ABS 2.13  --  2.93  --   --    IMMATURE GRANS (ABS) 0.09*  --  0.08*  --   --    LYMPHS ABS 2.27  --  1.64  --   --    MONOS ABS 0.75  --  0.74  --   --    EOS ABS 0.02  --  0.03  --   --    MCV 97.0 96.5 97.2* 97.9* 104.3*         Lab 23  0617 23  0423 23  0306 23  0428 23  0128 23  1832 23  1519 23  0441   SODIUM 141  --  145 144 142  --   --   --  143   POTASSIUM 4.5 4.4 3.4* 4.0 4.1  --    < >  --  3.1*   CHLORIDE 108*  --  111*  113* 112*  --   --   --  111*   CO2 22.0  --  22.0 22.0 19.0*  --   --   --  20.0*   ANION GAP 11.0  --  12.0 9.0 11.0  --   --   --  12.0   BUN 14  --  13 14 15  --   --   --  19   CREATININE 0.86  --  0.66* 0.72* 0.75*  --   --   --  0.88   EGFR 80.7  --  87.5 85.2 84.1  --   --   --  80.2   GLUCOSE 56*  --  76 73 97  --   --   --  124*   CALCIUM 8.2  --  8.2 8.2 8.1*  --   --   --  8.2   MAGNESIUM 2.4*  --  1.8 2.2 1.9  --   --   --  1.6*   PHOSPHORUS 2.5  --   --   --   --  2.5  --  1.6* 1.9*    < > = values in this interval not displayed.         Lab 03/29/23  0428   TOTAL PROTEIN 4.5*   ALBUMIN 2.2*   GLOBULIN 2.3   ALT (SGPT) 5   AST (SGOT) 12   BILIRUBIN 1.2   ALK PHOS 750*                 Lab 03/28/23  1519   ABO TYPING O   RH TYPING Positive   ANTIBODY SCREEN Negative         Brief Urine Lab Results  (Last result in the past 365 days)      Color   Clarity   Blood   Leuk Est   Nitrite   Protein   CREAT   Urine HCG        03/24/23 1755 Red   Turbid   Moderate (2+)   Negative   Positive   100 mg/dL (2+)                 Microbiology Results Abnormal     Procedure Component Value - Date/Time    Blood Culture - Blood, Wrist, Left [[id number]]  (Normal) Collected: 03/24/23 1640    Lab Status: Final result Specimen: Blood from Wrist, Left Updated: 03/29/23 1700     Blood Culture No growth at 5 days    Blood Culture - Blood, Arm, Left [[id number]]  (Normal) Collected: 03/24/23 1619    Lab Status: Final result Specimen: Blood from Arm, Left Updated: 03/29/23 1645     Blood Culture No growth at 5 days    MRSA Screen, PCR (Inpatient) - Swab, Nares [[id number]]  (Normal) Collected: 03/26/23 1919    Lab Status: Final result Specimen: Swab from Nares Updated: 03/26/23 2056     MRSA PCR Negative    Narrative:      The negative predictive value of this diagnostic test is high and should only be used to consider de-escalating anti-MRSA therapy. A positive result may indicate colonization with MRSA and must be correlated  clinically.  MRSA Negative    Urine Culture - Urine, Urine, Clean Catch [[id number]]  (Normal) Collected: 03/24/23 1755    Lab Status: Final result Specimen: Urine, Clean Catch Updated: 03/25/23 2324     Urine Culture No growth    COVID PRE-OP / PRE-PROCEDURE SCREENING ORDER (NO ISOLATION) - Swab, Nasal Cavity [[id number]]  (Normal) Collected: 03/24/23 1446    Lab Status: Final result Specimen: Swab from Nasal Cavity Updated: 03/24/23 1551    Narrative:      The following orders were created for panel order COVID PRE-OP / PRE-PROCEDURE SCREENING ORDER (NO ISOLATION) - Swab, Nasal Cavity.  Procedure                               Abnormality         Status                     ---------                               -----------         ------                     Respiratory Panel PCR w/...[[id number]]  Normal              Final result                 Please view results for these tests on the individual orders.    Respiratory Panel PCR w/COVID-19(SARS-CoV-2) GLEN/DAPHNE/RIVERA/PAD/COR/MAD/ZAIDA In-House, NP Swab in UTM/VTM, 3-4 HR TAT - Swab, Nasopharynx [[id number]]  (Normal) Collected: 03/24/23 1446    Lab Status: Final result Specimen: Swab from Nasopharynx Updated: 03/24/23 1551     ADENOVIRUS, PCR Not Detected     Coronavirus 229E Not Detected     Coronavirus HKU1 Not Detected     Coronavirus NL63 Not Detected     Coronavirus OC43 Not Detected     COVID19 Not Detected     Human Metapneumovirus Not Detected     Human Rhinovirus/Enterovirus Not Detected     Influenza A PCR Not Detected     Influenza B PCR Not Detected     Parainfluenza Virus 1 Not Detected     Parainfluenza Virus 2 Not Detected     Parainfluenza Virus 3 Not Detected     Parainfluenza Virus 4 Not Detected     RSV, PCR Not Detected     Bordetella pertussis pcr Not Detected     Bordetella parapertussis PCR Not Detected     Chlamydophila pneumoniae PCR Not Detected     Mycoplasma pneumo by PCR Not Detected    Narrative:      In the setting of a positive respiratory  panel with a viral infection PLUS a negative procalcitonin without other underlying concern for bacterial infection, consider observing off antibiotics or discontinuation of antibiotics and continue supportive care. If the respiratory panel is positive for atypical bacterial infection (Bordetella pertussis, Chlamydophila pneumoniae, or Mycoplasma pneumoniae), consider antibiotic de-escalation to target atypical bacterial infection.          No radiology results from the last 24 hrs    Results for orders placed during the hospital encounter of 03/24/23    Adult Transthoracic Echo Complete W/ Cont if Necessary Per Protocol    Interpretation Summary  •  Left ventricular systolic function is normal. Estimated left ventricular EF = 60%  •  The right ventricular cavity is dilated.  •  No significant valvular disease identified.  •  Estimated right ventricular systolic pressure from tricuspid regurgitation is mildly elevated (35-45 mmHg).  •  There is a trivial pericardial effusion.      Current medications:  Scheduled Meds:cholestyramine light, 1 packet, Oral, BID With Meals  Diclofenac Sodium, 2 g, Topical, 4x Daily  folic acid, 1 mg, Oral, Daily  heparin (porcine), 5,000 Units, Subcutaneous, Q8H  levothyroxine, 50 mcg, Oral, Daily  midodrine, 10 mg, Oral, TID AC  pantoprazole, 40 mg, Oral, Nightly  sodium chloride, 10 mL, Intravenous, Q12H      Continuous Infusions:   PRN Meds:.•  acetaminophen  •  Calcium Replacement - Follow Nurse / BPA Driven Protocol  •  dextrose  •  dextrose  •  glucagon (human recombinant)  •  loperamide  •  Magnesium Cardiology Dose Replacement - Follow Nurse / BPA Driven Protocol  •  Phosphorus Replacement - Follow Nurse / BPA Driven Protocol  •  Potassium Replacement - Follow Nurse / BPA Driven Protocol  •  sodium chloride  •  sodium chloride    Assessment & Plan   Assessment & Plan     Active Hospital Problems    Diagnosis  POA   • **Septic shock [A41.9, R65.21]  Yes   • Hypothyroidism [E03.9]   Yes   • Gout [M10.9]  Yes   • Essential hypertension [I10]  Yes   • Dementia [F03.90]  Yes   • CKD stage 3, GFR 30-59 ml/min (Formerly [facility]) [N18.30]  Yes   • Sepsis due to urinary tract infection.  No organism cultured (Formerly [facility]) [A41.9, N39.0]  Yes   • Altered mental status improved [R41.82]  Yes   • Atrial fibrillation with controlled rate (Formerly [facility]) [I48.91]  Yes      Resolved Hospital Problems    Diagnosis Date Resolved POA   • Lactic acidosis [E87.20] 03/26/2023 Yes        Brief Hospital Course to date:  Jeramie Ortiz is a 89 y.o. male resident of [facility] with PMH of HTN, hypothyroidism, CKD, dementia, and gout who was admitted to [facility] ICU 3/24/23 with AMS noted to be hypotensive with AF RVR. Labs noted JUAN PABLO (sCr 1.72) and UA concerning for UTI (although culture never returned positive). He was placed on empiric Vancomycin / Zosyn and as he remained hypotensive despite IVF was placed on a Levophed infusion. ACTH test was negative for adrenal insufficiency. He was ultimately initiated on midodrine and weaned from pressors.     *Septic shock, initially admitted to ICU with pressors.  Sepsis was most likely secondary to urinary tract infection.  Treated with Zosyn which will be continued for 7 days.  Currently patient is on midodrine and blood pressure is acceptable.    *Atrial fibrillation with rapid ventricular response.  Currently heart rate is controlled.    *Acute kidney injury on chronic kidney disease, stable    *Hypothyroidism, on home dose levothyroxine.    *Weakness and debility    *Patient does not take her medications.  I talked to the patient about this and he agreed to take his pills.    *Right lower arm swelling.  We will try to elevate and monitor for now.  If no change will get a duplex study        Expected Discharge Location and Transportation: Possibly to rehab  Expected Discharge to be determined       DVT prophylaxis:  Medical DVT prophylaxis orders are present.     AM-PAC 6 Clicks Score (PT): 13  (04/02/23 0849)    CODE STATUS:   Code Status and Medical Interventions:   Ordered at: 03/24/23 1950     Medical Intervention Limits:    NO intubation (DNI)     Code Status (Patient has no pulse and is not breathing):    No CPR (Do Not Attempt to Resuscitate)     Medical Interventions (Patient has pulse or is breathing):    Limited Support       Robles Arana MD  04/02/23

## 2023-04-02 NOTE — PLAN OF CARE
Problem: Fall Injury Risk  Goal: Absence of Fall and Fall-Related Injury  Intervention: Identify and Manage Contributors  Recent Flowsheet Documentation  Taken 4/2/2023 1802 by Daniel Jimenez RN  Medication Review/Management: medications reviewed  Taken 4/2/2023 1458 by Daniel Jimenez RN  Medication Review/Management: medications reviewed  Taken 4/2/2023 1030 by Daniel Jimenez RN  Medication Review/Management: medications reviewed  Taken 4/2/2023 0849 by Daniel Jimenez RN  Medication Review/Management: medications reviewed  Self-Care Promotion: independence encouraged     Problem: Fall Injury Risk  Goal: Absence of Fall and Fall-Related Injury  Intervention: Promote Injury-Free Environment  Recent Flowsheet Documentation  Taken 4/2/2023 1802 by Daniel Jimenez RN  Safety Promotion/Fall Prevention:   activity supervised   safety round/check completed   room organization consistent  Taken 4/2/2023 1458 by Daniel Jimenez RN  Safety Promotion/Fall Prevention:   activity supervised   safety round/check completed  Taken 4/2/2023 1030 by Daniel Jimenez RN  Safety Promotion/Fall Prevention:   activity supervised   toileting scheduled   safety round/check completed  Taken 4/2/2023 0849 by Daniel Jimenez RN  Safety Promotion/Fall Prevention:   toileting scheduled   safety round/check completed     Problem: Adult Inpatient Plan of Care  Goal: Absence of Hospital-Acquired Illness or Injury  Outcome: Ongoing, Progressing  Intervention: Identify and Manage Fall Risk  Recent Flowsheet Documentation  Taken 4/2/2023 1802 by Daniel Jimenez RN  Safety Promotion/Fall Prevention:   activity supervised   safety round/check completed   room organization consistent  Taken 4/2/2023 1458 by Daniel Jimenez RN  Safety Promotion/Fall Prevention:   activity supervised   safety round/check completed  Taken 4/2/2023 1030 by Daniel Jimenez RN  Safety Promotion/Fall Prevention:   activity supervised   toileting scheduled   safety round/check completed  Taken  4/2/2023 0849 by Daniel Jimenez RN  Safety Promotion/Fall Prevention:   toileting scheduled   safety round/check completed  Intervention: Prevent Skin Injury  Recent Flowsheet Documentation  Taken 4/2/2023 1802 by Daniel Jimenez RN  Body Position:   weight shifting   upper extremity elevated   legs elevated   turned  Skin Protection: adhesive use limited  Taken 4/2/2023 1458 by Daniel Jimenez RN  Body Position:   left   tilted   weight shifting   position changed independently  Skin Protection:   adhesive use limited   tubing/devices free from skin contact  Taken 4/2/2023 1414 by Daniel Jimenez RN  Body Position:   right   weight shifting  Skin Protection:   adhesive use limited   tubing/devices free from skin contact  Taken 4/2/2023 1200 by Daniel Jimenez RN  Body Position: supine  Skin Protection:   adhesive use limited   tubing/devices free from skin contact  Taken 4/2/2023 1030 by Daniel Jimenez RN  Skin Protection: adhesive use limited  Taken 4/2/2023 0849 by Daniel Jimenez RN  Body Position:   left   weight shifting  Skin Protection: adhesive use limited  Intervention: Prevent and Manage VTE (Venous Thromboembolism) Risk  Recent Flowsheet Documentation  Taken 4/2/2023 1802 by Daniel Jimenez RN  Activity Management: activity adjusted per tolerance  Taken 4/2/2023 1458 by Daniel Jimenez RN  Activity Management:   activity adjusted per tolerance   unable to complete activity (see comments)  Taken 4/2/2023 1414 by Daniel Jimenez RN  Activity Management:   activity adjusted per tolerance   patient refuses activity  VTE Prevention/Management:   sequential compression devices on   bilateral  Taken 4/2/2023 1200 by Daniel Jimenez RN  Activity Management: activity adjusted per tolerance  VTE Prevention/Management:   sequential compression devices off   compression stockings off   patient refused intervention  Taken 4/2/2023 1030 by Daniel Jimenez RN  Activity Management:   activity adjusted per tolerance   patient refuses  activity  Range of Motion: active ROM (range of motion) encouraged  Taken 4/2/2023 0849 by Daniel Jimenez RN  Activity Management:   activity adjusted per tolerance   unable to complete activity (see comments)  Range of Motion: ROM (range of motion) performed  Intervention: Prevent Infection  Recent Flowsheet Documentation  Taken 4/2/2023 1802 by Daniel Jimenez RN  Infection Prevention:   single patient room provided   rest/sleep promoted  Taken 4/2/2023 1458 by Daniel Jimenez RN  Infection Prevention: rest/sleep promoted  Taken 4/2/2023 1414 by Daniel Jimenez RN  Infection Prevention: rest/sleep promoted  Taken 4/2/2023 1200 by Daniel Jimenez RN  Infection Prevention: rest/sleep promoted  Taken 4/2/2023 1030 by Daniel Jimenez RN  Infection Prevention:   single patient room provided   rest/sleep promoted   visitors restricted/screened  Taken 4/2/2023 0849 by Daniel Jimenez RN  Infection Prevention:   single patient room provided   rest/sleep promoted     Problem: Adult Inpatient Plan of Care  Goal: Absence of Hospital-Acquired Illness or Injury  Intervention: Prevent and Manage VTE (Venous Thromboembolism) Risk  Recent Flowsheet Documentation  Taken 4/2/2023 1802 by Daniel Jimenez RN  Activity Management: activity adjusted per tolerance  Taken 4/2/2023 1458 by Daniel Jimenez RN  Activity Management:   activity adjusted per tolerance   unable to complete activity (see comments)  Taken 4/2/2023 1414 by Daniel Jimenez RN  Activity Management:   activity adjusted per tolerance   patient refuses activity  VTE Prevention/Management:   sequential compression devices on   bilateral  Taken 4/2/2023 1200 by Daniel Jimenez RN  Activity Management: activity adjusted per tolerance  VTE Prevention/Management:   sequential compression devices off   compression stockings off   patient refused intervention  Taken 4/2/2023 1030 by Daniel Jimenez RN  Activity Management:   activity adjusted per tolerance   patient refuses activity  Range of  Motion: active ROM (range of motion) encouraged  Taken 4/2/2023 0849 by Daniel Jimenez RN  Activity Management:   activity adjusted per tolerance   unable to complete activity (see comments)  Range of Motion: ROM (range of motion) performed     Problem: Adult Inpatient Plan of Care  Goal: Absence of Hospital-Acquired Illness or Injury  Intervention: Prevent Infection  Recent Flowsheet Documentation  Taken 4/2/2023 1802 by Daniel Jimenez RN  Infection Prevention:   single patient room provided   rest/sleep promoted  Taken 4/2/2023 1458 by Daniel Jimenez RN  Infection Prevention: rest/sleep promoted  Taken 4/2/2023 1414 by Daniel Jimenez RN  Infection Prevention: rest/sleep promoted  Taken 4/2/2023 1200 by Daniel Jmienez RN  Infection Prevention: rest/sleep promoted  Taken 4/2/2023 1030 by Daniel Jimenze RN  Infection Prevention:   single patient room provided   rest/sleep promoted   visitors restricted/screened  Taken 4/2/2023 0849 by Daniel Jimenez RN  Infection Prevention:   single patient room provided   rest/sleep promoted     Problem: UTI (Urinary Tract Infection)  Goal: Improved Infection Symptoms  Outcome: Ongoing, Progressing  Intervention: Facilitate Optimal Urinary Elimination  Recent Flowsheet Documentation  Taken 4/2/2023 0849 by Daniel Jimenez RN  Urinary Elimination Promotion: absorbent pad/diaper use encouraged  Intervention: Prevent Infection Progression  Recent Flowsheet Documentation  Taken 4/2/2023 0849 by Daniel Jimenez RN  Bladder Spasm Management: catheter secured to abdomen/leg

## 2023-04-03 NOTE — PROGRESS NOTES
[facility] Medicine Services  PROGRESS NOTE    Patient Name: Jeramie Ortiz  : 12/15/1929  MRN: [id number]    Date of Admission: 3/24/2023  Primary Care Physician: Kingsley Downing MD    Subjective   Subjective     CC:  Follow-up for septic shock    HPI:  Resting in chair in no acute distress and does not have any specific complaint.  No fever or chills.  No chest pain palpitation shortness of breath.  No nausea vomiting or diarrhea or abdominal pain.    ROS:  As above    Objective   Objective     Vital Signs:   Temp:  [97.7 °F (36.5 °C)-98.7 °F (37.1 °C)] 98.7 °F (37.1 °C)  Heart Rate:  [75-87] 75  Resp:  [16] 16  BP: ()/(53-70) 91/55  Flow (L/min):  [2] 2     Physical Exam:  Constitutional: No acute distress  HENT: NCAT, mucous membranes moist  Respiratory: Clear to auscultation bilaterally, respiratory effort normal   Cardiovascular: RRR, no murmurs, rubs, or gallops  Gastrointestinal: Positive bowel sounds, soft, nontender, nondistended  Musculoskeletal: No bilateral ankle edema, edema of right arm  Psychiatric: Appropriate affect, cooperative  Neurologic: Awake, alert, follows commands, speech clear  Skin: No rashes    Results Reviewed:  LAB RESULTS:      Lab 23  0607 23  0617 23  0423 23  0306 23  0428   WBC 4.80 5.29 4.66 5.46 5.94   HEMOGLOBIN 8.4* 7.8* 8.5* 8.7* 8.8*   HEMATOCRIT 28.7* 25.5* 27.6* 28.2* 28.5*   PLATELETS 125* 116* 114* 136* 139*   NEUTROS ABS 2.40 2.13  --  2.93  --    IMMATURE GRANS (ABS) 0.09* 0.09*  --  0.08*  --    LYMPHS ABS 1.52 2.27  --  1.64  --    MONOS ABS 0.73 0.75  --  0.74  --    EOS ABS 0.03 0.02  --  0.03  --    .7* 97.0 96.5 97.2* 97.9*         Lab 23  0622 23  0617 23  0423 23  0306 23  0428 23  0128 23  1832 23  1519 23  0441   SODIUM 145 141  --  145 144 142  --   --   --  143   POTASSIUM 3.9 4.5 4.4 3.4* 4.0 4.1  --    < >  --  3.1*    CHLORIDE 112* 108*  --  111* 113* 112*  --   --   --  111*   CO2 22.0 22.0  --  22.0 22.0 19.0*  --   --   --  20.0*   ANION GAP 11.0 11.0  --  12.0 9.0 11.0  --   --   --  12.0   BUN 14 14  --  13 14 15  --   --   --  19   CREATININE 0.65* 0.86  --  0.66* 0.72* 0.75*  --   --   --  0.88   EGFR 87.9 80.7  --  87.5 85.2 84.1  --   --   --  80.2   GLUCOSE 60* 56*  --  76 73 97  --   --   --  124*   CALCIUM 8.2 8.2  --  8.2 8.2 8.1*  --   --   --  8.2   MAGNESIUM 2.0 2.4*  --  1.8 2.2 1.9  --   --   --  1.6*   PHOSPHORUS 2.7 2.5  --   --   --   --  2.5  --  1.6* 1.9*    < > = values in this interval not displayed.         Lab 03/29/23  0428   TOTAL PROTEIN 4.5*   ALBUMIN 2.2*   GLOBULIN 2.3   ALT (SGPT) 5   AST (SGOT) 12   BILIRUBIN 1.2   ALK PHOS 750*                 Lab 04/03/23  0606 03/28/23  1519   FOLATE >20.00  --    VITAMIN B 12 >2,000*  --    ABO TYPING  --  O   RH TYPING  --  Positive   ANTIBODY SCREEN  --  Negative         Brief Urine Lab Results  (Last result in the past 365 days)      Color   Clarity   Blood   Leuk Est   Nitrite   Protein   CREAT   Urine HCG        03/24/23 1755 Red   Turbid   Moderate (2+)   Negative   Positive   100 mg/dL (2+)                 Microbiology Results Abnormal     Procedure Component Value - Date/Time    Blood Culture - Blood, Wrist, Left [[id number]]  (Normal) Collected: 03/24/23 1640    Lab Status: Final result Specimen: Blood from Wrist, Left Updated: 03/29/23 1700     Blood Culture No growth at 5 days    Blood Culture - Blood, Arm, Left [[id number]]  (Normal) Collected: 03/24/23 1619    Lab Status: Final result Specimen: Blood from Arm, Left Updated: 03/29/23 1645     Blood Culture No growth at 5 days    MRSA Screen, PCR (Inpatient) - Swab, Nares [[id number]]  (Normal) Collected: 03/26/23 1919    Lab Status: Final result Specimen: Swab from Nares Updated: 03/26/23 2056     MRSA PCR Negative    Narrative:      The negative predictive value of this diagnostic test is high  and should only be used to consider de-escalating anti-MRSA therapy. A positive result may indicate colonization with MRSA and must be correlated clinically.  MRSA Negative    Urine Culture - Urine, Urine, Clean Catch [[id number]]  (Normal) Collected: 03/24/23 1755    Lab Status: Final result Specimen: Urine, Clean Catch Updated: 03/25/23 2324     Urine Culture No growth    COVID PRE-OP / PRE-PROCEDURE SCREENING ORDER (NO ISOLATION) - Swab, Nasal Cavity [[id number]]  (Normal) Collected: 03/24/23 1446    Lab Status: Final result Specimen: Swab from Nasal Cavity Updated: 03/24/23 1551    Narrative:      The following orders were created for panel order COVID PRE-OP / PRE-PROCEDURE SCREENING ORDER (NO ISOLATION) - Swab, Nasal Cavity.  Procedure                               Abnormality         Status                     ---------                               -----------         ------                     Respiratory Panel PCR w/...[[id number]]  Normal              Final result                 Please view results for these tests on the individual orders.    Respiratory Panel PCR w/COVID-19(SARS-CoV-2) GLEN/DAPHNE/RIVERA/PAD/COR/MAD/ZAIDA In-House, NP Swab in UTM/VTM, 3-4 HR TAT - Swab, Nasopharynx [[id number]]  (Normal) Collected: 03/24/23 1446    Lab Status: Final result Specimen: Swab from Nasopharynx Updated: 03/24/23 1551     ADENOVIRUS, PCR Not Detected     Coronavirus 229E Not Detected     Coronavirus HKU1 Not Detected     Coronavirus NL63 Not Detected     Coronavirus OC43 Not Detected     COVID19 Not Detected     Human Metapneumovirus Not Detected     Human Rhinovirus/Enterovirus Not Detected     Influenza A PCR Not Detected     Influenza B PCR Not Detected     Parainfluenza Virus 1 Not Detected     Parainfluenza Virus 2 Not Detected     Parainfluenza Virus 3 Not Detected     Parainfluenza Virus 4 Not Detected     RSV, PCR Not Detected     Bordetella pertussis pcr Not Detected     Bordetella parapertussis PCR Not Detected      Chlamydophila pneumoniae PCR Not Detected     Mycoplasma pneumo by PCR Not Detected    Narrative:      In the setting of a positive respiratory panel with a viral infection PLUS a negative procalcitonin without other underlying concern for bacterial infection, consider observing off antibiotics or discontinuation of antibiotics and continue supportive care. If the respiratory panel is positive for atypical bacterial infection (Bordetella pertussis, Chlamydophila pneumoniae, or Mycoplasma pneumoniae), consider antibiotic de-escalation to target atypical bacterial infection.          No radiology results from the last 24 hrs    Results for orders placed during the hospital encounter of 03/24/23    Adult Transthoracic Echo Complete W/ Cont if Necessary Per Protocol    Interpretation Summary  •  Left ventricular systolic function is normal. Estimated left ventricular EF = 60%  •  The right ventricular cavity is dilated.  •  No significant valvular disease identified.  •  Estimated right ventricular systolic pressure from tricuspid regurgitation is mildly elevated (35-45 mmHg).  •  There is a trivial pericardial effusion.      Current medications:  Scheduled Meds:busPIRone, 5 mg, Oral, TID  cholestyramine light, 1 packet, Oral, BID With Meals  citalopram, 10 mg, Oral, Daily  Diclofenac Sodium, 2 g, Topical, 4x Daily  folic acid, 1 mg, Oral, Daily  heparin (porcine), 5,000 Units, Subcutaneous, Q8H  levothyroxine, 50 mcg, Oral, Daily  megestrol, 800 mg, Oral, Daily  midodrine, 10 mg, Oral, TID AC  pantoprazole, 40 mg, Oral, Nightly  sodium chloride, 10 mL, Intravenous, Q12H      Continuous Infusions:   PRN Meds:.•  acetaminophen  •  Calcium Replacement - Follow Nurse / BPA Driven Protocol  •  dextrose  •  dextrose  •  glucagon (human recombinant)  •  loperamide  •  Magnesium Cardiology Dose Replacement - Follow Nurse / BPA Driven Protocol  •  Phosphorus Replacement - Follow Nurse / BPA Driven Protocol  •  Potassium  Replacement - Follow Nurse / BPA Driven Protocol  •  sodium chloride  •  sodium chloride    Assessment & Plan   Assessment & Plan     Active Hospital Problems    Diagnosis  POA   • **Septic shock [A41.9, R65.21]  Yes   • Hypothyroidism [E03.9]  Yes   • Gout [M10.9]  Yes   • Essential hypertension [I10]  Yes   • Dementia [F03.90]  Yes   • CKD stage 3, GFR 30-59 ml/min ([facility]) [N18.30]  Yes   • Sepsis due to urinary tract infection.  No organism cultured ([facility]) [A41.9, N39.0]  Yes   • Altered mental status improved [R41.82]  Yes   • Atrial fibrillation with controlled rate ([facility]) [I48.91]  Yes      Resolved Hospital Problems    Diagnosis Date Resolved POA   • Lactic acidosis [E87.20] 03/26/2023 Yes        Brief Hospital Course to date:  Jeramie Ortiz is a 89 y.o. male resident of [facility] with PMH of HTN, hypothyroidism, CKD, dementia, and gout who was admitted to [facility] ICU 3/24/23 with AMS noted to be hypotensive with AF RVR. Labs noted JUAN PABLO (sCr 1.72) and UA concerning for UTI (although culture never returned positive). He was placed on empiric Vancomycin / Zosyn and as he remained hypotensive despite IVF was placed on a Levophed infusion. ACTH test was negative for adrenal insufficiency. He was ultimately initiated on midodrine and weaned from pressors.     *Septic shock, initially admitted to ICU with pressors.  Sepsis was most likely secondary to urinary tract infection.  Treated with Zosyn which will be continued for 7 days.  Currently patient is on midodrine and blood pressure is marginal but acceptable.    *Atrial fibrillation with rapid ventricular response.  Currently heart rate is controlled.    *Acute kidney injury, currently creatinine is 0.65 and estimated GFR is 87.9.  So most likely chronic kidney disease is not a correct diagnosis.    *Hypothyroidism, on home dose levothyroxine.    *Very poor appetite with episodes of hypoglycemia.  Will start Megace to see if that helps his appetite.    * weakness and  debility    *Patient does not take her medications.  I talked to the patient about this and he agreed to take his pills.    *Right lower arm swelling.   will get a duplex study        Expected Discharge Location and Transportation: Possibly to rehab  Expected Discharge to be determined       DVT prophylaxis:  Medical DVT prophylaxis orders are present.     AM-PAC 6 Clicks Score (PT): 13 (04/03/23 4447)    CODE STATUS:   Code Status and Medical Interventions:   Ordered at: 03/24/23 1950     Medical Intervention Limits:    NO intubation (DNI)     Code Status (Patient has no pulse and is not breathing):    No CPR (Do Not Attempt to Resuscitate)     Medical Interventions (Patient has pulse or is breathing):    Limited Support       Robles Arana MD  04/03/23

## 2023-04-03 NOTE — PLAN OF CARE
Goal Outcome Evaluation:  Plan of Care Reviewed With: patient        Progress: improving  Outcome Evaluation: Patient requiring less frequent vc and less physical assistance for bed mobility and transfers this date. Good motivation and effort. Cont IP PT POC.

## 2023-04-03 NOTE — THERAPY TREATMENT NOTE
Patient Name: Jeramie Ortiz  : 12/15/1929    MRN: [id number]                              Today's Date: 4/3/2023       Admit Date: 3/24/2023    Visit Dx:     ICD-10-CM ICD-9-CM   1. Sepsis, due to unspecified organism, unspecified whether acute organ dysfunction present  A41.9 038.9     995.91   2. New onset atrial fibrillation  I48.91 427.31     Patient Active Problem List   Diagnosis   • Hypothyroidism   • Gout   • Essential hypertension   • Dementia   • CKD stage 3, GFR 30-59 ml/min ([facility])   • Septic shock   • Sepsis due to urinary tract infection.  No organism cultured ([facility])   • Altered mental status improved   • Atrial fibrillation with controlled rate ([facility])     Past Medical History:   Diagnosis Date   • CKD (chronic kidney disease) stage 3, GFR 30-59 ml/min 3/24/2023   • Dementia 3/24/2023   • Essential hypertension 3/24/2023   • Gout 3/24/2023   • Hypothyroidism 3/24/2023     Past Surgical History:   Procedure Laterality Date   • CHOLECYSTECTOMY     • PROSTATE SURGERY     • REPLACEMENT TOTAL KNEE BILATERAL        General Information     Row Name 23 1151          Physical Therapy Time and Intention    Document Type therapy note (daily note)  -LO     Mode of Treatment physical therapy  -     Row Name 23 1151          General Information    Patient Profile Reviewed yes  -LO     Existing Precautions/Restrictions fall  -LO     Row Name 23 1151          Cognition    Orientation Status (Cognition) oriented x 3  -LO     Row Name 23 1151          Safety Issues, Functional Mobility    Impairments Affecting Function (Mobility) balance;cognition;endurance/activity tolerance;strength;motor planning  -LO           User Key  (r) = Recorded By, (t) = Taken By, (c) = Cosigned By    Initials Name Provider Type    LO Sharon Mendez PT Physical Therapist               Mobility     Row Name 23 1152          Bed Mobility    Bed Mobility supine-sit  -LO     Supine-Sit [place] (Bed Mobility)  moderate assist (50% patient effort)  -LO     Assistive Device (Bed Mobility) bed rails;draw sheet;head of bed elevated  -LO     Comment, (Bed Mobility) vc for HP, physical assist at trunk and BLE  -LO     Row Name 04/03/23 1152          Transfers    Comment, (Transfers) EOB>recliner with FWW ; vc for HP with bed height elevated  -LO     Row Name 04/03/23 1152          Bed-Chair Transfer    Bed-Chair [place] (Transfers) verbal cues;minimum assist (75% patient effort);2 person assist  -LO     Assistive Device (Bed-Chair Transfers) walker, front-wheeled  -LO     Row Name 04/03/23 1152          Sit-Stand Transfer    Sit-Stand [place] (Transfers) minimum assist (75% patient effort);2 person assist;verbal cues  -LO     Assistive Device (Sit-Stand Transfers) walker, front-wheeled  -LO     Row Name 04/03/23 1152          Gait/Stairs (Locomotion)    [place] Level (Gait) contact guard;verbal cues;2 person assist  -LO     Assistive Device (Gait) walker, front-wheeled  -LO     Distance in Feet (Gait) 4  -LO     Deviations/Abnormal Patterns (Gait) bilateral deviations;base of support, narrow;adrian decreased;stride length decreased;weight shifting decreased  -LO     Bilateral Gait Deviations heel strike decreased;forward flexed posture  -LO     Comment, (Gait/Stairs) Ambulates with short shuffling pattern, forward flexed position.  -LO           User Key  (r) = Recorded By, (t) = Taken By, (c) = Cosigned By    Initials Name Provider Type    Sharon Mead PT Physical Therapist               Obj/Interventions     Row Name 04/03/23 1157          Balance    Balance Assessment sitting static balance;sitting dynamic balance;standing static balance;standing dynamic balance  -LO     Static Sitting Balance standby assist  -LO     Dynamic Sitting Balance contact guard  -LO     Position, Sitting Balance unsupported;sitting edge of bed  -LO     Static Standing Balance contact guard;2-person assist  -LO     Dynamic  Standing Balance minimal assist;2-person assist;verbal cues  -LO     Position/Device Used, Standing Balance walker, rolling  -LO     Comment, Balance FWW CGA x2 for safety in standing  -LO           User Key  (r) = Recorded By, (t) = Taken By, (c) = Cosigned By    Initials Name Provider Type    Sharon Mead, PT Physical Therapist               Goals/Plan    No documentation.                Clinical Impression     Row Name 04/03/23 1155          Pain    Pretreatment Pain Rating 0/10 - no pain  -LO     Posttreatment Pain Rating 0/10 - no pain  -LO     Row Name 04/03/23 1155          Plan of Care Review    Plan of Care Reviewed With patient  -LO     Progress improving  -LO     Outcome Evaluation Patient requiring less frequent vc and less physical assistance for bed mobility and transfers this date. Good motivation and effort. Cont IP PT POC.  -LO     Row Name 04/03/23 1155          Therapy Assessment/Plan (PT)    Rehab Potential (PT) good, to achieve stated therapy goals  -LO     Criteria for Skilled Interventions Met (PT) yes;meets criteria;skilled treatment is necessary  -LO     Therapy Frequency (PT) daily  -LO     Row Name 04/03/23 1155          Vital Signs    Pre Systolic BP Rehab 103  -LO     Pre Treatment Diastolic BP 68  -LO     Intra Systolic BP Rehab 78   -LO     Intra Treatment Diastolic BP 66  -LO     Post Systolic BP Rehab 102  -LO     Post Treatment Diastolic BP 90  -LO     Pretreatment Heart Rate (beats/min) 60  -LO     Pre SpO2 (%) 91  -LO     O2 Delivery Pre Treatment room air  -LO     O2 Delivery Intra Treatment room air  -LO     Post SpO2 (%) 90  -LO     O2 Delivery Post Treatment room air  -LO     Pre Patient Position Sitting  -LO     Intra Patient Position Standing  -LO     Post Patient Position Sitting  -LO     Row Name 04/03/23 1155          Positioning and Restraints    Pre-Treatment Position in bed  -LO     Post Treatment Position chair  -LO     In Chair notified nsg;reclined;call light  within reach;exit alarm on;waffle cushion;on mechanical lift sling;heels elevated  -LO           User Key  (r) = Recorded By, (t) = Taken By, (c) = Cosigned By    Initials Name Provider Type    Sharon Mead, PT Physical Therapist               Outcome Measures     Row Name 04/03/23 1157 04/03/23 0845       How much help from another person do you currently need...    Turning from your back to your side while in flat bed without using bedrails? 2  -LO 2  -AR    Moving from lying on back to sitting on the side of a flat bed without bedrails? 2  -LO 2  -AR    Moving to and from a bed to a chair (including a wheelchair)? 3  -LO 3  -AR    Standing up from a chair using your arms (e.g., wheelchair, bedside chair)? 2  -LO 2  -AR    Climbing 3-5 steps with a railing? 1  -LO 1  -AR    To walk in hospital room? 3  -LO 3  -AR    AM-PAC 6 Clicks Score (PT) 13  -LO 13  -AR    Highest level of mobility 4 --> Transferred to chair/commode  -LO 4 --> Transferred to chair/commode  -AR    Row Name 04/03/23 1157          Functional Assessment    Outcome Measure Options AM-PAC 6 Clicks Basic Mobility (PT)  -LO           User Key  (r) = Recorded By, (t) = Taken By, (c) = Cosigned By    Initials Name Provider Type    Sharon Mead, PT Physical Therapist    Daniel Patton, RN Registered Nurse                             Physical Therapy Education     Title: PT OT SLP Therapies (In Progress)     Topic: Physical Therapy (Done)     Point: Mobility training (Done)     Learning Progress Summary           Patient Acceptance, E, VU,NR by LO at 4/3/2023 1123    Comment: PT POC    Acceptance, E, VU,NR by HT at 3/30/2023 1501    Acceptance, E, VU,NR by HT at 3/29/2023 1403                   Point: Home exercise program (Done)     Learning Progress Summary           Patient Acceptance, E, VU,NR by LO at 4/3/2023 1123    Comment: PT POC    Acceptance, E, VU,NR by HT at 3/30/2023 1501                   Point: Body mechanics (Done)     Learning  Progress Summary           Patient Acceptance, E, VU,NR by LO at 4/3/2023 1123    Comment: PT POC    Acceptance, E, VU,NR by HT at 3/30/2023 1501    Acceptance, E, VU,NR by HT at 3/29/2023 1403                   Point: Precautions (Done)     Learning Progress Summary           Patient Acceptance, E, VU,NR by LO at 4/3/2023 1123    Comment: PT POC    Acceptance, E, VU,NR by HT at 3/30/2023 1501    Acceptance, E, VU,NR by HT at 3/29/2023 1403                               User Key     Initials Effective Dates Name Provider Type Discipline     06/16/21 -  Sharon Mendez, PT Physical Therapist PT    HT 01/12/23 -  Cheyenne Trejo PT Student PT Student PT              PT Recommendation and Plan     Plan of Care Reviewed With: patient  Progress: improving  Outcome Evaluation: Patient requiring less frequent vc and less physical assistance for bed mobility and transfers this date. Good motivation and effort. Cont IP PT POC.     Time Calculation:    PT Charges     Row Name 04/03/23 1123             Time Calculation    Start Time 1123  -LO      PT Received On 04/03/23  -LO      PT Goal Re-Cert Due Date 04/08/23  -LO         Timed Charges    26714 - Gait Training Minutes  6  -LO      93960 - PT Therapeutic Activity Minutes 20  -LO         Total Minutes    Timed Charges Total Minutes 26  -LO       Total Minutes 26  -LO            User Key  (r) = Recorded By, (t) = Taken By, (c) = Cosigned By    Initials Name Provider Type     Sharon Mendez, PT Physical Therapist              Therapy Charges for Today     Code Description Service Date Service Provider Modifiers Qty    40433914665 HC GAIT TRAINING EA 15 MIN 4/3/2023 Sharon Mendez, PT GP 1    62720569376 HC PT THERAPEUTIC ACT EA 15 MIN 4/3/2023 Sharon Mendez, PT GP 1    63472773876 HC PT THER SUPP EA 15 MIN 4/3/2023 Sharon Mendez, PT GP 2          PT G-Codes  Outcome Measure Options: AM-PAC 6 Clicks Basic Mobility (PT)  AM-PAC 6 Clicks Score (PT): 13  AM-PAC 6 Clicks Score (OT):  12  PT Discharge Summary  Anticipated Discharge Disposition (PT): skilled nursing facility    Sharon Mendez, PT  4/3/2023

## 2023-04-03 NOTE — CASE MANAGEMENT/SOCIAL WORK
Case Management Discharge Note      Final Note: To [facility], Med unit.   Nursing to call report to [phone].      I will arrange [facility] Wheelchair van.      Not medically ready for this date. Will look again for Tuesday.     Selected Continued Care - Admitted Since 3/24/2023     Destination Coordination complete.    Service Provider Selected Services Address Phone Fax Patient Preferred    [facility] [address] [id number] [phone] --          Durable Medical Equipment    No services have been selected for the patient.              Dialysis/Infusion    No services have been selected for the patient.              Home Medical Care    No services have been selected for the patient.              Therapy    No services have been selected for the patient.              Community Resources    No services have been selected for the patient.              Community & DME    No services have been selected for the patient.                       Final Discharge Disposition Code: 62 - inpatient rehab facility

## 2023-04-03 NOTE — PLAN OF CARE
Problem: Fall Injury Risk  Goal: Absence of Fall and Fall-Related Injury  Intervention: Promote Injury-Free Environment  Recent Flowsheet Documentation  Taken 4/3/2023 1622 by Daniel Jimenez RN  Safety Promotion/Fall Prevention:   activity supervised   safety round/check completed  Taken 4/3/2023 1200 by Daniel Jimenez RN  Safety Promotion/Fall Prevention:   activity supervised   safety round/check completed  Taken 4/3/2023 1039 by Daniel Jimenez RN  Safety Promotion/Fall Prevention:   activity supervised   safety round/check completed  Taken 4/3/2023 0845 by Daniel Jimenez RN  Safety Promotion/Fall Prevention:   activity supervised   safety round/check completed     Problem: Adult Inpatient Plan of Care  Goal: Absence of Hospital-Acquired Illness or Injury  Intervention: Prevent and Manage VTE (Venous Thromboembolism) Risk  Recent Flowsheet Documentation  Taken 4/3/2023 1801 by Daniel Jimenez RN  Activity Management:   back to bed   activity adjusted per tolerance  VTE Prevention/Management:   sequential compression devices on   bilateral  Taken 4/3/2023 1622 by Daniel Jimenez RN  Activity Management: activity adjusted per tolerance  Taken 4/3/2023 1419 by Daniel Jimenez RN  Activity Management:   activity adjusted per tolerance   up in chair  VTE Prevention/Management:   sequential compression devices off   patient refused intervention  Taken 4/3/2023 1200 by Daniel Jimenez RN  Activity Management: activity adjusted per tolerance  VTE Prevention/Management: other (see comments)  Taken 4/3/2023 1039 by Daniel Jimenez RN  Activity Management: activity adjusted per tolerance  VTE Prevention/Management: other (see comments)  Taken 4/3/2023 0845 by Daniel Jimenez RN  Activity Management: activity adjusted per tolerance  VTE Prevention/Management: (heparin) other (see comments)  Range of Motion: active ROM (range of motion) encouraged     Problem: Adult Inpatient Plan of Care  Goal: Optimal Comfort and Wellbeing  Intervention:  Monitor Pain and Promote Comfort  Recent Flowsheet Documentation  Taken 4/3/2023 1801 by Daniel Jimenez, RN  Pain Management Interventions: awakened for pain meds per patient request     Problem: Glycemic Control Impaired (Sepsis/Septic Shock)  Goal: Blood Glucose Level Within Desired Range  Outcome: Ongoing, Progressing

## 2023-04-04 NOTE — CONSULTS
Malnutrition Severity Assessment    Patient Name:  Jeramie Ortiz  YOB: 1929  MRN: [id number]  Admit Date:  3/24/2023    Patient meets criteria for : Severe Malnutrition    Comments:  Pt meets criteria for chronic severe malnutrition with the indicators of severe muscle wasting and subcutaneous fat loss, severe pitting (3-4+) edema to BLE, & <75% of EEN for >1 month    Malnutrition Severity Assessment  Malnutrition Type: Chronic Disease - Related Malnutrition  Malnutrition Type (last 8 hours)     Malnutrition Severity Assessment     Row Name 04/04/23 1441       Malnutrition Severity Assessment    Malnutrition Type Chronic Disease - Related Malnutrition    Row Name 04/04/23 1441       Insufficient Energy Intake     Insufficient Energy Intake Findings Severe  per son on admission assessment    Insufficient Energy Intake  <75% of est. energy requirement for > or equal to 1 month    Row Name 04/04/23 1441       Unintentional Weight Loss     Unintentional Weight Loss Findings --  unable to determine 2/2 fluid balance    Row Name 04/04/23 1441       Muscle Loss    Loss of Muscle Mass Findings Severe    Congregational Region Severe - deep hollowing/scooping, lack of muscle to touch, facial bones well defined    Clavicle Bone Region Severe - protruding prominent bone    Acromion Bone Region Severe - squared shoulders, bones, and acromion process protrusion prominent    Scapular Bone Region Severe - prominent bones, depressions easily visible between ribs, scapula, spine, shoulders    Row Name 04/04/23 1441       Fat Loss    Subcutaneous Fat Loss Findings Severe    Orbital Region  Severe - pronounced hollowness/depression, dark circles, loose saggy skin    Upper Arm Region Severe - mostly skin, very little space between folds, fingers touch    Row Name 04/04/23 1441       Fluid Accumulation (Edema)    Fluid Acumulation Findings Severe    Fluid Accumulation  Severe equals 3+ or 4+ pitting edema    Row Name 04/04/23 1441        Criteria Met (Must meet criteria for severity in at least 2 of these categories: M Wasting, Fat Loss, Fluid, Secondary Signs, Wt. Status, Intake)    Patient meets criteria for  Severe Malnutrition                Electronically signed by:  Rocio Woods MS,RD,LD  04/04/23 14:56 EDT

## 2023-04-04 NOTE — PROGRESS NOTES
[facility] Medicine Services  PROGRESS NOTE    Patient Name: Jeramei Ortiz  : 12/15/1929  MRN: [id number]    Date of Admission: 3/24/2023  Primary Care Physician: Kingsley Downing MD    Subjective   Subjective     CC:  Follow-up for septic shock    HPI:  Resting in chair in no acute distress and does not have any specific complaint.  No fever or chills.  No chest pain palpitation shortness of breath.  No nausea vomiting or diarrhea or abdominal pain.  Tells me that his abdomen appetite is just a little bit better today.    ROS:  As above    Objective   Objective     Vital Signs:   Temp:  [97.2 °F (36.2 °C)-98.1 °F (36.7 °C)] 97.9 °F (36.6 °C)  Heart Rate:  [55-73] 73  Resp:  [16-18] 18  BP: ()/(45-94) 107/45  Flow (L/min):  [2] 2     Physical Exam:  Constitutional: No acute distress  HENT: NCAT, mucous membranes moist  Respiratory: Clear to auscultation bilaterally, respiratory effort normal   Cardiovascular: RRR, no murmurs, rubs, or gallops  Gastrointestinal: Positive bowel sounds, soft, nontender, nondistended  Musculoskeletal: No bilateral ankle edema, edema of right arm, sacral bedsores  Psychiatric: Appropriate affect, cooperative  Neurologic: Awake, alert, follows commands, speech clear  Skin: No rashes    Results Reviewed:  LAB RESULTS:      Lab 23  0607 23  0617 23  0423 23  0306 23  0428   WBC 4.80 5.29 4.66 5.46 5.94   HEMOGLOBIN 8.4* 7.8* 8.5* 8.7* 8.8*   HEMATOCRIT 28.7* 25.5* 27.6* 28.2* 28.5*   PLATELETS 125* 116* 114* 136* 139*   NEUTROS ABS 2.40 2.13  --  2.93  --    IMMATURE GRANS (ABS) 0.09* 0.09*  --  0.08*  --    LYMPHS ABS 1.52 2.27  --  1.64  --    MONOS ABS 0.73 0.75  --  0.74  --    EOS ABS 0.03 0.02  --  0.03  --    .7* 97.0 96.5 97.2* 97.9*         Lab 23  0642 23  0622 23  0617 23  2019 23  0423 23  0306 23  0428 23  0128 23  1832   SODIUM 148* 145 141  --  145 144  142  --    < >   POTASSIUM 3.9 3.9 4.5 4.4 3.4* 4.0 4.1  --   --    CHLORIDE 113* 112* 108*  --  111* 113* 112*  --    < >   CO2 24.0 22.0 22.0  --  22.0 22.0 19.0*  --    < >   ANION GAP 11.0 11.0 11.0  --  12.0 9.0 11.0  --    < >   BUN 15 14 14  --  13 14 15  --    < >   CREATININE 0.65* 0.65* 0.86  --  0.66* 0.72* 0.75*  --    < >   EGFR 87.9 87.9 80.7  --  87.5 85.2 84.1  --    < >   GLUCOSE 86 60* 56*  --  76 73 97  --    < >   CALCIUM 8.4 8.2 8.2  --  8.2 8.2 8.1*  --    < >   MAGNESIUM  --  2.0 2.4*  --  1.8 2.2 1.9  --   --    PHOSPHORUS  --  2.7 2.5  --   --   --   --  2.5  --     < > = values in this interval not displayed.         Lab 03/29/23  0428   TOTAL PROTEIN 4.5*   ALBUMIN 2.2*   GLOBULIN 2.3   ALT (SGPT) 5   AST (SGOT) 12   BILIRUBIN 1.2   ALK PHOS 750*                 Lab 04/03/23  0606   FOLATE >20.00   VITAMIN B 12 >2,000*         Brief Urine Lab Results  (Last result in the past 365 days)      Color   Clarity   Blood   Leuk Est   Nitrite   Protein   CREAT   Urine HCG        03/24/23 1755 Red   Turbid   Moderate (2+)   Negative   Positive   100 mg/dL (2+)                 Microbiology Results Abnormal     Procedure Component Value - Date/Time    Blood Culture - Blood, Wrist, Left [[id number]]  (Normal) Collected: 03/24/23 1640    Lab Status: Final result Specimen: Blood from Wrist, Left Updated: 03/29/23 1700     Blood Culture No growth at 5 days    Blood Culture - Blood, Arm, Left [[id number]]  (Normal) Collected: 03/24/23 1619    Lab Status: Final result Specimen: Blood from Arm, Left Updated: 03/29/23 1645     Blood Culture No growth at 5 days    MRSA Screen, PCR (Inpatient) - Swab, Nares [[id number]]  (Normal) Collected: 03/26/23 1919    Lab Status: Final result Specimen: Swab from Nares Updated: 03/26/23 2056     MRSA PCR Negative    Narrative:      The negative predictive value of this diagnostic test is high and should only be used to consider de-escalating anti-MRSA therapy. A positive  result may indicate colonization with MRSA and must be correlated clinically.  MRSA Negative    Urine Culture - Urine, Urine, Clean Catch [[id number]]  (Normal) Collected: 03/24/23 1755    Lab Status: Final result Specimen: Urine, Clean Catch Updated: 03/25/23 2324     Urine Culture No growth    COVID PRE-OP / PRE-PROCEDURE SCREENING ORDER (NO ISOLATION) - Swab, Nasal Cavity [[id number]]  (Normal) Collected: 03/24/23 1446    Lab Status: Final result Specimen: Swab from Nasal Cavity Updated: 03/24/23 1551    Narrative:      The following orders were created for panel order COVID PRE-OP / PRE-PROCEDURE SCREENING ORDER (NO ISOLATION) - Swab, Nasal Cavity.  Procedure                               Abnormality         Status                     ---------                               -----------         ------                     Respiratory Panel PCR w/...[[id number]]  Normal              Final result                 Please view results for these tests on the individual orders.    Respiratory Panel PCR w/COVID-19(SARS-CoV-2) GLEN/DAPHNE/RIVERA/PAD/COR/MAD/ZAIDA In-House, NP Swab in UTM/VTM, 3-4 HR TAT - Swab, Nasopharynx [[id number]]  (Normal) Collected: 03/24/23 1446    Lab Status: Final result Specimen: Swab from Nasopharynx Updated: 03/24/23 1551     ADENOVIRUS, PCR Not Detected     Coronavirus 229E Not Detected     Coronavirus HKU1 Not Detected     Coronavirus NL63 Not Detected     Coronavirus OC43 Not Detected     COVID19 Not Detected     Human Metapneumovirus Not Detected     Human Rhinovirus/Enterovirus Not Detected     Influenza A PCR Not Detected     Influenza B PCR Not Detected     Parainfluenza Virus 1 Not Detected     Parainfluenza Virus 2 Not Detected     Parainfluenza Virus 3 Not Detected     Parainfluenza Virus 4 Not Detected     RSV, PCR Not Detected     Bordetella pertussis pcr Not Detected     Bordetella parapertussis PCR Not Detected     Chlamydophila pneumoniae PCR Not Detected     Mycoplasma pneumo by PCR Not  Detected    Narrative:      In the setting of a positive respiratory panel with a viral infection PLUS a negative procalcitonin without other underlying concern for bacterial infection, consider observing off antibiotics or discontinuation of antibiotics and continue supportive care. If the respiratory panel is positive for atypical bacterial infection (Bordetella pertussis, Chlamydophila pneumoniae, or Mycoplasma pneumoniae), consider antibiotic de-escalation to target atypical bacterial infection.          No radiology results from the last 24 hrs    Results for orders placed during the hospital encounter of 03/24/23    Adult Transthoracic Echo Complete W/ Cont if Necessary Per Protocol    Interpretation Summary  •  Left ventricular systolic function is normal. Estimated left ventricular EF = 60%  •  The right ventricular cavity is dilated.  •  No significant valvular disease identified.  •  Estimated right ventricular systolic pressure from tricuspid regurgitation is mildly elevated (35-45 mmHg).  •  There is a trivial pericardial effusion.      Current medications:  Scheduled Meds:busPIRone, 5 mg, Oral, TID  cholestyramine light, 1 packet, Oral, BID With Meals  citalopram, 10 mg, Oral, Daily  Diclofenac Sodium, 2 g, Topical, 4x Daily  folic acid, 1 mg, Oral, Daily  heparin (porcine), 5,000 Units, Subcutaneous, Q8H  levothyroxine, 50 mcg, Oral, Daily  megestrol, 800 mg, Oral, Daily  midodrine, 10 mg, Oral, TID AC  pantoprazole, 40 mg, Oral, Nightly  sodium chloride, 10 mL, Intravenous, Q12H      Continuous Infusions:   PRN Meds:.•  acetaminophen  •  Calcium Replacement - Follow Nurse / BPA Driven Protocol  •  dextrose  •  dextrose  •  glucagon (human recombinant)  •  loperamide  •  Magnesium Cardiology Dose Replacement - Follow Nurse / BPA Driven Protocol  •  Phosphorus Replacement - Follow Nurse / BPA Driven Protocol  •  Potassium Replacement - Follow Nurse / BPA Driven Protocol  •  sodium chloride  •  sodium  chloride  •  traMADol    Assessment & Plan   Assessment & Plan     Active Hospital Problems    Diagnosis  POA   • **Septic shock [A41.9, R65.21]  Yes   • Hypothyroidism [E03.9]  Yes   • Gout [M10.9]  Yes   • Essential hypertension [I10]  Yes   • Dementia [F03.90]  Yes   • CKD stage 3, GFR 30-59 ml/min ([facility]) [N18.30]  Yes   • Sepsis due to urinary tract infection.  No organism cultured ([facility]) [A41.9, N39.0]  Yes   • Altered mental status improved [R41.82]  Yes   • Atrial fibrillation with controlled rate ([facility]) [I48.91]  Yes      Resolved Hospital Problems    Diagnosis Date Resolved POA   • Lactic acidosis [E87.20] 03/26/2023 Yes        Brief Hospital Course to date:  Jeramie Ortiz is a 89 y.o. male resident of \[facility]\"" with PMH of HTN, hypothyroidism, CKD, dementia, and gout who was admitted to [facility] ICU 3/24/23 with AMS noted to be hypotensive with AF RVR. Labs noted JUAN PABLO (sCr 1.72) and UA concerning for UTI (although culture never returned positive). He was placed on empiric Vancomycin / Zosyn and as he remained hypotensive despite IVF was placed on a Levophed infusion. ACTH test was negative for adrenal insufficiency. He was ultimately initiated on midodrine and weaned from pressors.     *Septic shock, initially admitted to ICU with pressors.  Sepsis was most likely secondary to urinary tract infection.  Treated with Zosyn which will be continued for 7 days.  Currently patient is on midodrine and blood pressure is marginal but acceptable.    *Atrial fibrillation with rapid ventricular response.  Currently heart rate is controlled.    *Acute kidney injury, currently creatinine is 0.65 and estimated GFR is 87.9.  So,  most likely chronic kidney disease is not a correct diagnosis.    *Hypertension patient was on Vasotec at home.  However, here patient has hypotension and is currently on midodrine 10 mg p.o. 3 times daily.  Still blood pressure is very marginal.    *Hypothyroidism, on home dose  levothyroxine.    *Very poor appetite with episodes of hypoglycemia.  Started Megace on 4/3/2023 and it seems that it is helping a little.    * weakness and debility, physical therapy working with the patient.  Plan is to get him ready to go to [facility].    *Patient does not take her medications.  I talked to the patient about this and he agreed to take his pills.    *Right lower arm swelling.   will get a duplex study    NOTE: I called and talked to his son Mr. Damon called.  He told me [place] and [place] come to the hospital.  I asked him to try to talk to the patient and encouraged him to comply with his medications.    Expected Discharge Location and Transportation: Needs to go to rehab when medically ready  Expected Discharge to be determined  Expected Discharge Date and Time     Expected Discharge Date Expected Discharge Time    Apr 6, 2023            DVT prophylaxis:  Medical DVT prophylaxis orders are present.     AM-PAC 6 Clicks Score (PT): 13 (04/04/23 0828)    CODE STATUS:   Code Status and Medical Interventions:   Ordered at: 03/24/23 1950     Medical Intervention Limits:    NO intubation (DNI)     Code Status (Patient has no pulse and is not breathing):    No CPR (Do Not Attempt to Resuscitate)     Medical Interventions (Patient has pulse or is breathing):    Limited Support       Robles Arana MD  04/04/23

## 2023-04-04 NOTE — CONSULTS
Clinical Nutrition       Patient Name: Jeramie Ortiz  YOB: 1929  MRN: [id number]  Date of Encounter: 23 14:34 EDT  Admission date: 3/24/2023    Comment:    Pt meets criteria for chronic severe malnutrition with the indicators of severe muscle wasting and subcutaneous fat loss, severe pitting (3-4+) edema to BLE.     Noted pt started on Megace however has not been effective at this time. ? Appropriate for keofeed to supplement nutrition via nocturnal feeds vs assistance at meals. Hesitant to recommend nutrition support 2/2 advanced age.     Changing supplement to Mighty Shakes with goal for increased acceptance.     Reason for Visit   Follow-up protocol, Physician consult, Chronic Poor Intake      EMR Reviewed     Admission Diagnosis:  Sepsis, due to unspecified organism, unspecified whether acute organ dysfunction present [A41.9]    Problem List:    Septic shock    Hypothyroidism    Gout    Essential hypertension    Dementia    CKD stage 3, GFR 30-59 ml/min (HCC)    Sepsis due to urinary tract infection.  No organism cultured (HCC)    Altered mental status improved    Atrial fibrillation with controlled rate (HCC)  Severe Malnutrition      Anthropometric      Height: 69in  Weight: 190lb bedscale  BMI: 28.16  BMI classification: Overweight: 25.0-29.9kg/m2      Weight change: weight loss x 3-4 months per son       Last 15 Recorded Weights  View Complete Flowsheet  Weight Weight (kg) Weight (lbs) Weight Method   3/27/2023 87.1 kg 192 lb 0.3 oz Bed scale   3/26/2023 88.905 kg 196 lb -   3/26/2023 89 kg 196 lb 3.4 oz Bed scale   3/25/2023 80.3 kg 177 lb 0.5 oz Bed scale   3/24/2023 77.5 kg 170 lb 13.7 oz Bed scale   3/24/2023 98.884 kg 218 lb Stated       Assessment 3-27-23------------------------------------------------------------------------------------    Kcal goal ~1800kcal  MSJ x 1.2: 1816kcal  20kcal per kkcal      Protein goal ~96g protein    1-1.2g protein per kg:  87-105g protein        Nutrition Focused Physical Exam     Date: 4/4     Patient meets criteria for malnutrition diagnosis, see MSA note.     Reported/Observed/Food/Nutrition Related - Comments     4/4  Pt up in chair, observed untouched meal tray on bedside table. Offered to move tray table closer to eat meal, refused. MD added Megace as appetite stimulant without desirable effect at this time. Pt reports Boost Breeze causing gas and refused when offered. Observed severe pitting edema to BLE - unable to evaluate weight loss with continued inadequate PO intake.     3/30  Per RN: pt did not eat much today, is drinking boost, swallows ok, edematous    Pt sitting up in chair, on nasal cannula, off levophed, very [place], voice is hoarse, reports he does not like icecream, likes grits, soup beans, cornbread, stewed apples, bananas, pudding, menu adjusted for tomorrow    3/28  Per RN: pt drank all of boost breeze, ate some breakfast, has not had any diarrhea today     Pt sitting up in chair, in good spirits,  reports he is no worse than yesterday, son present       3/27  Per RN: pt has not been eating 2nd to diarrhea, has whole pills coming out in his stool, stool is liquid, mucous like     Cholestyramine ordered     Pt sitting up in chair, is blind and [place],  observe he has not really touched dinner tray, pt reports he is not hungry     Son reports that pt usually only eats  breakfast and lunch, he does like boost breeze       3/26  RN reports patient intermittent confused, flat affect, not wanting to participate in car, not wanting to eat or drink. RD called and spoke with son who reports patient has been having diarrhea with PO intake during the past 3-4 months. As a result he has had decreased PO intake during that time frame as he tries to prevent having BMs when he needs to participate in activities such as therapy. Nursing at [facility] has tried both supplemental fiber as well as imodium. The imodium works but results in  constipation causing patient to go from one extreme to the other. Resulting unintentional weight loss. Son reports pt does like ONS - prefers strawberry and peach flavors.     RD called and spoke with RN at [facility] and she reports patient has bouts of post-prandial diarrhea (intermittent). No wt hx available due to [facility] setting.     Current Nutrition Prescription     Diet: Regular/House Diet; Texture: Soft to Chew (NDD 3); Soft to Chew: Chopped Meat; Fluid Consistency: Thin (IDDSI 0)    Boost Breeze 3x/day    Intake: 3 days: 25% x5 meals documented     Nutrition Diagnosis     3-27-23, 3-30-23  Problem Increased nutrient needs   Etiology Poor Skin Integrity   Signs/Symptoms SKIN: coccyx contact dermatitis, possible stage 2 ulcer per WOC       Date:  4/4 Updated:  Problem Malnutrition chronic, severe   Etiology Advanced age, anorexia   Signs/Symptoms severe muscle wasting and subcutaneous fat loss, severe pitting (3-4+) edema to BLE, <75% of EEN >1 month   Status:      Actions     Follow treatment progress, Care plan reviewed, Encourage intake, Supplement provided     Monitor Per Protocol      Rocio Woods, MS,RD,LD  Time Spent: 30 min

## 2023-04-04 NOTE — PLAN OF CARE
Problem: Fall Injury Risk  Goal: Absence of Fall and Fall-Related Injury  Intervention: Promote Injury-Free Environment  Recent Flowsheet Documentation  Taken 4/4/2023 1833 by Daniel Jimenez RN  Safety Promotion/Fall Prevention:   activity supervised   toileting scheduled   safety round/check completed  Taken 4/4/2023 1636 by Daniel Jimeenz RN  Safety Promotion/Fall Prevention:   activity supervised   safety round/check completed   toileting scheduled  Taken 4/4/2023 1427 by Daniel Jimenez RN  Safety Promotion/Fall Prevention:   activity supervised   toileting scheduled   safety round/check completed   room organization consistent  Taken 4/4/2023 0828 by Daniel Jimenez RN  Safety Promotion/Fall Prevention:   activity supervised   assistive device/personal items within reach   toileting scheduled   safety round/check completed     Problem: Adult Inpatient Plan of Care  Goal: Plan of Care Review  Outcome: Ongoing, Progressing     Problem: Adult Inpatient Plan of Care  Goal: Absence of Hospital-Acquired Illness or Injury  Intervention: Identify and Manage Fall Risk  Recent Flowsheet Documentation  Taken 4/4/2023 1833 by Daniel Jimenez RN  Safety Promotion/Fall Prevention:   activity supervised   toileting scheduled   safety round/check completed  Taken 4/4/2023 1636 by Daniel Jimenez RN  Safety Promotion/Fall Prevention:   activity supervised   safety round/check completed   toileting scheduled  Taken 4/4/2023 1427 by Daniel Jimenez RN  Safety Promotion/Fall Prevention:   activity supervised   toileting scheduled   safety round/check completed   room organization consistent  Taken 4/4/2023 0828 by Daniel Jimenez RN  Safety Promotion/Fall Prevention:   activity supervised   assistive device/personal items within reach   toileting scheduled   safety round/check completed     Problem: Adult Inpatient Plan of Care  Goal: Absence of Hospital-Acquired Illness or Injury  Intervention: Prevent and Manage VTE (Venous Thromboembolism)  Risk  Recent Flowsheet Documentation  Taken 4/4/2023 1833 by Daniel Jimenez RN  Activity Management: up in chair  VTE Prevention/Management: patient refused intervention  Taken 4/4/2023 1636 by Daniel Jimenez RN  Activity Management: up in chair  VTE Prevention/Management:   sequential compression devices off   compression stockings off   patient refused intervention  Taken 4/4/2023 1427 by Daniel Jimenez RN  Activity Management:   activity minimized   up in chair  VTE Prevention/Management:   bilateral   sequential compression devices on  Taken 4/4/2023 1232 by Daniel Jimenez RN  Activity Management:   activity encouraged   up in chair  VTE Prevention/Management:   sequential compression devices off   patient refused intervention  Taken 4/4/2023 1000 by Daniel Jimenez RN  Activity Management:   ambulated to bathroom   activity encouraged  Taken 4/4/2023 0828 by Daniel Jimenez RN  Activity Management: activity minimized  VTE Prevention/Management: other (see comments)  Range of Motion: active ROM (range of motion) encouraged

## 2023-04-05 PROBLEM — E43 SEVERE MALNUTRITION: Status: ACTIVE | Noted: 2023-01-01

## 2023-04-05 NOTE — SIGNIFICANT NOTE
"Rn entered room to assist pt from chair to bed. Pt stated that he would not be taking any of his night medications. Rn then told pt that he would need to have his blood sugar checked to safely monitor pt condition. Pt stated \"You don't need to do that. I don't care what any doctor says, you aren't touching me.\" Rn educated pt- pt continued to escalate and yell at Rn.  "

## 2023-04-05 NOTE — NURSING NOTE
Report called to Jeanette HOANG at [facility].     Transported to 1700 entrance and picked up by transport without difficulty.     At time of DC, dressing site from picc removal clean dry and intact.

## 2023-04-05 NOTE — DISCHARGE PLACEMENT REQUEST
"WattsRamin ledesma (89 y.o. Male)     Date of Birth   12/15/1929    Social Security Number       Address   [address] [place] [address]    Home Phone   [phone]    MRN   [id number]       Scientology   Christianity    Marital Status                               Admission Date   3/24/23    Admission Type   Emergency    Admitting Provider   Madeline Clinton DO    Attending Provider   Madeline Clinton DO    Department, Room/Bed   [facility] 3F, S321/1       Discharge Date       Discharge Disposition   [facility] Hospital (DC - External)    Discharge Destination                               Attending Provider: Madeline Clinton DO    Allergies: No Known Allergies    Isolation: None   Infection: None   Code Status: No CPR    Ht: 175.3 cm (69\")   Wt: 86.5 kg (190 lb 11.2 oz)    Admission Cmt: None   Principal Problem: Septic shock [A41.9,R65.21]                 Active Insurance as of 3/24/2023     Primary Coverage     Payor Plan Insurance Group Employer/Plan Group    MEDICARE MEDICARE A ONLY      Payor Plan Address Payor Plan Phone Number Payor Plan Fax Number Effective Dates    [address] [phone]  12/1/1994 - None Entered    [facility] [id number]       Subscriber Name Subscriber Birth Date Member ID       RAMIN WATTS 12/15/1929 [id number]           Secondary Coverage     Payor Plan Insurance Group Employer/Plan Group    [facility] [id number]     Payor Plan Address Payor Plan Phone Number Payor Plan Fax Number Effective Dates    [address]   1/1/2023 - None Entered    [facility] [id number]       Subscriber Name Subscriber Birth Date Member ID       RAMIN WATTS 12/15/1929 [id number]                 Emergency Contacts      (Rel.) Home Phone Work Phone Mobile Phone    Nelson Watts (Son) [phone] -- [phone]            Insurance Information                MEDICARE/MEDICARE A ONLY Phone: [phone]    Subscriber: Angel Ramin " Subscriber#: [id number]    Group#: -- Precert#: --        ANTHEM BLUE CROSS/ANTHEM FEDERAL Phone: --    Subscriber: Jeramie Ortiz Subscriber#: [id number]    Group#: [id number] Precert#: --               Discharge Summary      Madeline Clinton, DO at 23 0949              [facility] Medicine Services  DISCHARGE SUMMARY    Patient Name: Jeramie Ortiz  : 12/15/1929  MRN: [id number]    Date of Admission: 3/24/2023 12:51 PM  Date of Discharge:  2023  Primary Care Physician: Kingsley Downing MD    Consults     No orders found from 2023 to 3/25/2023.          Hospital Course     Presenting Problem:   Sepsis, due to unspecified organism, unspecified whether acute organ dysfunction present [A41.9]    Active Hospital Problems    Diagnosis  POA   • **Septic shock [A41.9, R65.21]  Yes   • Severe Malnutrition (HCC) [E43]  Yes   • Hypothyroidism [E03.9]  Yes   • Gout [M10.9]  Yes   • Essential hypertension [I10]  Yes   • Dementia [F03.90]  Yes   • CKD stage 3, GFR 30-59 ml/min ([facility]) [N18.30]  Yes   • Sepsis due to urinary tract infection.  No organism cultured ([facility]) [A41.9, N39.0]  Yes   • Altered mental status improved [R41.82]  Yes   • Atrial fibrillation with controlled rate (HCC) [I48.91]  Yes      Resolved Hospital Problems    Diagnosis Date Resolved POA   • Lactic acidosis [E87.20] 2023 Yes          Hospital Course:  Jeramie Ortiz is a 89 y.o. male with history of HTN, hypothyroidism, CKD, dementia, and gout who was admitted to [facility] ICU 3/24/23 with AMS noted to be hypotensive with AF RVR. Labs noted JUAN PABLO (sCr 1.72) and UA concerning for UTI (although culture never returned positive). He was placed on empiric Vancomycin / Zosyn.  He remained hypotensive and required Levophed support while in ICU.  ACTH test was negative for adrenal insufficiency. He was ultimately initiated on midodrine and weaned from pressors.     Septic shock likely secondary to UTI  -Weaned off pressors  -Completed  Zosyn for total of 7 days  -Continue midodrine for BP support    A-fib RVR, heart rate controlled      JUAN PABLO, present on admission, resolved    Hypertension  -All BP meds on hold  -Continue midodrine    Hypothyroidism  -Continue levothyroxine    Malnutrition, decreased appetite  -Continue Megace  -Continue Accu-Cheks to monitor for hypoglycemia      Discharge Follow Up Recommendations for outpatient labs/diagnostics:  PCP 1 week post DC    Day of Discharge     HPI:   [place], feels good. No complaints    Review of Systems  Gen- No fevers, chills  CV- No chest pain, palpitations  Resp- No cough, dyspnea  GI- No N/V/D, abd pain        Vital Signs:   Temp:  [97.3 °F (36.3 °C)-97.9 °F (36.6 °C)] 97.3 °F (36.3 °C)  Heart Rate:  [56-89] 56  Resp:  [16-18] 16  BP: ()/(45-89) 104/55  Flow (L/min):  [0-2] 0      Physical Exam:  Constitutional: No acute distress, awake, alert  HENT: NCAT, mucous membranes moist  Respiratory: Clear to auscultation bilaterally, respiratory effort normal   Cardiovascular: IRRR  Gastrointestinal:Soft, nontender, nondistended  Musculoskeletal: No bilateral ankle edema  Psychiatric: Appropriate affect, cooperative  Neurologic: Oriented x 2-3, strength symmetric in all extremities, speech clear, [place]  Skin: No rashes      Pertinent  and/or Most Recent Results     LAB RESULTS:      Lab 04/03/23  0607 04/01/23  0617 03/31/23  0423 03/30/23  0306   WBC 4.80 5.29 4.66 5.46   HEMOGLOBIN 8.4* 7.8* 8.5* 8.7*   HEMATOCRIT 28.7* 25.5* 27.6* 28.2*   PLATELETS 125* 116* 114* 136*   NEUTROS ABS 2.40 2.13  --  2.93   IMMATURE GRANS (ABS) 0.09* 0.09*  --  0.08*   LYMPHS ABS 1.52 2.27  --  1.64   MONOS ABS 0.73 0.75  --  0.74   EOS ABS 0.03 0.02  --  0.03   .7* 97.0 96.5 97.2*         Lab 04/04/23  0642 04/03/23  0622 04/01/23  0617 03/31/23  2019 03/31/23  0423 03/30/23  0306   SODIUM 148* 145 141  --  145 144   POTASSIUM 3.9 3.9 4.5 4.4 3.4* 4.0   CHLORIDE 113* 112* 108*  --  111* 113*   CO2 24.0 22.0 22.0   --  22.0 22.0   ANION GAP 11.0 11.0 11.0  --  12.0 9.0   BUN 15 14 14  --  13 14   CREATININE 0.65* 0.65* 0.86  --  0.66* 0.72*   EGFR 87.9 87.9 80.7  --  87.5 85.2   GLUCOSE 86 60* 56*  --  76 73   CALCIUM 8.4 8.2 8.2  --  8.2 8.2   MAGNESIUM  --  2.0 2.4*  --  1.8 2.2   PHOSPHORUS  --  2.7 2.5  --   --   --                      Lab 04/03/23  0606   FOLATE >20.00   VITAMIN B 12 >2,000*         Brief Urine Lab Results  (Last result in the past 365 days)      Color   Clarity   Blood   Leuk Est   Nitrite   Protein   CREAT   Urine HCG        03/24/23 1755 Red   Turbid   Moderate (2+)   Negative   Positive   100 mg/dL (2+)               Microbiology Results (last 10 days)     Procedure Component Value - Date/Time    MRSA Screen, PCR (Inpatient) - Swab, Nares [[id number]]  (Normal) Collected: 03/26/23 1919    Lab Status: Final result Specimen: Swab from Nares Updated: 03/26/23 2056     MRSA PCR Negative    Narrative:      The negative predictive value of this diagnostic test is high and should only be used to consider de-escalating anti-MRSA therapy. A positive result may indicate colonization with MRSA and must be correlated clinically.  MRSA Negative          Adult Transthoracic Echo Complete W/ Cont if Necessary Per Protocol    Result Date: 3/25/2023  •  Left ventricular systolic function is normal. Estimated left ventricular EF = 60% •  The right ventricular cavity is dilated. •  No significant valvular disease identified. •  Estimated right ventricular systolic pressure from tricuspid regurgitation is mildly elevated (35-45 mmHg). •  There is a trivial pericardial effusion.     XR Chest 1 View    Result Date: 3/26/2023  XR CHEST 1 VW Date of Exam: 3/26/2023 6:06 AM EDT Indication: Possible RLL pneumonia.  Chronically elevated left hemidiaphragm. Comparison: Chest x-ray 3/25/2023 Findings: Stable cardiomediastinal silhouette within normal limits. Redemonstration of right upper extremity PICC line terminating in the  mid SVC. Similar eventration and elevation of the left hemidiaphragm. Similar hazy and streaky bibasilar opacities, likely some bibasilar atelectasis and possibly small pleural fluid. No pneumothorax.     Impression: No significant interval change. Electronically Signed: Jaswinder Mckeon  3/26/2023 8:29 AM EDT  Workstation ID: [id number]    Duplex Venous Lower Extremity - Bilateral CAR    Result Date: 3/26/2023  •  Chronic right lower extremity deep vein thrombosis noted in the posterior tibial. •  Normal left lower extremity venous duplex scan.       Results for orders placed during the hospital encounter of 03/24/23    Duplex Venous Lower Extremity - Bilateral CAR    Interpretation Summary  •  Chronic right lower extremity deep vein thrombosis noted in the posterior tibial.  •  Normal left lower extremity venous duplex scan.      Results for orders placed during the hospital encounter of 03/24/23    Duplex Venous Lower Extremity - Bilateral CAR    Interpretation Summary  •  Chronic right lower extremity deep vein thrombosis noted in the posterior tibial.  •  Normal left lower extremity venous duplex scan.      Results for orders placed during the hospital encounter of 03/24/23    Adult Transthoracic Echo Complete W/ Cont if Necessary Per Protocol    Interpretation Summary  •  Left ventricular systolic function is normal. Estimated left ventricular EF = 60%  •  The right ventricular cavity is dilated.  •  No significant valvular disease identified.  •  Estimated right ventricular systolic pressure from tricuspid regurgitation is mildly elevated (35-45 mmHg).  •  There is a trivial pericardial effusion.      Plan for Follow-up of Pending Labs/Results:     Discharge Details        Discharge Medications      New Medications      Instructions Start Date   cholestyramine light 4 g packet   4 g, Oral, 2 Times Daily With Meals      Diclofenac Sodium 1 % gel gel  Commonly known as: VOLTAREN   2 g, Topical, 4 Times Daily       megestrol 40 MG/ML suspension  Commonly known as: MEGACE   800 mg, Oral, Daily   Start Date: April 6, 2023     midodrine 10 MG tablet  Commonly known as: PROAMATINE   10 mg, Oral, 3 Times Daily Before Meals      pantoprazole 40 MG EC tablet  Commonly known as: PROTONIX   40 mg, Oral, Daily      traMADol 50 MG tablet  Commonly known as: ULTRAM   50 mg, Oral, Every 8 Hours PRN         Continue These Medications      Instructions Start Date   busPIRone 5 MG tablet  Commonly known as: BUSPAR   5 mg, Oral, 3 Times Daily      citalopram 10 MG tablet  Commonly known as: CeleXA   10 mg, Oral, Every Night at Bedtime      folic acid 400 MCG tablet  Commonly known as: FOLVITE   400 mcg, Oral, Daily      levothyroxine 50 MCG tablet  Commonly known as: SYNTHROID, LEVOTHROID   1 tablet, Oral, Daily      loperamide 2 MG capsule  Commonly known as: IMODIUM   2 capsules, Oral, Daily         Stop These Medications    albuterol sulfate  (90 Base) MCG/ACT inhaler  Commonly known as: PROVENTIL HFA;VENTOLIN HFA;PROAIR HFA     allopurinol 100 MG tablet  Commonly known as: ZYLOPRIM     calcium carbonate 600 MG tablet  Commonly known as: OS-JOANA     colchicine 0.6 MG capsule capsule     D3-50 1.25 MG (39383 UT) capsule  Generic drug: cholecalciferol     enalapril 10 MG tablet  Commonly known as: VASOTEC     Florastor 250 MG capsule  Generic drug: saccharomyces boulardii     furosemide 20 MG tablet  Commonly known as: LASIX     magnesium hydroxide 400 MG/5ML suspension  Commonly known as: MILK OF MAGNESIA     melatonin 1 MG tablet     potassium chloride 10 MEQ CR tablet     Simbrinza 1-0.2 % suspension  Generic drug: Brinzolamide-Brimonidine            No Known Allergies      Discharge Disposition:  Short Term Hospital (DC - External)    Diet:  Hospital:  Diet Order   Procedures   • Diet: Regular/House Diet; Feeding Assistance - Nursing; Texture: Soft to Chew (NDD 3); Soft to Chew: Chopped Meat; Fluid Consistency: Thin (IDDSI 0)        Activity:      Restrictions or Other Recommendations:         CODE STATUS:    Code Status and Medical Interventions:   Ordered at: 03/24/23 1950     Medical Intervention Limits:    NO intubation (DNI)     Code Status (Patient has no pulse and is not breathing):    No CPR (Do Not Attempt to Resuscitate)     Medical Interventions (Patient has pulse or is breathing):    Limited Support       No future appointments.              Madeline Clinton DO  04/05/23      Time Spent on Discharge:  I spent  31 minutes on this discharge activity which included: face-to-face encounter with the patient, reviewing the data in the system, coordination of the care with the nursing staff as well as consultants, documentation, and entering orders.            Electronically signed by Madeline Clinton DO at 04/05/23 1859

## 2023-04-05 NOTE — DISCHARGE SUMMARY
[facility] Medicine Services  DISCHARGE SUMMARY    Patient Name: Jeramie Ortiz  : 12/15/1929  MRN: [id number]    Date of Admission: 3/24/2023 12:51 PM  Date of Discharge:  2023  Primary Care Physician: Kingsley Downing MD    Consults     No orders found from 2023 to 3/25/2023.          Hospital Course     Presenting Problem:   Sepsis, due to unspecified organism, unspecified whether acute organ dysfunction present [A41.9]    Active Hospital Problems    Diagnosis  POA   • **Septic shock [A41.9, R65.21]  Yes   • Severe Malnutrition (HCC) [E43]  Yes   • Hypothyroidism [E03.9]  Yes   • Gout [M10.9]  Yes   • Essential hypertension [I10]  Yes   • Dementia [F03.90]  Yes   • CKD stage 3, GFR 30-59 ml/min (HCC) [N18.30]  Yes   • Sepsis due to urinary tract infection.  No organism cultured ([facility]) [A41.9, N39.0]  Yes   • Altered mental status improved [R41.82]  Yes   • Atrial fibrillation with controlled rate (HCC) [I48.91]  Yes      Resolved Hospital Problems    Diagnosis Date Resolved POA   • Lactic acidosis [E87.20] 2023 Yes          Hospital Course:  Jeramie Ortiz is a 89 y.o. male with history of HTN, hypothyroidism, CKD, dementia, and gout who was admitted to [facility] ICU 3/24/23 with AMS noted to be hypotensive with AF RVR. Labs noted JUAN PABLO (sCr 1.72) and UA concerning for UTI (although culture never returned positive). He was placed on empiric Vancomycin / Zosyn.  He remained hypotensive and required Levophed support while in ICU.  ACTH test was negative for adrenal insufficiency. He was ultimately initiated on midodrine and weaned from pressors.     Septic shock likely secondary to UTI  -Weaned off pressors  -Completed Zosyn for total of 7 days  -Continue midodrine for BP support    A-fib RVR, heart rate controlled      JUAN PABLO, present on admission, resolved    Hypertension  -All BP meds on hold  -Continue midodrine    Hypothyroidism  -Continue levothyroxine    Malnutrition, decreased  appetite  -Continue Megace  -Continue Accu-Cheks to monitor for hypoglycemia      Discharge Follow Up Recommendations for outpatient labs/diagnostics:  PCP 1 week post DC    Day of Discharge     HPI:   [place], feels good. No complaints    Review of Systems  Gen- No fevers, chills  CV- No chest pain, palpitations  Resp- No cough, dyspnea  GI- No N/V/D, abd pain        Vital Signs:   Temp:  [97.3 °F (36.3 °C)-97.9 °F (36.6 °C)] 97.3 °F (36.3 °C)  Heart Rate:  [56-89] 56  Resp:  [16-18] 16  BP: ()/(45-89) 104/55  Flow (L/min):  [0-2] 0      Physical Exam:  Constitutional: No acute distress, awake, alert  HENT: NCAT, mucous membranes moist  Respiratory: Clear to auscultation bilaterally, respiratory effort normal   Cardiovascular: IRRR  Gastrointestinal:Soft, nontender, nondistended  Musculoskeletal: No bilateral ankle edema  Psychiatric: Appropriate affect, cooperative  Neurologic: Oriented x 2-3, strength symmetric in all extremities, speech clear, [place]  Skin: No rashes      Pertinent  and/or Most Recent Results     LAB RESULTS:      Lab 04/03/23  0607 04/01/23 0617 03/31/23 0423 03/30/23  0306   WBC 4.80 5.29 4.66 5.46   HEMOGLOBIN 8.4* 7.8* 8.5* 8.7*   HEMATOCRIT 28.7* 25.5* 27.6* 28.2*   PLATELETS 125* 116* 114* 136*   NEUTROS ABS 2.40 2.13  --  2.93   IMMATURE GRANS (ABS) 0.09* 0.09*  --  0.08*   LYMPHS ABS 1.52 2.27  --  1.64   MONOS ABS 0.73 0.75  --  0.74   EOS ABS 0.03 0.02  --  0.03   .7* 97.0 96.5 97.2*         Lab 04/04/23  0642 04/03/23  0622 04/01/23  0617 03/31/23 2019 03/31/23 0423 03/30/23  0306   SODIUM 148* 145 141  --  145 144   POTASSIUM 3.9 3.9 4.5 4.4 3.4* 4.0   CHLORIDE 113* 112* 108*  --  111* 113*   CO2 24.0 22.0 22.0  --  22.0 22.0   ANION GAP 11.0 11.0 11.0  --  12.0 9.0   BUN 15 14 14  --  13 14   CREATININE 0.65* 0.65* 0.86  --  0.66* 0.72*   EGFR 87.9 87.9 80.7  --  87.5 85.2   GLUCOSE 86 60* 56*  --  76 73   CALCIUM 8.4 8.2 8.2  --  8.2 8.2   MAGNESIUM  --  2.0 2.4*  --  1.8  2.2   PHOSPHORUS  --  2.7 2.5  --   --   --                      Lab 04/03/23  0606   FOLATE >20.00   VITAMIN B 12 >2,000*         Brief Urine Lab Results  (Last result in the past 365 days)      Color   Clarity   Blood   Leuk Est   Nitrite   Protein   CREAT   Urine HCG        03/24/23 1755 Red   Turbid   Moderate (2+)   Negative   Positive   100 mg/dL (2+)               Microbiology Results (last 10 days)     Procedure Component Value - Date/Time    MRSA Screen, PCR (Inpatient) - Swab, Nares [[id number]]  (Normal) Collected: 03/26/23 1919    Lab Status: Final result Specimen: Swab from Nares Updated: 03/26/23 2056     MRSA PCR Negative    Narrative:      The negative predictive value of this diagnostic test is high and should only be used to consider de-escalating anti-MRSA therapy. A positive result may indicate colonization with MRSA and must be correlated clinically.  MRSA Negative          Adult Transthoracic Echo Complete W/ Cont if Necessary Per Protocol    Result Date: 3/25/2023  •  Left ventricular systolic function is normal. Estimated left ventricular EF = 60% •  The right ventricular cavity is dilated. •  No significant valvular disease identified. •  Estimated right ventricular systolic pressure from tricuspid regurgitation is mildly elevated (35-45 mmHg). •  There is a trivial pericardial effusion.     XR Chest 1 View    Result Date: 3/26/2023  XR CHEST 1 VW Date of Exam: 3/26/2023 6:06 AM EDT Indication: Possible RLL pneumonia.  Chronically elevated left hemidiaphragm. Comparison: Chest x-ray 3/25/2023 Findings: Stable cardiomediastinal silhouette within normal limits. Redemonstration of right upper extremity PICC line terminating in the mid SVC. Similar eventration and elevation of the left hemidiaphragm. Similar hazy and streaky bibasilar opacities, likely some bibasilar atelectasis and possibly small pleural fluid. No pneumothorax.     Impression: No significant interval change. Electronically  Signed: Jaswinder Mckeon  3/26/2023 8:29 AM EDT  Workstation ID: [id number]    Duplex Venous Lower Extremity - Bilateral CAR    Result Date: 3/26/2023  •  Chronic right lower extremity deep vein thrombosis noted in the posterior tibial. •  Normal left lower extremity venous duplex scan.       Results for orders placed during the hospital encounter of 03/24/23    Duplex Venous Lower Extremity - Bilateral CAR    Interpretation Summary  •  Chronic right lower extremity deep vein thrombosis noted in the posterior tibial.  •  Normal left lower extremity venous duplex scan.      Results for orders placed during the hospital encounter of 03/24/23    Duplex Venous Lower Extremity - Bilateral CAR    Interpretation Summary  •  Chronic right lower extremity deep vein thrombosis noted in the posterior tibial.  •  Normal left lower extremity venous duplex scan.      Results for orders placed during the hospital encounter of 03/24/23    Adult Transthoracic Echo Complete W/ Cont if Necessary Per Protocol    Interpretation Summary  •  Left ventricular systolic function is normal. Estimated left ventricular EF = 60%  •  The right ventricular cavity is dilated.  •  No significant valvular disease identified.  •  Estimated right ventricular systolic pressure from tricuspid regurgitation is mildly elevated (35-45 mmHg).  •  There is a trivial pericardial effusion.      Plan for Follow-up of Pending Labs/Results:     Discharge Details        Discharge Medications      New Medications      Instructions Start Date   cholestyramine light 4 g packet   4 g, Oral, 2 Times Daily With Meals      Diclofenac Sodium 1 % gel gel  Commonly known as: VOLTAREN   2 g, Topical, 4 Times Daily      megestrol 40 MG/ML suspension  Commonly known as: MEGACE   800 mg, Oral, Daily   Start Date: April 6, 2023     midodrine 10 MG tablet  Commonly known as: PROAMATINE   10 mg, Oral, 3 Times Daily Before Meals      pantoprazole 40 MG EC tablet  Commonly known as:  PROTONIX   40 mg, Oral, Daily      traMADol 50 MG tablet  Commonly known as: ULTRAM   50 mg, Oral, Every 8 Hours PRN         Continue These Medications      Instructions Start Date   busPIRone 5 MG tablet  Commonly known as: BUSPAR   5 mg, Oral, 3 Times Daily      citalopram 10 MG tablet  Commonly known as: CeleXA   10 mg, Oral, Every Night at Bedtime      folic acid 400 MCG tablet  Commonly known as: FOLVITE   400 mcg, Oral, Daily      levothyroxine 50 MCG tablet  Commonly known as: SYNTHROID, LEVOTHROID   1 tablet, Oral, Daily      loperamide 2 MG capsule  Commonly known as: IMODIUM   2 capsules, Oral, Daily         Stop These Medications    albuterol sulfate  (90 Base) MCG/ACT inhaler  Commonly known as: PROVENTIL HFA;VENTOLIN HFA;PROAIR HFA     allopurinol 100 MG tablet  Commonly known as: ZYLOPRIM     calcium carbonate 600 MG tablet  Commonly known as: OS-JOANA     colchicine 0.6 MG capsule capsule     D3-50 1.25 MG (77296 UT) capsule  Generic drug: cholecalciferol     enalapril 10 MG tablet  Commonly known as: VASOTEC     Florastor 250 MG capsule  Generic drug: saccharomyces boulardii     furosemide 20 MG tablet  Commonly known as: LASIX     magnesium hydroxide 400 MG/5ML suspension  Commonly known as: MILK OF MAGNESIA     melatonin 1 MG tablet     potassium chloride 10 MEQ CR tablet     Simbrinza 1-0.2 % suspension  Generic drug: Brinzolamide-Brimonidine            No Known Allergies      Discharge Disposition:  Short Term Hospital (DC - External)    Diet:  Hospital:  Diet Order   Procedures   • Diet: Regular/House Diet; Feeding Assistance - Nursing; Texture: Soft to Chew (NDD 3); Soft to Chew: Chopped Meat; Fluid Consistency: Thin (IDDSI 0)       Activity:      Restrictions or Other Recommendations:         CODE STATUS:    Code Status and Medical Interventions:   Ordered at: 03/24/23 1950     Medical Intervention Limits:    NO intubation (DNI)     Code Status (Patient has no pulse and is not breathing):     No CPR (Do Not Attempt to Resuscitate)     Medical Interventions (Patient has pulse or is breathing):    Limited Support       No future appointments.              Madeline Clinton,   04/05/23      Time Spent on Discharge:  I spent  31 minutes on this discharge activity which included: face-to-face encounter with the patient, reviewing the data in the system, coordination of the care with the nursing staff as well as consultants, documentation, and entering orders.

## 2023-04-05 NOTE — CASE MANAGEMENT/SOCIAL WORK
Case Management Discharge Note      Final Note: To [facility] today. Med Unit. Nursing to call report to [phone].    I am working on WC van transport. I spoke with patient and over phone with his son.     Patient will need to be in the 1700/maternity entrance on or before 2:30 for [facility] Wheelchair van transport.    Selected Continued Care - Admitted Since 3/24/2023     Destination Coordination complete.    Service Provider Selected Services Address Phone Fax Patient Preferred    [facility] [address] [address] [phone] --          Durable Medical Equipment    No services have been selected for the patient.              Dialysis/Infusion    No services have been selected for the patient.              Home Medical Care    No services have been selected for the patient.              Therapy    No services have been selected for the patient.              Community Resources    No services have been selected for the patient.              Community & DME    No services have been selected for the patient.                       Final Discharge Disposition Code: 62 - inpatient rehab facility

## 2023-04-17 PROBLEM — G93.41 METABOLIC ENCEPHALOPATHY: Status: ACTIVE | Noted: 2023-01-01

## 2023-04-17 PROBLEM — I50.32 DIASTOLIC CHF, CHRONIC: Status: ACTIVE | Noted: 2023-01-01

## 2023-04-17 PROBLEM — E16.2 HYPOGLYCEMIA: Status: ACTIVE | Noted: 2023-01-01

## 2023-04-17 PROBLEM — I95.9 HYPOTENSION: Status: ACTIVE | Noted: 2023-01-01

## 2023-04-17 PROBLEM — R41.82 AMS (ALTERED MENTAL STATUS): Status: ACTIVE | Noted: 2023-01-01

## 2023-04-17 PROBLEM — J18.9 LEFT LOWER LOBE PNEUMONIA: Status: ACTIVE | Noted: 2023-01-01

## 2023-04-17 NOTE — H&P
Psychiatric Medicine Services  HISTORY AND PHYSICAL    Patient Name: Jeramie Ortiz  : 12/15/1929  MRN: [id number]  Primary Care Physician: Kingsley Downing MD  Date of admission: 2023      Subjective   Subjective     Chief Complaint:  UTI, hypotension    HPI:  Jeramie Ortiz is a 89 y.o. male recently admitted for septic shock related to urosepsis at [facility] from 3/24/2023 to 2023 requiring ICU stay with vasopressor initiation and treatment with full course of IV Zosyn, ultimately patient convalesced and was transition to [facility] for ongoing therapy.  Patient had been participating and doing well in therapy until approximately 3 to 4 days ago when he became more lethargic.  Patient was agitated, not wanting to eat or drink as much and not wanting to participate in therapy.  Yesterday patient was noted to be slightly aggressive, which is not consistent with his baseline behavior, he was given a small dose of Ativan at [facility] which helped but patient slept most of the rest of the evening.  Poor oral intake and worsening confusion prompted ED evaluation where patient is found again to have UA consistent with UTI.  At the bedside patient is hypotensive 99/62 after receiving 2.5 L bolus in the ED, his fingerstick blood sugar is currently 57 and according to his son who is at bedside he has not had anything to eat or drink today.      Review of Systems   UTO reliably due to baseline dementia and currently with metabolic encephalopathy    Personal History     Past Medical History:   Diagnosis Date   • CKD (chronic kidney disease) stage 3, GFR 30-59 ml/min 3/24/2023   • Dementia 3/24/2023   • Diastolic CHF, chronic 2023   • Essential hypertension 3/24/2023   • Gout 3/24/2023   • Hypothyroidism 3/24/2023         Past Surgical History:   Procedure Laterality Date   • CHOLECYSTECTOMY     • PROSTATE SURGERY     • REPLACEMENT TOTAL KNEE  BILATERAL         Family History: family history is not on file.     Social History:  reports that he has quit smoking. His smoking use included cigarettes. He does not have any smokeless tobacco history on file. He reports that he does not use drugs.  Social History     Social History Narrative   • Not on file       Medications:  Available home medication information reviewed.  (Not in a hospital admission)      No Known Allergies    Objective   Objective     Vital Signs:   Temp:  [98.2 °F (36.8 °C)] 98.2 °F (36.8 °C)  Heart Rate:  [] 111  Resp:  [18] 18  BP: ()/(47-92) 106/55       Physical Exam   Constitutional: No acute distress, awake, laying in bed with eyes closed but will interact briefly, slightly groggy  HENT: NCAT, mucous membranes dry  Respiratory: Clear to auscultation bilaterally, nonlabored on 2 L, dull at bases, no rhonchi or wheezes appreciated as well as no crackles cardiovascular: RRR, no murmur  Gastrointestinal:  soft, nontender, nondistended  Musculoskeletal: Trace chronic foot and ankle peripheral edema thickened skin of venous stasis changes, normal muscle tone for age  Psychiatric: Metabolic encephalopathy but will interact briefly          LAB RESULTS:      Lab 04/17/23  1309 04/17/23  1044   WBC  --  3.96   HEMOGLOBIN  --  8.7*   HEMATOCRIT  --  30.5*   PLATELETS  --  99*   NEUTROS ABS  --  2.12   IMMATURE GRANS (ABS)  --  0.02   LYMPHS ABS  --  1.18   MONOS ABS  --  0.58   EOS ABS  --  0.03   MCV  --  104.8*   PROCALCITONIN  --  0.77*   LACTATE 1.7 2.3*   PROTIME  --  18.9*   INR  --  1.58*         Lab 04/17/23  1044   SODIUM 138   POTASSIUM 3.6   CHLORIDE 109*   CO2 15.0*   ANION GAP 14.0   BUN 16   CREATININE 0.89   EGFR 79.9   GLUCOSE 62*   CALCIUM 8.2   MAGNESIUM 1.8   TSH 2.000         Lab 04/17/23  1044   TOTAL PROTEIN 5.1*   ALBUMIN 2.4*   GLOBULIN 2.7   ALT (SGPT) 9   AST (SGOT) 23   BILIRUBIN 1.5*   ALK PHOS 1,524*         Lab 04/17/23  1309 04/17/23  1044   PROBNP   --  10,105.0*   HSTROP T 45* 44*                 Lab 04/17/23  1008   PH, ARTERIAL 7.363   PCO2, ARTERIAL 39.1   PO2 ART 16.8*   FIO2 21   HCO3 ART 22.2   BASE EXCESS ART -2.9*   CARBOXYHEMOGLOBIN 1.2     UA        1/14/2023    11:08 3/24/2023    17:55 4/17/2023    12:07   Urinalysis   Squamous Epithelial Cells, UA 3-6   0-2   3-6     Specific Gravity, UA 1.018   1.020   1.020     Ketones, UA Negative   Negative   Trace     Blood, UA Negative   Moderate (2+)   Large (3+)     Leukocytes, UA Negative   Negative   Trace     Nitrite, UA Negative   Positive   Positive     RBC, UA 3-6   Too Numerous to Count   Too Numerous to Count     WBC, UA 0-2   Too Numerous to Count   6-12     Bacteria, UA None Seen   1+   3+         Microbiology Results (last 10 days)     ** No results found for the last 240 hours. **          CT Head Without Contrast    Result Date: 4/17/2023  CT HEAD WO CONTRAST Date of Exam: 4/17/2023 9:16 AM EDT Indication: ams. Comparison: MRI brain 3/24/2023 Technique: Axial CT images were obtained of the head without contrast administration.  Reconstructed coronal and sagittal images were also obtained. Automated exposure control and iterative construction methods were used. Findings: Parenchyma:No acute intraparenchymal hemorrhage. No loss of gray-white differentiation to suggest large territory infarct. Mild parenchymal volume loss. No substantial white matter disease. No midline shift or herniation. Ventricles and extra axial spaces:Prominent ventricles and sulci secondary to volume loss. No extra axial fluid collection seen. Other: Left lens replacement. Paranasal sinuses are clear. Mastoid air cells are clear. Calvarium is intact. Intracranial atherosclerotic calcification is present.     Impression: Impression: No evidence of acute intracranial hemorrhage or large territorial infarct. Electronically Signed: Willy Harris  4/17/2023 9:41 AM EDT  Workstation ID: [id number]    XR Chest 1 View    Result  Date: 4/17/2023  XR CHEST 1 VW Date of Exam: 4/17/2023 9:43 AM EDT Indication: Altered mental status Comparison: 3/26/2023 Findings: Redemonstrated elevation of the left hemidiaphragm. There are increased bibasilar opacities, the appearance of at least moderate pleural effusion on the right, and likely bilateral lower lobe airspace disease. There is no distinct pneumothorax. Unchanged  heart and mediastinal contours.     Impression: Impression: Increased pleural effusions and basilar opacities, pneumonia versus edema pattern. Electronically Signed: Landen Sylvester  4/17/2023 10:10 AM EDT  Workstation ID: [id number]      Results for orders placed during the hospital encounter of 03/24/23    Adult Transthoracic Echo Complete W/ Cont if Necessary Per Protocol    Interpretation Summary  •  Left ventricular systolic function is normal. Estimated left ventricular EF = 60%  •  The right ventricular cavity is dilated.  •  No significant valvular disease identified.  •  Estimated right ventricular systolic pressure from tricuspid regurgitation is mildly elevated (35-45 mmHg).  •  There is a trivial pericardial effusion.      Assessment & Plan   Assessment & Plan     Active Hospital Problems    Diagnosis  POA   • **Metabolic encephalopathy [G93.41]  Yes   • Hypoglycemia [E16.2]  Yes   • Diastolic CHF, chronic [I50.32]  Yes   • Severe Malnutrition (HCC) [E43]  Yes   • Sepsis due to urinary tract infection (HCC) [A41.9, N39.0]  Yes   • Hypothyroidism [E03.9]  Yes   • Essential hypertension [I10]  Yes   • Dementia [F03.90]  Yes   • CKD stage 3, GFR 30-59 ml/min ([facility]) [N18.30]  Yes   • Atrial fibrillation with controlled rate ([facility]) [I48.91]  Yes       Sepsis secondary to UTI (AMS, hypoglycemia, hypotension, tachycardia, ill appearing)  Metabolic encephalopathy  UTI  -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, possible predisposition for recurrent UTIs  -During last hospitalization from 3/24/2023  to 4/5/2023 required ICU with vasopressor support for septic shock related to UTI (culture negative, no pathogen identified but treated with empiric Zosyn based on urosepsis protocol)  -Culture pending  -2.5 L bolus given in the ED, currently bedside blood pressure 99/62 --> repeat 1 L bolus, will give pulse dose Solu-Cortef x3 doses for probable acute illness adrenal insufficiency given patient's advanced age, prolonged illnesses, acute infection, etc  -Abx choice discussion:  Returns with acute UTI, clinically cleared prior infection on Zosyn (do not feel this is refractory infection), per sepsis protocol will use Zosyn given extended coverage based on local antibiogram data.  Cx pending.....    Chronic diastolic CHF  -Echo from last hospitalization reviewed EF 60%  -Dilated right ventricle and mildly elevated RVSP  -Elevated BNP  -Mild pulmonary edema seen on chest imaging likely extravasation secondary to hypotension and vasodilation of sepsis    HTN   Hypothyroid   Dementia   Afib   -Has HTN on his history however does not take currently any blood pressure medications, also noting he is on 3 times daily midodrine which we will continue  -Continue home Synthroid        DVT prophylaxis: Lovenox      CODE STATUS: DNR/DNI  Code Status and Medical Interventions:   Ordered at: 04/17/23 1634     Medical Intervention Limits:    NO intubation (DNI)     Code Status (Patient has no pulse and is not breathing):    No CPR (Do Not Attempt to Resuscitate)     Medical Interventions (Patient has pulse or is breathing):    Limited Support       Expected Discharge 4/20/23(     Yashira Ryan MD  04/17/23

## 2023-04-17 NOTE — ED PROVIDER NOTES
"Subjective   History of Present Illness  89-year-old male presents from [facility] to be evaluated for altered mental status.  Review of the patient's records reveals that he has a history of dementia.  He is a very limited historian.  Review of his records reveals that he was admitted to our facility from March 24 through April 5 for septic shock, presumably from UTI.  He was discharged to [facility] for rehab where he has been since time of discharge.  He presents today via EMS for altered mental status.  He was reportedly altered yesterday and \"locked himself in his room.\"  EMS reports that he was given 1 dose of Versed and was noted to be increasingly altered this morning so EMS was called and he was brought to our facility for evaluation.  He is not hypoglycemic.  He is unable to provide any further history at this time.  I will attempt to get in touch with family in order to further corroborate his history.        Review of Systems   Unable to perform ROS: Dementia   Psychiatric/Behavioral: Positive for confusion.       Past Medical History:   Diagnosis Date   • CKD (chronic kidney disease) stage 3, GFR 30-59 ml/min 3/24/2023   • Dementia 3/24/2023   • Essential hypertension 3/24/2023   • Gout 3/24/2023   • Hypothyroidism 3/24/2023       No Known Allergies    Past Surgical History:   Procedure Laterality Date   • CHOLECYSTECTOMY     • PROSTATE SURGERY     • REPLACEMENT TOTAL KNEE BILATERAL         No family history on file.    Social History     Socioeconomic History   • Marital status:    Tobacco Use   • Smoking status: Former     Types: Cigarettes   Vaping Use   • Vaping Use: Never used   Substance and Sexual Activity   • Drug use: Never           Objective   Physical Exam  Vitals and nursing note reviewed.   Constitutional:       Appearance: He is not diaphoretic.      Comments: Chronically ill-appearing elderly male, confused   HENT:      Head: Normocephalic and atraumatic.   Eyes:      " Pupils: Pupils are equal, round, and reactive to light.   Neck:      Vascular: No JVD.   Cardiovascular:      Rate and Rhythm: Normal rate and regular rhythm.      Heart sounds: Normal heart sounds. No murmur heard.    No friction rub. No gallop.   Pulmonary:      Effort: Pulmonary effort is normal. No respiratory distress.      Breath sounds: Normal breath sounds. No wheezing or rales.   Abdominal:      General: Bowel sounds are normal. There is no distension.      Palpations: Abdomen is soft. There is no mass.      Tenderness: There is no abdominal tenderness. There is no guarding.   Musculoskeletal:         General: Normal range of motion.      Cervical back: Neck supple.   Skin:     General: Skin is warm and dry.      Coloration: Skin is not pale.      Findings: No erythema or rash.   Neurological:      Comments: Patient appears confused, moving all 4 extremities, alert to person only, following simple commands   Psychiatric:         Thought Content: Thought content normal.         Judgment: Judgment normal.      Comments: Unable to adequately evaluate         Critical Care  Performed by: Rajesh Otero MD  Authorized by: Rajesh Otero MD     Critical care provider statement:     Critical care time (minutes):  37    Critical care was necessary to treat or prevent imminent or life-threatening deterioration of the following conditions:  Sepsis    Critical care was time spent personally by me on the following activities:  Development of treatment plan with patient or surrogate, evaluation of patient's response to treatment, examination of patient, obtaining history from patient or surrogate, ordering and performing treatments and interventions, ordering and review of laboratory studies, ordering and review of radiographic studies, pulse oximetry, re-evaluation of patient's condition and review of old charts               ED Course  ED Course as of 04/17/23 1412   Mon Apr 17, 2023   0921 89-year-old  "male presents from [facility] to be evaluated for altered mental status.  Review of the patient's records reveals that he has a history of dementia.  He was admitted to our facility from March 24 through April 5 for septic shock.  He has been at [facility] since being discharged from our facility.  He reportedly had altered mental status yesterday at [facility] and \"locked himself in his room.\"  He was given Versed and was noted to be increasingly altered this morning, prompting a call to EMS.  On arrival to the ED, the patient is chronically ill-appearing.  He is confused.  He is able to follow simple commands and is moving all 4 extremities.  Differential diagnosis is quite broad.  We will obtain labs and imaging, and we will reassess following initial interventions. [DD]   0935 CT head is negative for emergent intracranial process. [DD]   1004 I personally and independently viewed the patient's x-ray images myself, and I am in agreement with the radiologist's reading for final interpretation.   [DD]   1014 HCAP coverage initiated. [DD]   1014 Blood and urine cultures obtained.  We will seek admission to the hospital. [DD]   1015 Patient borderline hypotensive as well.  30 cc/kg crystalloid bolus initiated. [DD]   1147 Upon reevaluation, the patient looks improved from a hemodynamic standpoint.  He is normotensive.  I feel that he is stable for the floor at this point.  EKG revealed atrial fibrillation.  His heart rates are ranging from .  Rate control withheld.  Anticoagulation withheld after reviewing prior records.  I discussed the patient's case with Dr. Swain, and the patient will be admitted under her care for further evaluation and treatment.  The patient is hemodynamically stable at this time. [DD]      ED Course User Index  [DD] Rajesh Otero MD                                       Recent Results (from the past 24 hour(s))   Blood Gas, Arterial With Co-Ox    Collection Time: 04/17/23 " 10:08 AM    Specimen: Arterial Blood   Result Value Ref Range    Site Left Radial     Roberto's Test N/A     pH, Arterial 7.363 7.350 - 7.450 pH units    pCO2, Arterial 39.1 35.0 - 45.0 mm Hg    pO2, Arterial 16.8 (C) 83.0 - 108.0 mm Hg    HCO3, Arterial 22.2 20.0 - 26.0 mmol/L    Base Excess, Arterial -2.9 (L) 0.0 - 2.0 mmol/L    Hemoglobin, Blood Gas 9.5 (L) 13.5 - 17.5 g/dL    Hematocrit, Blood Gas 29.3 (L) 38.0 - 51.0 %    Oxyhemoglobin 15.9 (C) 94 - 99 %    Methemoglobin 0.60 0.00 - 1.50 %    Carboxyhemoglobin 1.2 0 - 2 %    CO2 Content 23.4 22 - 33 mmol/L    Temperature 37.0 C    Barometric Pressure for Blood Gas      Modality Room Air     FIO2 21 %    Rate 0 Breaths/minute    PIP 0 cmH2O    IPAP 0     EPAP 0     Note      Notified Magalie VELAZQUEZ RN     Notified By [id number]     Notified Time 04/17/2023 10:08     pH, Temp Corrected 7.363 pH Units    pCO2, Temperature Corrected 39.1 35 - 48 mm Hg    pO2, Temperature Corrected 16.8 (L) 83 - 108 mm Hg   Comprehensive Metabolic Panel    Collection Time: 04/17/23 10:44 AM    Specimen: Arm, Right; Blood   Result Value Ref Range    Glucose 62 (L) 65 - 99 mg/dL    BUN 16 8 - 23 mg/dL    Creatinine 0.89 0.76 - 1.27 mg/dL    Sodium 138 136 - 145 mmol/L    Potassium 3.6 3.5 - 5.2 mmol/L    Chloride 109 (H) 98 - 107 mmol/L    CO2 15.0 (L) 22.0 - 29.0 mmol/L    Calcium 8.2 8.2 - 9.6 mg/dL    Total Protein 5.1 (L) 6.0 - 8.5 g/dL    Albumin 2.4 (L) 3.5 - 5.2 g/dL    ALT (SGPT) 9 1 - 41 U/L    AST (SGOT) 23 1 - 40 U/L    Alkaline Phosphatase 1,524 (H) 39 - 117 U/L    Total Bilirubin 1.5 (H) 0.0 - 1.2 mg/dL    Globulin 2.7 gm/dL    A/G Ratio 0.9 g/dL    BUN/Creatinine Ratio 18.0 7.0 - 25.0    Anion Gap 14.0 5.0 - 15.0 mmol/L    eGFR 79.9 >60.0 mL/min/1.73   Protime-INR    Collection Time: 04/17/23 10:44 AM    Specimen: Arm, Right; Blood   Result Value Ref Range    Protime 18.9 (H) 11.4 - 14.4 Seconds    INR 1.58 (H) 0.84 - 1.13   BNP    Collection Time: 04/17/23 10:44 AM    Specimen:  Arm, Right; Blood   Result Value Ref Range    proBNP 10,105.0 (H) 0.0 - 1,800.0 pg/mL   High Sensitivity Troponin T    Collection Time: 04/17/23 10:44 AM    Specimen: Arm, Right; Blood   Result Value Ref Range    HS Troponin T 44 (H) <15 ng/L   Lactic Acid, Plasma    Collection Time: 04/17/23 10:44 AM    Specimen: Arm, Right; Blood   Result Value Ref Range    Lactate 2.3 (C) 0.5 - 2.0 mmol/L   Procalcitonin    Collection Time: 04/17/23 10:44 AM    Specimen: Arm, Right; Blood   Result Value Ref Range    Procalcitonin 0.77 (H) 0.00 - 0.25 ng/mL   Acetaminophen Level    Collection Time: 04/17/23 10:44 AM    Specimen: Arm, Right; Blood   Result Value Ref Range    Acetaminophen <5.0 0.0 - 30.0 mcg/mL   Ethanol    Collection Time: 04/17/23 10:44 AM    Specimen: Arm, Right; Blood   Result Value Ref Range    Ethanol <10 0 - 10 mg/dL   Salicylate Level    Collection Time: 04/17/23 10:44 AM    Specimen: Arm, Right; Blood   Result Value Ref Range    Salicylate <0.3 <=30.0 mg/dL   CK    Collection Time: 04/17/23 10:44 AM    Specimen: Arm, Right; Blood   Result Value Ref Range    Creatine Kinase 65 20 - 200 U/L   Ammonia    Collection Time: 04/17/23 10:44 AM    Specimen: Arm, Right; Blood   Result Value Ref Range    Ammonia 46 16 - 60 umol/L   Magnesium    Collection Time: 04/17/23 10:44 AM    Specimen: Arm, Right; Blood   Result Value Ref Range    Magnesium 1.8 1.7 - 2.3 mg/dL   TSH    Collection Time: 04/17/23 10:44 AM    Specimen: Arm, Right; Blood   Result Value Ref Range    TSH 2.000 0.270 - 4.200 uIU/mL   T4, Free    Collection Time: 04/17/23 10:44 AM    Specimen: Arm, Right; Blood   Result Value Ref Range    Free T4 1.02 0.93 - 1.70 ng/dL   CBC Auto Differential    Collection Time: 04/17/23 10:44 AM    Specimen: Arm, Right; Blood   Result Value Ref Range    WBC 3.96 3.40 - 10.80 10*3/mm3    RBC 2.91 (L) 4.14 - 5.80 10*6/mm3    Hemoglobin 8.7 (L) 13.0 - 17.7 g/dL    Hematocrit 30.5 (L) 37.5 - 51.0 %    .8 (H) 79.0 -  97.0 fL    MCH 29.9 26.6 - 33.0 pg    MCHC 28.5 (L) 31.5 - 35.7 g/dL    RDW 25.6 (H) 12.3 - 15.4 %    RDW-.7 (H) 37.0 - 54.0 fl    MPV 9.1 6.0 - 12.0 fL    Platelets 99 (L) 140 - 450 10*3/mm3    Neutrophil % 53.5 42.7 - 76.0 %    Lymphocyte % 29.8 19.6 - 45.3 %    Monocyte % 14.6 (H) 5.0 - 12.0 %    Eosinophil % 0.8 0.3 - 6.2 %    Basophil % 0.8 0.0 - 1.5 %    Immature Grans % 0.5 0.0 - 0.5 %    Neutrophils, Absolute 2.12 1.70 - 7.00 10*3/mm3    Lymphocytes, Absolute 1.18 0.70 - 3.10 10*3/mm3    Monocytes, Absolute 0.58 0.10 - 0.90 10*3/mm3    Eosinophils, Absolute 0.03 0.00 - 0.40 10*3/mm3    Basophils, Absolute 0.03 0.00 - 0.20 10*3/mm3    Immature Grans, Absolute 0.02 0.00 - 0.05 10*3/mm3    nRBC 0.5 (H) 0.0 - 0.2 /100 WBC   Scan Slide    Collection Time: 04/17/23 10:44 AM    Specimen: Arm, Right; Blood   Result Value Ref Range    Acanthocytes Slight/1+ None Seen    Anisocytosis Large/3+ None Seen    [place] Cells Slight/1+ None Seen    Dacrocytes Slight/1+ None Seen    Hypochromia Slight/1+ None Seen    Macrocytes Slight/1+ None Seen    Ovalocytes Slight/1+ None Seen    Target Cells Slight/1+ None Seen    WBC Morphology Normal Normal    Large Platelets Slight/1+ None Seen   ECG 12 Lead Altered Mental Status    Collection Time: 04/17/23 11:27 AM   Result Value Ref Range    QT Interval 290 ms    QTC Interval 394 ms   Urinalysis With Culture If Indicated - Urine, Clean Catch    Collection Time: 04/17/23 12:07 PM    Specimen: Urine, Clean Catch   Result Value Ref Range    Color, UA Yellow Yellow, Straw    Appearance, UA Clear Clear    pH, UA 5.5 5.0 - 8.0    Specific Gravity, UA 1.020 1.005 - 1.030    Glucose, UA Negative Negative    Ketones, UA Trace (A) Negative    Bilirubin, UA Small (1+) (A) Negative    Blood, UA Large (3+) (A) Negative    Protein, UA 30 mg/dL (1+) (A) Negative    Leuk Esterase, UA Trace (A) Negative    Nitrite, UA Positive (A) Negative    Urobilinogen, UA 0.2 E.U./dL 0.2 - 1.0 E.U./dL    Urine Drug Screen - Urine, Clean Catch    Collection Time: 04/17/23 12:07 PM    Specimen: Urine, Clean Catch   Result Value Ref Range    THC, Screen, Urine Negative Negative    Phencyclidine (PCP), Urine Negative Negative    Cocaine Screen, Urine Negative Negative    Methamphetamine, Ur Negative Negative    Opiate Screen Negative Negative    Amphetamine Screen, Urine Negative Negative    Benzodiazepine Screen, Urine Positive (A) Negative    Tricyclic Antidepressants Screen Negative Negative    Methadone Screen, Urine Negative Negative    Barbiturates Screen, Urine Negative Negative    Oxycodone Screen, Urine Negative Negative    Propoxyphene Screen Negative Negative    Buprenorphine, Screen, Urine Negative Negative   Urinalysis, Microscopic Only - Urine, Clean Catch    Collection Time: 04/17/23 12:07 PM    Specimen: Urine, Clean Catch   Result Value Ref Range    RBC, UA Too Numerous to Count (A) None Seen, 0-2 /HPF    WBC, UA 6-12 (A) None Seen, 0-2 /HPF    Bacteria, UA 3+ (A) None Seen, Trace /HPF    Squamous Epithelial Cells, UA 3-6 (A) None Seen, 0-2 /HPF    Hyaline Casts, UA 0-6 0 - 6 /LPF    Methodology Automated Microscopy    STAT Lactic Acid, Reflex    Collection Time: 04/17/23  1:09 PM    Specimen: Arm, Right; Blood   Result Value Ref Range    Lactate 1.7 0.5 - 2.0 mmol/L   High Sensitivity Troponin T 2Hr    Collection Time: 04/17/23  1:09 PM    Specimen: Arm, Right; Blood   Result Value Ref Range    HS Troponin T 45 (H) <15 ng/L    Troponin T Delta 1 >=-4 - <+4 ng/L     Note: In addition to lab results from this visit, the labs listed above may include labs taken at another facility or during a different encounter within the last 24 hours. Please correlate lab times with ED admission and discharge times for further clarification of the services performed during this visit.    XR Chest 1 View   Final Result   Impression:   Increased pleural effusions and basilar opacities, pneumonia versus edema pattern.       Electronically Signed: Landen Sylvester     4/17/2023 10:10 AM EDT     Workstation ID: [id number]      CT Head Without Contrast   Final Result   Impression:   No evidence of acute intracranial hemorrhage or large territorial infarct.      Electronically Signed: Willy Harris     4/17/2023 9:41 AM EDT     Workstation ID: [id number]        Vitals:    04/17/23 1313 04/17/23 1331 04/17/23 1345 04/17/23 1357   BP: (!) 89/70  115/73    Pulse: (!) 130  (!) 124    Resp:       Temp:       TempSrc:       SpO2:  91%  91%   Weight:       Height:         Medications   sodium chloride 0.9 % bolus 2,586 mL (2,586 mL Intravenous New Bag 4/17/23 1115)   vancomycin 1750 mg/500 mL 0.9% NS IVPB ([facility]) (0 mg Intravenous Stopped 4/17/23 1350)   piperacillin-tazobactam (ZOSYN) 4.5 g in iso-osmotic dextrose 100 mL IVPB (premix) (0 g Intravenous Stopped 4/17/23 1307)     ECG/EMG Results (last 24 hours)     Procedure Component Value Units Date/Time    ECG 12 Lead Altered Mental Status [[id number]] Collected: 04/17/23 1127     Updated: 04/17/23 1404     QT Interval 290 ms      QTC Interval 394 ms     Narrative:      Test Reason : Altered Mental Status  Blood Pressure :   */*   mmHG  Vent. Rate : 111 BPM     Atrial Rate : 153 BPM     P-R Int :   * ms          QRS Dur :  74 ms      QT Int : 290 ms       P-R-T Axes :   * -39 226 degrees     QTc Int : 394 ms    Atrial fibrillation with rapid ventricular response  Left axis deviation  Low voltage QRS  Inferior infarct (cited on or before 26-MAR-2023)  Cannot rule out Anterior infarct (cited on or before 26-MAR-2023)  Abnormal ECG  Confirmed by MD Shanna, Nelson ([id number]) on 4/17/2023 2:04:01 PM    Referred By: SHANNA           Confirmed By: Nelson Otero MD        ECG 12 Lead Altered Mental Status   Final Result   Test Reason : Altered Mental Status   Blood Pressure :   */*   mmHG   Vent. Rate : 111 BPM     Atrial Rate : 153 BPM      P-R Int :   * ms          QRS Dur :  74 ms       QT Int : 290 ms        P-R-T Axes :   * -39 226 degrees      QTc Int : 394 ms      Atrial fibrillation with rapid ventricular response   Left axis deviation   Low voltage QRS   Inferior infarct (cited on or before 26-MAR-2023)   Cannot rule out Anterior infarct (cited on or before 26-MAR-2023)   Abnormal ECG   Confirmed by MD Otero Michael ([id number]) on 4/17/2023 2:04:01 PM      Referred By: [facility]           Confirmed By: Nelson Otero MD                 MDM    Final diagnoses:   Sepsis secondary to UTI   HCAP (healthcare-associated pneumonia)   Atrial fibrillation with RVR   Anemia, unspecified type   History of dementia   Confusion       ED Disposition  ED Disposition     ED Disposition   Decision to Admit    Condition   --    Comment   Level of Care: Telemetry [5]   Diagnosis: AMS (altered mental status) [7026570]   Certification: I Certify That Inpatient Hospital Services Are Medically Necessary For Greater Than 2 Midnights               No follow-up provider specified.       Medication List      No changes were made to your prescriptions during this visit.          Rajesh Otero MD  04/17/23 6176

## 2023-04-17 NOTE — NURSING NOTE
Patient pulled off 02, pulled out IV, Sats 70's RespIr called unable to get pleth to . Patient combative. Rapid response called.  New orders in progress.  2L NC @ this time Sats 98%.

## 2023-04-18 NOTE — CASE MANAGEMENT/SOCIAL WORK
Discharge Planning Assessment  [facility]     Patient Name: Jeramie Ortiz  MRN: [id number]  Today's Date: 4/18/2023    Admit Date: 4/17/2023    Plan: SNF to LTC   Discharge Needs Assessment     Row Name 04/18/23 1048       Living Environment    People in Home facility resident    Current Living Arrangements assisted living facility    Provides Primary Care For no one, unable/limited ability to care for self    Family Caregiver if Needed child(jay), adult    Family Caregiver Names Nelson Ortiz - son    Quality of Family Relationships helpful;involved;supportive    Able to Return to Prior Arrangements other (see comments)  TBD       Transition Planning    Patient/Family Anticipates Transition to inpatient rehabilitation facility    Transportation Anticipated health plan transportation       Discharge Needs Assessment    Readmission Within the Last 30 Days previous discharge plan unsuccessful    Equipment Currently Used at Home wheelchair;walker, rolling    Concerns to be Addressed discharge planning;adjustment to diagnosis/illness;cognitive/perceptual    Anticipated Changes Related to Illness inability to care for self    Outpatient/Agency/Support Group Needs inpatient rehabilitation facility;skilled nursing facility    Discharge Facility/Level of Care Needs nursing facility, skilled    Current Discharge Risk chronically ill;cognitively impaired;dependent with mobility/activities of daily living               Discharge Plan     Row Name 04/18/23 1051       Plan    Plan SNF to LTC    Plan Comments Mr. Ortiz has been admitted to [facility] from [facility] for Metabolic encephalopathy, Hypoglycemia and Chronic Congestive Heart Failure.  Chart reviewed.  He was discharged from [facility] to [facility] on 4/5 and prior to previous [facility] admission he had lived at [facility]. He now requires assistance and is dependent with most activities of daily living.  Mr. Ortiz's son Nelson was hopeful his dad could  return to [facility] after his rehab was completed.    Per Yolanda with  [facility], he may be more appropriate for skilled nursing rehab with transition to long term care.  CM will contact Nelson and discuss the ongoing discharge plan and options available to him.    Final Discharge Disposition Code 03 - skilled nursing facility (SNF)              Continued Care and Services - Admitted Since 4/17/2023    Coordination has not been started for this encounter.     Selected Continued Care - Prior Encounters Includes continued care and service providers with selected services from prior encounters from 1/17/2023 to 4/18/2023    Discharged on 4/5/2023 Admission date: 3/24/2023 - Discharge disposition: Rehab Facility or Unit (DC - External)    Destination     Service Provider Selected Services Address Phone Fax Patient Preferred    [facility] [address] [id number] [phone] --                       Demographic Summary     Row Name 04/18/23 1047       General Information    Admission Type inpatient    Arrived From short stay unit    Referral Source admission list    Reason for Consult discharge planning       Contact Information    Permission Granted to Share Info With                Functional Status     Row Name 04/18/23 1048       Functional Status, IADL    Medications completely dependent    Meal Preparation completely dependent    Housekeeping completely dependent    Laundry completely dependent    Shopping completely dependent       Employment/    Employment Status retired               Psychosocial    No documentation.                Abuse/Neglect    No documentation.                Legal    No documentation.                Substance Abuse    No documentation.                Patient Forms    No documentation.                   Brittany Woodard RN

## 2023-04-18 NOTE — THERAPY EVALUATION
Acute Care - Speech Language Pathology   Swallow Initial Evaluation [facility]   Clinical Swallow Evaluation     Patient Name: Jeramie Otriz  : 12/15/1929  MRN: [id number]  Today's Date: 2023               Admit Date: 2023    Visit Dx:     ICD-10-CM ICD-9-CM   1. Sepsis secondary to UTI  A41.9 038.9    N39.0 995.91     599.0   2. HCAP (healthcare-associated pneumonia)  J18.9 486   3. Atrial fibrillation with RVR  I48.91 427.31   4. Anemia, unspecified type  D64.9 285.9   5. History of dementia  Z86.59 V11.8   6. Confusion  R41.0 298.9   7. Dysphagia, unspecified type  R13.10 787.20     Patient Active Problem List   Diagnosis   • Hypothyroidism   • Gout   • Essential hypertension   • Dementia   • CKD stage 3, GFR 30-59 ml/min (HCC)   • Septic shock   • Sepsis due to urinary tract infection (HCC)   • Altered mental status improved   • Atrial fibrillation with controlled rate (HCC)   • Severe Malnutrition (HCC)   • Metabolic encephalopathy   • Hypoglycemia   • Diastolic CHF, chronic   • Left lower lobe pneumonia   • Hypotension     Past Medical History:   Diagnosis Date   • CKD (chronic kidney disease) stage 3, GFR 30-59 ml/min 3/24/2023   • Dementia 3/24/2023   • Diastolic CHF, chronic 2023   • Essential hypertension 3/24/2023   • Gout 3/24/2023   • Hypothyroidism 3/24/2023     Past Surgical History:   Procedure Laterality Date   • CHOLECYSTECTOMY     • PROSTATE SURGERY     • REPLACEMENT TOTAL KNEE BILATERAL         SLP Recommendation and Plan  SLP Swallowing Diagnosis: oral dysphagia, suspected pharyngeal dysphagia (23 1000)  SLP Diet Recommendation: NPO, comfort diet, per physician discretion, other (see comments) (NPO for now. pending GOC/POC discussion. If proceed with comfort PO diet, suggest puree diet and if any signs discomfort with thin liquids, adjust to nectar-thick liquids if showing no aversion.) (23 1000)     SLP Rec. for Method of Medication Administration: meds via  alternate route (if proceed PO means, suggest in puree or ice cream, crush prn) (04/18/23 1000)     Monitor for Signs of Aspiration: notify SLP if any concerns (04/18/23 1000)  Recommended Diagnostics: reassess via clinical swallow evaluation (04/18/23 1000)  Swallow Criteria for Skilled Therapeutic Interventions Met: demonstrates skilled criteria (04/18/23 1000)  Anticipated Discharge Disposition (SLP): skilled nursing facility, anticipate therapy at next level of care (04/18/23 1000)             Plan of Care Reviewed With: patient      SWALLOW EVALUATION (last 72 hours)     SLP Adult Swallow Evaluation     Row Name 04/18/23 1000          Document Type evaluation  -SM    Patient Observations alert  -          Patient Profile Reviewed yes  -SM    Pertinent History Of Current Problem Adm increased confusion from baseline, poor PO intake. Recent sepsis/UTI. Dementia. Not following commands for medication administration. Palliaitve consult pending.  -    Current Method of Nutrition regular textures;thin liquids  -    Prior Level of Function-Swallowing unknown  -    Plans/Goals Discussed with patient  -    Barriers to Rehab cognitive status;medically complex  -    Patient's Goals for Discharge patient did not state  -          Additional Documentation Pain Scale: FACES Pre/Post-Treatment (Group)  -          Pain: FACES Scale, Pretreatment 2-->hurts little bit  -SM    Posttreatment Pain Rating 2-->hurts little bit  -          Oral Motor General Assessment unable to assess  -          Clinical Swallow Evaluation Summary Pt accepted minimal PO trials. Success with tsp H2O and taste, at most, of sherbet. Pulled away and refused all other attempts. Showing no interest in PO intake. Compromised respiratory status prior to trials, places at high risk aspiration. Would benefit from further palliative team discussion regarding GOC, with NPO vs comfort PO diet.  -SM          SLP Swallowing Diagnosis oral  dysphagia;suspected pharyngeal dysphagia  -    Functional Impact risk of aspiration/pneumonia;risk of malnutrition;risk of dehydration  -    Swallow Criteria for Skilled Therapeutic Interventions Met demonstrates skilled criteria  -          SLP Diet Recommendation NPO;comfort diet, per physician discretion;other (see comments)  NPO for now. pending GOC/POC discussion. If proceed with comfort PO diet, suggest puree diet and if any signs discomfort with thin liquids, adjust to nectar-thick liquids if showing no aversion.  -    Recommended Diagnostics reassess via clinical swallow evaluation  -    Oral Care Recommendations Oral Care BID/PRN;Suction toothbrush  -    SLP Rec. for Method of Medication Administration meds via alternate route  if proceed PO means, suggest in puree or ice cream, crush prn  -    Monitor for Signs of Aspiration notify SLP if any concerns  -    Anticipated Discharge Disposition (SLP) skilled nursing facility;anticipate therapy at next level of care  -          User Key  (r) = Recorded By, (t) = Taken By, (c) = Cosigned By    Initials Name Effective Dates    Aylin Sands MS CCC-SLP 02/03/23 -                 EDUCATION  The patient has been educated in the following areas:   Dysphagia (Swallowing Impairment).              Time Calculation:    Time Calculation- SLP     Row Name 04/18/23 1141             Time Calculation- SLP    SLP Start Time 1000  -      SLP Received On 04/18/23  -         Untimed Charges    79638-PC Eval Oral Pharyng Swallow Minutes 42  -SM         Total Minutes    Untimed Charges Total Minutes 42  -SM       Total Minutes 42  -SM            User Key  (r) = Recorded By, (t) = Taken By, (c) = Cosigned By    Initials Name Provider Type    Aylin Sands MS CCC-SLP Speech and Language Pathologist                Therapy Charges for Today     Code Description Service Date Service Provider Modifiers Qty    [id number] HC ST EVAL ORAL PHARYNG  SWALLOW 3 4/18/2023 Aylin Lam, MS CCC-SLP GN 1               Aylin Lam, MS MUKESH-SLP  4/18/2023

## 2023-04-18 NOTE — CONSULTS
Palliative Care Initial Consult   Attending Physician: Marisel Greene DO  Referring Provider: YASIR Morrell    Reason for Referral:  assistance with clarification of goals of care    Code Status:   Code Status and Medical Interventions:   Ordered at: 04/17/23 1634     Medical Intervention Limits:    NO intubation (DNI)     Code Status (Patient has no pulse and is not breathing):    No CPR (Do Not Attempt to Resuscitate)     Medical Interventions (Patient has pulse or is breathing):    Limited Support      Advanced Directives: Advance Directive Status: Patient has advance directive, copy in chart   Family/Support: Nelson Ortiz (son)   Goals of Care: TBD.    HPI: Jeramie Ortiz is a 89 y.o. male with PMH significant for CKD III, dementia, HFpEF, HTN, hypothyroidism, gout, with recent admission for septic shock secondary to urosepsis requiring vasopressors at [facility] 3/24-4/5, discharged to [facility]. Patient presented back to [facility] ED on 4/17 due to lethargy, worsening confusion. Work up revealed sepsis secondary to UTI, metabolic encephalopathy, hypotension with admission to ICU for vasopressor support. Palliative Care consulted for GOC in the context of complex medical decision making.   No family at bedside. Patient in restraints, alert to self, difficulty answering questions. Patient denies pain.   Called and spoke to son, Nelson, who confirms he is the only child of patient and sole decision maker. He reports prior to previous hospitalization patient lived at [facility], used a walker and wheelchair.     ROS: Denies pain. Unable to complete full ROS secondary to AMS.       Past Medical History:   Diagnosis Date   • CKD (chronic kidney disease) stage 3, GFR 30-59 ml/min 3/24/2023   • Dementia 3/24/2023   • Diastolic CHF, chronic 4/17/2023   • Essential hypertension 3/24/2023   • Gout 3/24/2023   • Hypothyroidism 3/24/2023     Past Surgical History:   Procedure Laterality Date   • CHOLECYSTECTOMY     •  "PROSTATE SURGERY     • REPLACEMENT TOTAL KNEE BILATERAL       Social History     Socioeconomic History   • Marital status:    Tobacco Use   • Smoking status: Former     Types: Cigarettes   Vaping Use   • Vaping Use: Never used   Substance and Sexual Activity   • Drug use: Never     History reviewed. No pertinent family history.    No Known Allergies    Current medication reviewed for route, type, dose and frequency and are current per MAR at time of dictation.    Palliative Performance Scale Score:  30%    BP (!) 88/76   Pulse 108   Temp 98.2 °F (36.8 °C) (Axillary)   Resp 16   Ht 175.3 cm (69\")   Wt 86.2 kg (190 lb)   SpO2 99%   BMI 28.06 kg/m²     Intake/Output Summary (Last 24 hours) at 4/18/2023 0821  Last data filed at 4/18/2023 0600  Gross per 24 hour   Intake 293.7 ml   Output 75 ml   Net 218.7 ml       Physical Exam:    General Appearance:    Patient laying in bed, awakens easily, alert, pulling at restraints   HEENT:    NC/AT, EOMI, anicteric, MM dry, face relaxed, NC in place   Neck:   supple, trachea midline, no JVD   Lungs:     CTA bilat, diminished in bases; respirations regular, even and unlabored; RR 16-18 on exam, 2LNC    Heart:    RRR, normal S1 and S2, no M/R/G,  on monitor   Abdomen:     Normal bowel sounds, soft, nontender, nondistended   G/U:   Deferred   MSK/Extremities:   Wasting, no edema, bilateral wrist restraints in place   Pulses:   Pulses palpable and equal bilaterally   Skin:   Warm, dry   Neurologic:   Alert to self only,    Psych:   Pulling at restraints,          Labs:   Results from last 7 days   Lab Units 04/18/23  0448   WBC 10*3/mm3 10.69   HEMOGLOBIN g/dL 7.7*   HEMATOCRIT % 25.9*   PLATELETS 10*3/mm3 141     Results from last 7 days   Lab Units 04/18/23  0448   SODIUM mmol/L 141   POTASSIUM mmol/L 3.8   CHLORIDE mmol/L 112*   CO2 mmol/L 16.0*   BUN mg/dL 18   CREATININE mg/dL 0.94   GLUCOSE mg/dL 135*   CALCIUM mg/dL 7.7*     Results from last 7 days   Lab " Units 04/18/23  0448 04/17/23  1937 04/17/23  1044   SODIUM mmol/L 141   < > 138   POTASSIUM mmol/L 3.8   < > 3.6   CHLORIDE mmol/L 112*   < > 109*   CO2 mmol/L 16.0*   < > 15.0*   BUN mg/dL 18   < > 16   CREATININE mg/dL 0.94   < > 0.89   CALCIUM mg/dL 7.7*   < > 8.2   BILIRUBIN mg/dL  --   --  1.5*   ALK PHOS U/L  --   --  1,524*   ALT (SGPT) U/L  --   --  9   AST (SGOT) U/L  --   --  23   GLUCOSE mg/dL 135*   < > 62*    < > = values in this interval not displayed.     Imaging Results (Last 72 Hours)     Procedure Component Value Units Date/Time    XR Chest 1 View [[id number]] Collected: 04/17/23 1954     Updated: 04/17/23 1958    Narrative:      XR CHEST 1 VW    Date of Exam: 4/17/2023 7:32 PM EDT    Indication: RRT.    Comparison: 4/17/2023.    Findings:  Consolidative changes are present within the left lower lobe with left-sided pleural effusion present. Small right-sided pleural effusion present with probable mild right basilar atelectasis. The heart appears mildly enlarged. The pulmonary vasculature   appears indistinct. No pneumothorax. No acute osseous abnormality identified.      Impression:      Impression:  Left lower lobe pneumonia appears more consolidated as compared to the previous study. Stable bilateral pleural effusions present with overlying right basilar atelectasis with probable mild pulmonary edema pattern.    Electronically Signed: Vi Miles    4/17/2023 7:54 PM EDT    Workstation ID: [id number]    XR Chest 1 View [[id number]] Collected: 04/17/23 1005     Updated: 04/17/23 1013    Narrative:      XR CHEST 1 VW    Date of Exam: 4/17/2023 9:43 AM EDT    Indication: Altered mental status    Comparison: 3/26/2023    Findings:  Redemonstrated elevation of the left hemidiaphragm. There are increased bibasilar opacities, the appearance of at least moderate pleural effusion on the right, and likely bilateral lower lobe airspace disease. There is no distinct pneumothorax. Unchanged   heart and  mediastinal contours.      Impression:      Impression:  Increased pleural effusions and basilar opacities, pneumonia versus edema pattern.    Electronically Signed: Landen Sylvester    4/17/2023 10:10 AM EDT    Workstation ID: [id number]    CT Head Without Contrast [[id number]] Collected: 04/17/23 0931     Updated: 04/17/23 0945    Narrative:      CT HEAD WO CONTRAST    Date of Exam: 4/17/2023 9:16 AM EDT    Indication: ams.    Comparison: MRI brain 3/24/2023    Technique: Axial CT images were obtained of the head without contrast administration.  Reconstructed coronal and sagittal images were also obtained. Automated exposure control and iterative construction methods were used.    Findings:  Parenchyma:No acute intraparenchymal hemorrhage. No loss of gray-white differentiation to suggest large territory infarct. Mild parenchymal volume loss. No substantial white matter disease. No midline shift or herniation.    Ventricles and extra axial spaces:Prominent ventricles and sulci secondary to volume loss. No extra axial fluid collection seen.    Other: Left lens replacement. Paranasal sinuses are clear. Mastoid air cells are clear. Calvarium is intact. Intracranial atherosclerotic calcification is present.      Impression:      Impression:  No evidence of acute intracranial hemorrhage or large territorial infarct.    Electronically Signed: Willy Harris    4/17/2023 9:41 AM EDT    Workstation ID: [id number]                Diagnostics: Reviewed    A:   Metabolic encephalopathy    Hypothyroidism    Dementia    CKD stage 3, GFR 30-59 ml/min (HCC)    Sepsis due to urinary tract infection (HCC)    Atrial fibrillation with controlled rate (HCC)    Severe Malnutrition (HCC)    Hypoglycemia    Diastolic CHF, chronic    Left lower lobe pneumonia    Hypotension     89 y.o. male with sepsis secondary to UTI, CHF, LLL PNA.     S/S:   1. AMS -metabolic encephalopathy     2. Dysphagia -SLP recommending comfort diet versus NPO/TF  -son  electing comfort diet, reports patient previously stated he did not want TF    3. Debility -?new baseline    4. GOC -DNR/DNI -per discussion with son  -called and spoke to Nelson, emerald RODRÍGUEZK and medical decision maker  -discussed patient's condition and Nelson would like to continue full treatment and reevaluate in two to three days     P: No family at bedside. Called and spoke to Nelson, son, who reports his father lived independantly at [facility] prior to his first hospitalization. Discussed speech evaluation findings and option for TF versus comfort diet. Son electing comfort diet at this time. He would like to continue all other interventions and reevaluate in two to three days as would like to give patient more time to improve or decline further. All questions and concerns addressed. Will continue to follow and support son as patient at high risk for decline.   Thank you for this consult and allowing us to participate in patient's plan of care. Palliative Care Team will continue to follow patient. Please do not hesitate to contact us regarding further symptom management or goals of care needs.  Time: 60 minutes spent reviewing medical and medication records, assessing and examining patient, discussing with family, answering questions, providing some guidance about a plan and documentation of care, and coordinating care with other healthcare members, with > 50% time spent face to face.         Hilda Salas, APRN  4/18/2023

## 2023-04-18 NOTE — PLAN OF CARE
Goal Outcome Evaluation:  Plan of Care Reviewed With: patient        Progress: no change  Outcome Evaluation: New palliative consult for assistance with GOC per YASIR Jeffery.  No ACP on file.  NOK is son Nelson Ortiz.  EMS DNR on file.  YASIR Trinidad and palliative RN saw pt for initial palliative consult this afternoon.  Palliative brochure and blanket provided.  No family at BS.  Pt. denied pain and nausea.  Pt. audibly wheezy on 2LNC.  Per nursing report, pt refusing care including simple things like oral care.  Pt. had to restrained for picc line placement this afternoon.  Pt has been agitated and restless during attempts at nursing care.  Failed swallow eval.  Last documented BM 4/16.  APRN to attempt to contact family regarding GOC.  Palliative care to continue to follow for support, POC and ongoing GOC.      Problem: Palliative Care  Goal: Enhanced Quality of Life  Intervention: Promote Advance Care Planning  Flowsheets (Taken 4/18/2023 1255)  Life Transition/Adjustment:   palliative care initiated   palliative care discussed     palliative RIGOBERTO ASHER DO, RN

## 2023-04-18 NOTE — PROGRESS NOTES
Pulmonary/Critical Care Follow-up     LOS: 1 day   Patient Care Team:  Kingsley Downing MD as PCP - General (Family Medicine)  James Manzo MD as Consulting Physician (Cardiology)      Chief Complaint   Patient presents with   • Altered Mental Status     Subjective      History reviewed and updated in EMR as indicated.    Interval History:     Patient is confused.  He is unable to relay any history.  Patient is off of phenylephrine this morning.  Other than saying he is cold (after his bath), I am unable to understand any of what he is saying.    History taken from: Chart, staff    PMH/FH/Social History were reviewed and updated appropriately in the electronic medical record.     Review of Systems:    Review of 14 systems was completed with positives and pertinent negatives noted in the subjective section.  All other systems reviewed and are negative.         Objective     Vital Signs  Temp:  [97.5 °F (36.4 °C)-98.2 °F (36.8 °C)] 97.5 °F (36.4 °C)  Heart Rate:  [] 108  Resp:  [12-24] 16  BP: ()/(31-86) 89/64  04/17 0701 - 04/18 0700  In: 293.7 [I.V.:193.7]  Out: 75 [Urine:75]  Body mass index is 28.06 kg/m².     IV drips:  dextrose, Last Rate: 50 mL/hr (04/17/23 2235)  norepinephrine  phenylephrine, Last Rate: Stopped (04/18/23 0131)       Physical Exam:     Constitutional:    Confused appearing, in no acute distress   Head:   Normocephalic, atraumatic   Eyes:           Lids and lashes normal, conjunctivae and sclerae normal.  PER   ENMT:  Ears appear intact with no abnormalities noted     Lips normal.     Neck:  Trachea midline, no JVD   Lungs/Resp:    Normal effort, symmetric chest rise, no crepitus, clear to      auscultation bilaterally.               Heart/CV:    Irregularly irregular and mildly tachycardic, no murmur   Abdomen/GI:    Soft, nontender, nondistended   :    Deferred   Extremities/MSK:  No clubbing or cyanosis.  Trace bilateral lower extremity edema.     Pulses:  Pulses  palpable and equal bilaterally   Skin:  No bleeding, bruising or rash   Heme/Lymph:  No cervical or supraclavicular adenopathy.   Neurologic:    Psychiatric:    Moves all extremities with no obvious focal motor deficit.  Confused and a bit restless.  Speech dysarthric.  Not agitated.    The above physical exam findings were reviewed and reflect my exam findings as of today's exam.   Electronically signed by:  Anish De La Fuente MD  04/18/23  17:35 EDT      Results Review:     I reviewed the patient's new clinical results.   Results from last 7 days   Lab Units 04/18/23 0448 04/17/23 1937 04/17/23  1044   SODIUM mmol/L 141 144 138   POTASSIUM mmol/L 3.8 3.7 3.6   CHLORIDE mmol/L 112* 114* 109*   CO2 mmol/L 16.0* 15.0* 15.0*   BUN mg/dL 18 16 16   CREATININE mg/dL 0.94 0.99 0.89   CALCIUM mg/dL 7.7* 8.1* 8.2   BILIRUBIN mg/dL  --   --  1.5*   ALK PHOS U/L  --   --  1,524*   ALT (SGPT) U/L  --   --  9   AST (SGOT) U/L  --   --  23   GLUCOSE mg/dL 135* 69 62*     Results from last 7 days   Lab Units 04/18/23 0448 04/17/23 1937 04/17/23  1044   WBC 10*3/mm3 10.69 7.59 3.96   HEMOGLOBIN g/dL 7.7* 8.7* 8.7*   HEMATOCRIT % 25.9* 30.4* 30.5*   PLATELETS 10*3/mm3 141 160 99*     Results from last 7 days   Lab Units 04/17/23 1930   PH, ARTERIAL pH units 7.440   PO2 ART mm Hg 231.0*   PCO2, ARTERIAL mm Hg 24.2*   HCO3 ART mmol/L 16.4*     Results from last 7 days   Lab Units 04/18/23 0448 04/17/23 1937 04/17/23  1044   MAGNESIUM mg/dL 1.9 1.6* 1.8   PHOSPHORUS mg/dL 3.0  --   --      Lactic acid: 2.1<3.6<1.7<2.3    I reviewed the patient's new imaging including images and reports.    Chest x-ray 4/18/2023 shows bilateral left greater than right patchy airspace opacities with borderline cardiomegaly and right upper extremity PICC line in place with tip in the SVC.    Medication Review:   acetaminophen, 1,000 mg, Oral, TID  citalopram, 10 mg, Oral, Daily  enoxaparin, 40 mg, Subcutaneous, Q PM  levothyroxine, 50 mcg, Oral,  Q AM  megestrol, 800 mg, Oral, Daily  midodrine, 10 mg, Oral, TID AC  pantoprazole, 40 mg, Oral, QAM AC  piperacillin-tazobactam, 3.375 g, Intravenous, Q8H  sodium chloride, 10 mL, Intravenous, Q12H      dextrose, 50 mL/hr, Last Rate: 50 mL/hr (04/17/23 2442)  norepinephrine, 0.02-0.3 mcg/kg/min  phenylephrine, 0.5-3 mcg/kg/min, Last Rate: Stopped (04/18/23 0131)        Assessment & Plan         Metabolic encephalopathy    Hypothyroidism    Dementia    CKD stage 3, GFR 30-59 ml/min ([facility])    Sepsis due to urinary tract infection ([facility])    Atrial fibrillation with controlled rate ([facility])    Severe Malnutrition ([facility])    Hypoglycemia    Diastolic CHF, chronic    Left lower lobe pneumonia    Hypotension    89 y.o. male with history of dementia, CKD 3, chronic HFpEF, hypothyroidism, discharged from [facility] to [facility] on 4/5/2023 after hospitalization for sepsis secondary to urinary tract infection requiring ICU admission and vasopressors.    Patient presented back to [facility] ED from [facility] on 4/17/2023 with AMS, hypoglycemia, and hypotension.  He was initially admitted to the hospital medicine service and started on Zosyn.  Despite 3 L IV fluids and albumin he continued to develop worsening hypotension and was transferred to the intensive care unit.  Patient required BiPAP due to increased work of breathing prior to transfer to the ICU.    Chest x-ray concerning for left lower lobe infiltrate.    Patient is currently off pressors.  Not requiring BiPAP.  He remains restless with borderline blood pressure.  Lactic acid level improved.  Urine cultures growing greater than 100,000 CFU of GNR.    Plan:  1.  For severe sepsis due to urinary source: Continue Zosyn.  Follow-up cultures.  Monitor hemodynamics.  Pressors if needed.  Midodrine if tolerates.  2.  For encephalopathy/delirium on dementia: Likely secondary to sepsis and underlying dementia.  3.  For A-fib: Monitor.  Patient probably not appropriate for  anticoagulation due to fall risk.  4.  For possible left lower lobe pneumonia: Continue Zosyn.  Check MRSA screen.  5.  For hypoglycemia: D10 at 50 mL an hour.  Monitor blood sugars.  6.  Dysphagia: Speech evaluation.  Speech recommended n.p.o.  Family electing for comfort diet.  7.  For hypothyroidism: Levothyroxine.  8.  Goals of care: Appreciate palliative assistance.  DNR/DNI.  Full support for now.    Patient is critically ill secondary to severe sepsis and has high risk of life-threatening decompensation in condition.   As such, the patient requires continuous monitoring and frequent reassessment for consideration of adjustment in management to minimize this risk.  I personally reassessed the patient multiple times today.    Critical care time : 43 minutes spent by me personally and independently.(This excludes time spent performing separately reportable procedures and services).  Critical care time includes high complexity decision making to assess, manipulate, and support vital organ system failure in this individual who has impairment of one or more vital organ systems such that there is a high probability of imminent or life threatening deterioration in the patient’s condition.      Electronically signed by:    Anish De La Fuente MD  04/18/23  17:35 EDT      *. Please note that portions of this note were completed with [employer] - a voice recognition program.    solids

## 2023-04-18 NOTE — NURSING NOTE
"                             Wound, Ostomy and Continence (WOC) Note    Reason for WOC Consultation: \"Pressure injury-coccyx POA\"     Patient confused, awake.  Family/support person not present.     Patient had a suspected stage II pressure injury to the coccyx during last admission on 3/27/2023.    Skin/Wound Assessment:     Wound Type: MASD (Moisture associated skin damage) - Incontinence Associated Dermatitis (IAD)  Location: Coccyx and bilateral lower gluteal  Wound Bed: blanchable, dermis and pink  Wound Edges: Irregular and Open  Periwound Skin: intact, blanchable and dry   Drainage Characteristics/Odor: none  Drainage Amount: none  Pain: No   Care provided: The wound was cleansed with pH foam cleanser and barrier wipes.  Image:         Summary and Recommendation(s):     -Implement pressure injury prevention protocol measures.  -Keep patient skin clean and dry.  Apply barrier cream twice daily and after incontinence episodes.  - Refer to wound care instructions in the \"Orders\" tab  - Specialty support surface in place: Isolibrium         Pressure Injury Prevention Measures (initiate for a David Scale Score of <18):     Most recent David Scale score:  Sensory Perception: 2-->very limited  Moisture: 3-->occasionally moist  Activity: 1-->bedfast  Mobility: 2-->very limited  Nutrition: 2-->probably inadequate  Friction and Shear: 2-->potential problem  David Score: 12 (04/18/23 0000)     -Turn q 2 hr. using an offloading foam wedge, keep heels elevated and offloaded with offloading heel boots.    -Raise knee-gatch before elevating HOB to reduce shearing   -Follow C.A.R.E protocol if medical devices (Bipap, gilmore, Ng tube, etc) are being used.  -Apply moisture barrier cream to bottom BID & PRN, if incontinent.  -If incontinent, consider applying an external catheter. External catheters are finicky and should be checked every few hours for placement.   -Clean skin gently with no-rinse PH-balanced foam cleanser " and a soft, disposable cloth (barrier wipes-blue pack).   -Reduce layers under patient (one sheet as drawsheet and two incontinence pads)    Thank you for consulting the WOC Nurse.  WOC Team will sign off.  Please re consult if the wound(s) worsens.     Dominik Orona RN, BSN, CCRN, CWOCN  Wound, Ostomy and Continence (WOC) Department  [facility]

## 2023-04-18 NOTE — PLAN OF CARE
Goal Outcome Evaluation:        -D10 gtt infusing at 50 mL/hr   -dopamine gtt d/c'd, MAP sustained >65 throughout shift   -switched from Bipap to 2L nasal canula, now on RA  -restless, confused, weak cough, and garbled speech with expiratory wheezing and crackles   -4+ edema   -PICC consulted

## 2023-04-18 NOTE — CONSULTS
PICC placed per GRACIELA Gray RN [facility]   CXR order to confirm placement. Please page with results.    BUE forearms noted edematous and weepy prior to procedure. RN aware.

## 2023-04-18 NOTE — PROGRESS NOTES
INTENSIVIST   INITIAL HOSPITAL VISIT NOTE     Hospital:  LOS: 0 days     Mr. Jeramie Ortiz, 89 y.o. male is seen for a Chief Complaint of:  Chief Complaint   Patient presents with   • Altered Mental Status       Metabolic encephalopathy    Hypothyroidism    Dementia    CKD stage 3, GFR 30-59 ml/min (HCC)    Sepsis due to urinary tract infection (HCC)    Atrial fibrillation with controlled rate (HCC)    Severe Malnutrition (HCC)    Hypoglycemia    Diastolic CHF, chronic    Left lower lobe pneumonia    Hypotension      Subjective   S     Jeramie Ortiz is a 89 y.o. male who was admitted by Hospital Medicine on 4/17 for sepsis of urinary origin.  The patient had recently been inpatient @ our facility 3/24-4/5 for urosepsis requiring vasopressors, treated w/ Zosyn (culture negative), and eventual D/C to [facility] (formerly [facility]).      Approximately 3-4 days ago patient became more confused than baseline w/ poor PO intake & intermittent agitation requiring BZD on 4/16.  His symptoms continued to worsen and so he was brought to our ED where w/u revealed hypoglycemia (57) & UTI.  He was given > 3 L IVF for resuscitation and admitted to telemetry.  His SBP declined to < 80 despite volume and we have been asked to admit him to the ICU for pressor therapy.     PMH: He  has a past medical history of CKD (chronic kidney disease) stage 3, GFR 30-59 ml/min (3/24/2023), Dementia (3/24/2023), Diastolic CHF, chronic (4/17/2023), Essential hypertension (3/24/2023), Gout (3/24/2023), and Hypothyroidism (3/24/2023).   PSxH: He  has a past surgical history that includes Replacement total knee bilateral; Prostate surgery; and Cholecystectomy.      Medications:  No current facility-administered medications on file prior to encounter.     Current Outpatient Medications on File Prior to Encounter   Medication Sig   • acetaminophen (TYLENOL) 325 MG tablet Take 2 tablets by mouth Every 4 (Four) Hours As Needed for Mild Pain. OTC   • bisacodyl (DULCOLAX) 10 MG  suppository Insert 1 suppository into the rectum Daily As Needed for Constipation. OTC   • bisacodyl (DULCOLAX) 5 MG EC tablet Take 2 tablets by mouth Daily As Needed for Constipation. OTC   • busPIRone (BUSPAR) 5 MG tablet Take 1 tablet by mouth 3 (Three) Times a Day.   • cholestyramine (QUESTRAN) 4 g packet Take 1 packet by mouth 2 (Two) Times a Day With Meals. OTC   • citalopram (CeleXA) 10 MG tablet Take 1 tablet by mouth every night at bedtime.   • Diclofenac Sodium (VOLTAREN) 1 % gel gel Apply 2 g topically to the appropriate area as directed 4 (Four) Times a Day. (Patient taking differently: Apply 2 g topically to the appropriate area as directed 4 (Four) Times a Day. OTC)   • folic acid (FOLVITE) 0.5 MG half tablet Take 1 half tablet by mouth Daily.   • Heparin Sodium, Porcine, (heparin, porcine,) 1000 UNIT/ML injection Inject 5 mL under the skin into the appropriate area as directed Every 12 (Twelve) Hours.   • levothyroxine (SYNTHROID, LEVOTHROID) 50 MCG tablet Take 1 tablet by mouth Daily.   • loperamide (IMODIUM) 2 MG capsule Take 2 capsules by mouth Daily.   • megestrol (MEGACE) 40 MG/ML suspension Take 20 mL by mouth Daily.   • melatonin 3 MG tablet Take 2 tablets by mouth Every Night. OTC   • midodrine (PROAMATINE) 10 MG tablet Take 1 tablet by mouth 3 (Three) Times a Day Before Meals.   • ondansetron ODT (ZOFRAN-ODT) 4 MG disintegrating tablet Place 1 tablet on the tongue Every 6 (Six) Hours As Needed for Nausea or Vomiting.   • pantoprazole (PROTONIX) 40 MG EC tablet Take 1 tablet by mouth Daily.   • polyethylene glycol (MIRALAX) 17 g packet Take 17 g by mouth Daily As Needed (constipation). OTC   • traMADol (ULTRAM) 50 MG tablet Take 1 tablet by mouth Every 8 (Eight) Hours As Needed for Moderate Pain.   • [DISCONTINUED] cholestyramine light 4 g packet Take 1 packet by mouth 2 (Two) Times a Day With Meals for 5 days.   • [DISCONTINUED] folic acid (FOLVITE) 400 MCG tablet Take 1 tablet by mouth  Daily.       Allergies: He has No Known Allergies.   FH: His family history is not on file.   SH: He  reports that he has quit smoking. His smoking use included cigarettes. He does not have any smokeless tobacco history on file. He reports that he does not use drugs.     The patient's relevant past medical, surgical and social history were reviewed and updated in Epic as appropriate.     ROS (14): See HPI and H&P      Objective   O     Vitals    Temp  Min: 98.2 °F (36.8 °C)  Max: 98.4 °F (36.9 °C)  BP  Min: 73/62  Max: 115/73  Pulse  Min: 87  Max: 134  Resp  Min: 18  Max: 22  SpO2  Min: 84 %  Max: 100 % No data recorded     I/O 24 hrs (7:00AM - 6:59 AM)  Intake/Output       04/17/23 0700 - 04/18/23 0659    Intake (ml) 100    Output (ml) --    Net (ml) 100    Last Weight 86.2 kg (190 lb)          Medications (drips):  dextrose, Last Rate: 50 mL/hr (04/17/23 2235)  norepinephrine  [START ON 4/18/2023] phenylephrine        Physical Examination    Telemetry: Sinus tachycardia.    Constitutional:  No acute distress.  Resting in bed on Bipap at 35%.   HEENT: Normocephalic and atraumatic.   Neck:  Normal range of motion. Neck supple.   No JVD present.   No thyromegaly.    Cardiovascular: Regular rate and rhythm.  No murmurs, rub or gallop.  No peripheral edema.   Respiratory: Normal respiratory effort.  Bilateral rales.    Abdominal:  Soft. No masses.   Non-tender. No distension.   No hepatosplenomegaly.   MSK: Normal muscle strength and tone.   Extremities: No digital cyanosis or clubbing.   Skin: Skin is warm and dry.  No rashes, lesions or ulcers noted.    Neurological:   Arouses to stimulation and answers orientation questions.   Moves all extremities.   Psychiatric:  Normal affect.   Normal judgment.         Results from last 7 days   Lab Units 04/17/23  1937 04/17/23  1044   WBC 10*3/mm3 7.59 3.96   HEMOGLOBIN g/dL 8.7* 8.7*   MCV fL 104.5* 104.8*   PLATELETS 10*3/mm3 160 99*     Results from last 7 days   Lab Units  04/17/23 1937 04/17/23  1044   SODIUM mmol/L 144 138   POTASSIUM mmol/L 3.7 3.6   CO2 mmol/L 15.0* 15.0*   CREATININE mg/dL 0.99 0.89   MAGNESIUM mg/dL 1.6* 1.8     Estimated Creatinine Clearance: 50.7 mL/min (by C-G formula based on SCr of 0.99 mg/dL).  Results from last 7 days   Lab Units 04/17/23  1044   ALK PHOS U/L 1,524*   BILIRUBIN mg/dL 1.5*   ALT (SGPT) U/L 9   AST (SGOT) U/L 23       Results from last 7 days   Lab Units 04/17/23  1930 04/17/23  1008   PH, ARTERIAL pH units 7.440 7.363   PCO2, ARTERIAL mm Hg 24.2* 39.1   PO2 ART mm Hg 231.0* 16.8*   FIO2 % 100 21       Images:   4/17/23      Imaging Results (Last 24 Hours)     Procedure Component Value Units Date/Time    XR Chest 1 View [[id number]] Collected: 04/17/23 1954     Updated: 04/17/23 1958    Narrative:      XR CHEST 1 VW    Date of Exam: 4/17/2023 7:32 PM EDT    Indication: RRT.    Comparison: 4/17/2023.    Findings:  Consolidative changes are present within the left lower lobe with left-sided pleural effusion present. Small right-sided pleural effusion present with probable mild right basilar atelectasis. The heart appears mildly enlarged. The pulmonary vasculature   appears indistinct. No pneumothorax. No acute osseous abnormality identified.      Impression:      Impression:  Left lower lobe pneumonia appears more consolidated as compared to the previous study. Stable bilateral pleural effusions present with overlying right basilar atelectasis with probable mild pulmonary edema pattern.    Electronically Signed: Vi Miles    4/17/2023 7:54 PM EDT    Workstation ID: [id number]    XR Chest 1 View [[id number]] Collected: 04/17/23 1005     Updated: 04/17/23 1013    Narrative:      XR CHEST 1 VW    Date of Exam: 4/17/2023 9:43 AM EDT    Indication: Altered mental status    Comparison: 3/26/2023    Findings:  Redemonstrated elevation of the left hemidiaphragm. There are increased bibasilar opacities, the appearance of at least moderate pleural  effusion on the right, and likely bilateral lower lobe airspace disease. There is no distinct pneumothorax. Unchanged   heart and mediastinal contours.      Impression:      Impression:  Increased pleural effusions and basilar opacities, pneumonia versus edema pattern.    Electronically Signed: Landen Sylvester    4/17/2023 10:10 AM EDT    Workstation ID: [id number]    CT Head Without Contrast [[id number]] Collected: 04/17/23 0931     Updated: 04/17/23 0945    Narrative:      CT HEAD WO CONTRAST    Date of Exam: 4/17/2023 9:16 AM EDT    Indication: ams.    Comparison: MRI brain 3/24/2023    Technique: Axial CT images were obtained of the head without contrast administration.  Reconstructed coronal and sagittal images were also obtained. Automated exposure control and iterative construction methods were used.    Findings:  Parenchyma:No acute intraparenchymal hemorrhage. No loss of gray-white differentiation to suggest large territory infarct. Mild parenchymal volume loss. No substantial white matter disease. No midline shift or herniation.    Ventricles and extra axial spaces:Prominent ventricles and sulci secondary to volume loss. No extra axial fluid collection seen.    Other: Left lens replacement. Paranasal sinuses are clear. Mastoid air cells are clear. Calvarium is intact. Intracranial atherosclerotic calcification is present.      Impression:      Impression:  No evidence of acute intracranial hemorrhage or large territorial infarct.    Electronically Signed: Willy Harris    4/17/2023 9:41 AM EDT    Workstation ID: [id number]        ECG/EMG Results (last 24 hours)     Procedure Component Value Units Date/Time    ECG 12 Lead Altered Mental Status [[id number]] Collected: 04/17/23 1127     Updated: 04/17/23 1404     QT Interval 290 ms      QTC Interval 394 ms     Narrative:      Test Reason : Altered Mental Status  Blood Pressure :   */*   mmHG  Vent. Rate : 111 BPM     Atrial Rate : 153 BPM     P-R Int :   * ms           QRS Dur :  74 ms      QT Int : 290 ms       P-R-T Axes :   * -39 226 degrees     QTc Int : 394 ms    Atrial fibrillation with rapid ventricular response  Left axis deviation  Low voltage QRS  Inferior infarct (cited on or before 26-MAR-2023)  Cannot rule out Anterior infarct (cited on or before 26-MAR-2023)  Abnormal ECG  Confirmed by MD Otero Michael ([id number]) on 4/17/2023 2:04:01 PM    Referred By: CASS           Confirmed By: Nelson Otero MD    ECG 12 Lead Altered Mental Status [[id number]] Collected: 04/17/23 1924     Updated: 04/17/23 1924     QT Interval 428 ms      QTC Interval 594 ms     Narrative:      Test Reason : Altered Mental Status  Blood Pressure :   */*   mmHG  Vent. Rate : 116 BPM     Atrial Rate : 163 BPM     P-R Int :   * ms          QRS Dur :  82 ms      QT Int : 428 ms       P-R-T Axes :   * -32 -44 degrees     QTc Int : 594 ms    ** Poor data quality, interpretation may be adversely affected  Atrial fibrillation with rapid ventricular response with premature ventricular or aberrantly conducted complexes  Left axis deviation  Low voltage QRS  Cannot rule out Anteroseptal infarct (cited on or before 26-MAR-2023)  T wave abnormality, consider lateral ischemia  Abnormal ECG  When compared with ECG of 17-APR-2023 11:27,  Inverted T waves have replaced nonspecific T wave abnormality in Lateral leads    Referred By: divina           Confirmed By:           I reviewed the patient's new laboratory and imaging results.  I independently reviewed the patient's new images.    Assessment & Plan   A / P     Mr. Ortiz is a 89yo M with a history of dementia, CKD 3, chronic diastolic heart failure, and hypothyroidism who was recently discharged from [facility] to [facility] on 4/5/23 after hospitalization for sepsis secondary to urinary tract infection requiring ICU admission and vasopressors.     He presented back to the [facility] ED from [facility] on 4/17/23 with altered mental status, hypoglycemia and hypotension. He was  initially admitted to Hospital Medicine and started on Zosyn. Despite 3.5L of IV fluid plus albumin, he continued to develop worsening hypotension and was transferred to the ICU.     Prior to ICU transfer, dopamine was started while awaiting an ICU bed. He was placed on Bipap at 35% for increased work of breathing.     Chest xray was repeated and shows a worsening left lower lobe infiltrate.    Upon arrival to the ICU, Dopamine has been stopped. He remains on Bipap. He is able to arouse and answer orientation questions.       Nutrition:   Diet: Regular/House Diet; Texture: Regular Texture (IDDSI 7); Fluid Consistency: Thin (IDDSI 0)  Advance Directives:   Code Status and Medical Interventions:   Ordered at: 04/17/23 7412     Medical Intervention Limits:    NO intubation (DNI)     Code Status (Patient has no pulse and is not breathing):    No CPR (Do Not Attempt to Resuscitate)     Medical Interventions (Patient has pulse or is breathing):    Limited Support         Metabolic encephalopathy    Hypothyroidism    Dementia    CKD stage 3, GFR 30-59 ml/min (HCC)    Sepsis due to urinary tract infection (HCC)    Atrial fibrillation with controlled rate (HCC)    Severe Malnutrition (HCC)    Hypoglycemia    Diastolic CHF, chronic    Left lower lobe pneumonia    Hypotension      Assessment / Plan:    1. Transfer to ICU  2. Fixed dose dopamine while on floor, transition to neosynephrine (2/2 sinus tachycardia) once in unit. Titrate vasopressors to maintain MAP > 65.   3. Continue Midodrine 10mg TID as able.   4. D10 infusion while hypoglycemic  5. Diurese when able to tolerate  6. Treat Mg of 1.6  7. NIPPV prn. Trial off Bipap as tolerated.     8. Palliative Care consult in am  9. DNR/DNI     Critically ill secondary to severe hypotension requiring titration of vasopressors.     Plan of care and goals reviewed during interdisciplinary rounds.  I discussed the patient's findings and my recommendations with patient and nursing  staff    Time:   Critical Care Time: was greater than 31 minutes.(excluding time spent on other separately billable procedures or treating other patients).        Marisel V Case, DO  Pulmonary and Critical Care Medicine      5 minutes of critical care provided by YASIR Morrell in addendum accordance with split/shared billing. This time excludes other billable procedures. Time does include preparation of documents, medical consultations, review of old records, and direct bedside care. Patient is at high risk for life-threatening deterioration due to cardiogenic shock.   Electronically signed by YASIR Vyas, 04/17/23, 9:53 PM EDT.

## 2023-04-18 NOTE — PLAN OF CARE
Goal Outcome Evaluation:  Plan of Care Reviewed With: patient            SLP dysphagia evaluation completed. Pt showing no interest in PO intake at this time. Accepted minimal offerings. At general risk aspiration. Recommendations pending further medical GOC/POC discussion. Cannot recommend safe PO diet. No family at the bedside. If proceed with comfort PO diet, suggest puree and thin liquids though adjusting to nectar-thick liquids if any significant discomfort with thin liquids (and no aversion to nectar). SLP will f/u for ? Dysphagia POC. Please see note for further details and recommendations.

## 2023-04-19 PROBLEM — D64.9 ANEMIA: Status: ACTIVE | Noted: 2023-01-01

## 2023-04-19 NOTE — PLAN OF CARE
Goal Outcome Evaluation:  Plan of Care Reviewed With: patient, family        Progress: improving     -Pt oriented to self all night, mostly uncooperative all night    -VSS on RA  - mL  -D10 gtt continues  -K replacing per protocol   -Restraints d/c'd  -PO meds held due to pt incorporation

## 2023-04-19 NOTE — PLAN OF CARE
Goal Outcome Evaluation:      Pt still uncooperative and resistive to staff and pulling at pulse ox and leads but seems to be slightly more alert as the day wore on

## 2023-04-19 NOTE — PLAN OF CARE
Problem: Palliative Care  Goal: Enhanced Quality of Life  Intervention: Optimize Psychosocial Wellbeing  Recent Flowsheet Documentation  Taken 4/19/2023 1146 by Rosario Ryan, MSW  Supportive Measures: (symptom assessment)   active listening utilized   positive reinforcement provided   other (see comments)  Visit at bedside with patient and nurse, patient denies pain, nausea, dyspnea.  SLP seeing patient for dysphagia, son has requested comfort diet.  CM working with son's preference on disposition.  Palliative APRN to reach out to son for further discussion - Palliative Care continues to follow for support and assist with plan of care as possible.    1300 Palliative IDT: DO; APRN; LCSW; RN; MDiv

## 2023-04-19 NOTE — PROGRESS NOTES
Malnutrition Severity Assessment    Patient Name:  Jeramie Ortzi  YOB: 1929  MRN: [id number]  Admit Date:  4/17/2023    Patient meets criteria for : Severe Malnutrition    Malnutrition Severity Assessment  Malnutrition Type: Chronic Disease - Related Malnutrition  Malnutrition Type (last 8 hours)     Malnutrition Severity Assessment     Row Name 04/19/23 1803       Malnutrition Severity Assessment    Malnutrition Type Chronic Disease - Related Malnutrition    Row Name 04/19/23 1803       Insufficient Energy Intake     Insufficient Energy Intake Findings --    Row Name 04/19/23 1803       Unintentional Weight Loss     Unintentional Weight Loss Findings Severe  30lb wt loss over 5 months, 14% wt loss    Unintentional Weight Loss  Weight loss of 10% in six months    Row Name 04/19/23 1803       Muscle Loss    Loss of Muscle Mass Findings Severe    Religion Region Severe - deep hollowing/scooping, lack of muscle to touch, facial bones well defined    Clavicle Bone Region Severe - protruding prominent bone    Row Name 04/19/23 1803       Fat Loss    Subcutaneous Fat Loss Findings Severe  severe buccal fat loss    Orbital Region  Severe - pronounced hollowness/depression, dark circles, loose saggy skin    Row Name 04/19/23 1803       Fluid Accumulation (Edema)    Fluid Acumulation Findings Severe    Fluid Accumulation  Severe equals 3+ or 4+ pitting edema    Row Name 04/19/23 1803       Criteria Met (Must meet criteria for severity in at least 2 of these categories: M Wasting, Fat Loss, Fluid, Secondary Signs, Wt. Status, Intake)    Patient meets criteria for  Severe Malnutrition                Electronically signed by:  Rosario Mace RD  04/19/23 18:09 EDT

## 2023-04-19 NOTE — PROGRESS NOTES
Pulmonary/Critical Care Follow-up     LOS: 2 days   Patient Care Team:  Kingsley Downing MD as PCP - General (Family Medicine)  James Mazno MD as Consulting Physician (Cardiology)      Chief Complaint   Patient presents with   • Altered Mental Status     Subjective      History reviewed and updated in EMR as indicated.    Interval History:     Patient is more awake today.  He remains confused.  He is unable to relay any history really.  No fevers or chills.  Bedside swallow done by speech with persistent throat clearing after swallowing.  Plan for modified barium swallow today or tomorrow.  Tolerating 2 L nasal cannula.    History taken from: Chart, staff    PMH/FH/Social History were reviewed and updated appropriately in the electronic medical record.     Review of Systems:    Review of 14 systems was completed with positives and pertinent negatives noted in the subjective section.  All other systems reviewed and are negative.         Objective     Vital Signs  Temp:  [97.5 °F (36.4 °C)-97.6 °F (36.4 °C)] 97.5 °F (36.4 °C)  Heart Rate:  [] 115  Resp:  [16-20] 18  BP: ()/(57-86) 101/57  04/18 0701 - 04/19 0700  In: 1526 [I.V.:1326]  Out: 125 [Urine:125]  Body mass index is 28.06 kg/m².     IV drips:  phenylephrine, Last Rate: Stopped (04/18/23 0131)       Physical Exam:     Constitutional:    Alert I know the MRSA swab is negative as I know the MRSA swab is negative I will ask more awake, continues to be confused, in no acute distress   Head:   Normocephalic, atraumatic   Eyes:           Lids and lashes normal, conjunctivae and sclerae normal.  PER   ENMT:  Ears appear intact with no abnormalities noted     Lips normal.     Neck:  Trachea midline, no JVD   Lungs/Resp:    Normal effort, symmetric chest rise, no crepitus, clear to      auscultation bilaterally.               Heart/CV:    Irregularly irregular and mildly tachycardic, no murmur   Abdomen/GI:    Soft, nontender, nondistended   :     Deferred   Extremities/MSK:  No clubbing or cyanosis.  Trace bilateral lower extremity edema and 1+ upper extremity edema.     Pulses:  Pulses palpable and equal bilaterally   Skin:  No bleeding, bruising or rash   Heme/Lymph:  No cervical or supraclavicular adenopathy.   Neurologic:    Psychiatric:    Moves all extremities with no obvious focal motor deficit.  Confused and a bit restless.  Speech dysarthric.  A bit restless, not agitated.    The above physical exam findings were reviewed and reflect my exam findings as of today's exam.   Electronically signed by:  Anish De La Fuente MD  04/19/23  16:00 EDT      Results Review:     I reviewed the patient's new clinical results.   Results from last 7 days   Lab Units 04/19/23 0406 04/18/23 0448 04/17/23 1937 04/17/23  1044   SODIUM mmol/L 144 141 144 138   POTASSIUM mmol/L 3.4* 3.8 3.7 3.6   CHLORIDE mmol/L 114* 112* 114* 109*   CO2 mmol/L 18.0* 16.0* 15.0* 15.0*   BUN mg/dL 19 18 16 16   CREATININE mg/dL 0.92 0.94 0.99 0.89   CALCIUM mg/dL 8.0* 7.7* 8.1* 8.2   BILIRUBIN mg/dL  --   --   --  1.5*   ALK PHOS U/L  --   --   --  1,524*   ALT (SGPT) U/L  --   --   --  9   AST (SGOT) U/L  --   --   --  23   GLUCOSE mg/dL 152* 135* 69 62*     Results from last 7 days   Lab Units 04/19/23 0406 04/18/23 0448 04/17/23 1937   WBC 10*3/mm3 12.03* 10.69 7.59   HEMOGLOBIN g/dL 7.7* 7.7* 8.7*   HEMATOCRIT % 25.4* 25.9* 30.4*   PLATELETS 10*3/mm3 133* 141 160     Results from last 7 days   Lab Units 04/17/23 1930   PH, ARTERIAL pH units 7.440   PO2 ART mm Hg 231.0*   PCO2, ARTERIAL mm Hg 24.2*   HCO3 ART mmol/L 16.4*     Results from last 7 days   Lab Units 04/19/23 0406 04/18/23 0448 04/17/23  1937   MAGNESIUM mg/dL 2.0 1.9 1.6*   PHOSPHORUS mg/dL 2.8 3.0  --      Lactic acid: 2.1<3.6<1.7<2.3    I reviewed the patient's new imaging including images and reports.    Chest x-ray 4/18/2023 shows bilateral left greater than right patchy airspace opacities with borderline  cardiomegaly and right upper extremity PICC line in place with tip in the SVC.    Medication Review:   cefepime, 1 g, Intravenous, Once  cefepime, 1 g, Intravenous, Q8H  citalopram, 10 mg, Oral, Daily  enoxaparin, 40 mg, Subcutaneous, Q PM  levothyroxine, 50 mcg, Oral, Q AM  megestrol, 800 mg, Oral, Daily  midodrine, 10 mg, Oral, TID AC  pantoprazole, 40 mg, Oral, QAM AC  prenatal vitamin, 1 tablet, Oral, Daily  sodium chloride, 10 mL, Intravenous, Q12H      phenylephrine, 0.5-3 mcg/kg/min, Last Rate: Stopped (04/18/23 0131)        Assessment & Plan         Metabolic encephalopathy    Hypothyroidism    Dementia    CKD stage 3, GFR 30-59 ml/min ([facility])    Sepsis due to urinary tract infection ([facility])    Atrial fibrillation with controlled rate ([facility])    Severe Malnutrition ([facility])    Hypoglycemia    Diastolic CHF, chronic    Left lower lobe pneumonia    Hypotension    Anemia    89 y.o. male with history of dementia, CKD 3, chronic HFpEF, hypothyroidism, discharged from [facility] to [facility] on 4/5/2023 after hospitalization for sepsis secondary to urinary tract infection requiring ICU admission and vasopressors.    Patient presented back to [facility] ED from [facility] on 4/17/2023 with AMS, hypoglycemia, and hypotension.  He was initially admitted to the hospital medicine service and started on Zosyn.  Despite 3 L IV fluids and albumin he continued to develop worsening hypotension and was transferred to the intensive care unit.  Patient required BiPAP due to increased work of breathing prior to transfer to the ICU.    Chest x-ray concerning for left lower lobe infiltrate.    Patient is currently off pressors.  Not requiring pressors.  Lactic acid level normalized.  Urine cultures growing greater than 100,000 CFU of Enterococcus switching to Rocephin.    Plan:  1.  For severe sepsis due to urinary source/pneumonia: Change Zosyn to cefepime.  Check sputum culture.  Monitor hemodynamics.  Pressors if needed.   Midodrine if tolerates.  2.  For encephalopathy/delirium on dementia: Likely secondary to sepsis and underlying dementia.  3.  For A-fib: Monitor.  Patient probably not appropriate for anticoagulation due to fall risk.  4.  For possible left lower lobe pneumonia: Cefepime.  MRSA screen negative.  5.  For hypoglycemia: Improved.  Discontinue D10.  Monitor blood sugars.  6.  Dysphagia: Speech evaluation.  Speech recommended n.p.o.  Family electing for comfort diet.  Check modified barium swallow.  Discussed with speech therapy.  7.  For hypothyroidism: Levothyroxine.  8.  For anemia: Macrocytic.  Check iron studies, B12, folate.  Daily prenatal vitamin starting tomorrow.  9.  Goals of care: Appreciate palliative assistance.  DNR/DNI.  Full support for now.      Patient is critically ill secondary to severe sepsis and has high risk of life-threatening decompensation in condition.   As such, the patient requires continuous monitoring and frequent reassessment for consideration of adjustment in management to minimize this risk.  I personally reassessed the patient multiple times today.    Critical care time : 40 minutes spent by me personally and independently.(This excludes time spent performing separately reportable procedures and services).  Critical care time includes high complexity decision making to assess, manipulate, and support vital organ system failure in this individual who has impairment of one or more vital organ systems such that there is a high probability of imminent or life threatening deterioration in the patient’s condition.      Electronically signed by:    Anish De La Fuente MD  04/19/23  16:00 EDT      *. Please note that portions of this note were completed with [employer] - a voice recognition program.

## 2023-04-19 NOTE — THERAPY RE-EVALUATION
Acute Care - Speech Language Pathology   Swallow Re-Evaluation [facility]     Clinical Swallow Evaluation       Patient Name: Jeramie Ortiz  : 12/15/1929  MRN: [id number]  Today's Date: 2023               Admit Date: 2023    Visit Dx:     ICD-10-CM ICD-9-CM   1. Sepsis secondary to UTI  A41.9 038.9    N39.0 995.91     599.0   2. HCAP (healthcare-associated pneumonia)  J18.9 486   3. Atrial fibrillation with RVR  I48.91 427.31   4. Anemia, unspecified type  D64.9 285.9   5. History of dementia  Z86.59 V11.8   6. Confusion  R41.0 298.9   7. Dysphagia, unspecified type  R13.10 787.20     Patient Active Problem List   Diagnosis   • Hypothyroidism   • Gout   • Essential hypertension   • Dementia   • CKD stage 3, GFR 30-59 ml/min ([facility])   • Septic shock   • Sepsis due to urinary tract infection (HCC)   • Altered mental status improved   • Atrial fibrillation with controlled rate (HCC)   • Severe Malnutrition (HCC)   • Metabolic encephalopathy   • Hypoglycemia   • Diastolic CHF, chronic   • Left lower lobe pneumonia   • Hypotension     Past Medical History:   Diagnosis Date   • CKD (chronic kidney disease) stage 3, GFR 30-59 ml/min 3/24/2023   • Dementia 3/24/2023   • Diastolic CHF, chronic 2023   • Essential hypertension 3/24/2023   • Gout 3/24/2023   • Hypothyroidism 3/24/2023     Past Surgical History:   Procedure Laterality Date   • CHOLECYSTECTOMY     • PROSTATE SURGERY     • REPLACEMENT TOTAL KNEE BILATERAL         SLP Recommendation and Plan  SLP Swallowing Diagnosis: oral dysphagia, suspected pharyngeal dysphagia (23 1030)  SLP Diet Recommendation: NPO, comfort diet, per physician discretion, other (see comments) (Comfort diet of pureed and thin liquids per MD discretion, until MBS) (23 1030)     SLP Rec. for Method of Medication Administration: meds via alternate route (23 1030)     Monitor for Signs of Aspiration: notify SLP if any concerns (23 1030)  Recommended  Diagnostics: VFSS ([facility]) (04/19/23 1030)  Swallow Criteria for Skilled Therapeutic Interventions Met: demonstrates skilled criteria (04/19/23 1030)  Anticipated Discharge Disposition (SLP): skilled nursing facility, anticipate therapy at next level of care (04/19/23 1030)     Therapy Frequency (Swallow): evaluation only (04/19/23 1030)  Predicted Duration Therapy Intervention (Days): until discharge (04/19/23 1030)                                               SWALLOW EVALUATION (last 72 hours)     SLP Adult Swallow Evaluation     Row Name 04/19/23 1030 04/18/23 1000                Rehab Evaluation    Document Type re-evaluation  - evaluation  -       Subjective Information no complaints  - --       Patient Observations alert;cooperative  - alert  -SM       Patient/Family/Caregiver Comments/Observations none present  - --       Patient Effort adequate  - --       Symptoms Noted During/After Treatment none  - --          General Information    Patient Profile Reviewed yes  -CH yes  -SM       Pertinent History Of Current Problem see prior evaluation. Re-evaluation completed as patient more alert and following commands. Clinical swallow eval was completed yesterday with recomendation for NPO v. comfort diet. Family opted for comfort diet. Current comfort diet is pureed and thin liquids.  -CH Adm increased confusion from baseline, poor PO intake. Recent sepsis/UTI. Dementia. Not following commands for medication administration. Palliaitve consult pending.  -SM       Current Method of Nutrition pureed;thin liquids;comfort diet  - regular textures;thin liquids  -       Precautions/Limitations, Hearing hearing impairment, bilaterally  -CH --       Prior Level of Function-Communication unknown  -CH --       Prior Level of Function-Swallowing unknown  -CH unknown  -SM       Plans/Goals Discussed with patient  -CH patient  -SM       Barriers to Rehab cognitive status;medically complex  - cognitive  status;medically complex  -       Patient's Goals for Discharge patient did not state  -CH patient did not state  -SM          Pain    Additional Documentation Pain Scale: FACES Pre/Post-Treatment (Group)  -CH Pain Scale: FACES Pre/Post-Treatment (Group)  -SM          Pain Scale: FACES Pre/Post-Treatment    Pain: FACES Scale, Pretreatment 0-->no hurt  -CH 2-->hurts little bit  -SM       Posttreatment Pain Rating 0-->no hurt  -CH 2-->hurts little bit  -SM          Oral Motor Structure and Function    Dentition Assessment natural, present and adequate  -CH --       Secretion Management WNL/WFL  -CH --       Mucosal Quality dry  -CH --       Volitional Swallow delayed;weak  -CH --          Oral Musculature and Cranial Nerve Assessment    Oral Motor General Assessment unable to assess  - unable to assess  -          General Eating/Swallowing Observations    Respiratory Support Currently in Use nasal cannula  - --       O2 Liters 2L  -CH --       Eating/Swallowing Skills fed by SLP  - --       Positioning During Eating upright 90 degree;upright in bed  - --       Utensils Used spoon;cup;straw  - --       Consistencies Trialed ice chips;thin liquids;nectar/syrup-thick liquids;honey-thick liquids;pureed  -CH --       Pre SpO2 (%) 95  -CH --       Post SpO2 (%) 94  -CH --          Respiratory    Respiratory Status WFL  - --          Clinical Swallow Eval    Oral Prep Phase impaired  - --       Oral Transit impaired  - --       Oral Residue WFL  - --       Pharyngeal Phase suspected pharyngeal impairment  - --       Clinical Swallow Evaluation Summary Prolonged oral manipulation with all trials. Oral holding prior to swallowing with all and increased prep time. Anterior loss with thin liquids. Delayed cough and throat clear with all consistencies. Continued throat clear for several min after trials. Unable to recommend a safe diet consistency. Family has chosen comfort diet at this time. MD requesting  MBS to determine safest diet. Comfort diet of pureed and thin liquids until MBS.  -CH Pt accepted minimal PO trials. Success with tsp H2O and taste, at most, of sherbet. Pulled away and refused all other attempts. Showing no interest in PO intake. Compromised respiratory status prior to trials, places at high risk aspiration. Would benefit from further palliative team discussion regarding GOC, with NPO vs comfort PO diet.  -          Oral Prep Concerns    Oral Prep Concerns oral holding;prolonged mastication;incomplete or weak lip closure around spoon;anterior loss;increased prep time  -CH --       Oral Holding all consistencies  -CH --       Prolonged Mastication pudding  -CH --       Incomplete or Weak Lip Closure Around Spoon all consistencies  -CH --       Anterior Loss thin;nectar;honey  -CH --       Increased Prep Time all consistencies  -CH --          Oral Transit Concerns    Oral Transit Concerns delayed initiation of bolus transit;increased oral transit time  -CH --       Delayed Intiation of Bolus Transit all consistencies  -CH --       Increased Oral Transit Time all consistencies  -CH --          Pharyngeal Phase Concerns    Pharyngeal Phase Concerns cough;throat clear  - --       Cough all consistencies  -CH --       Throat Clear all consistencies  -CH --          Swallowing Quality of Life Assessment    Education and counseling provided Signs of aspiration;Risks of aspiration;Comfort diet options  - --          SLP Evaluation Clinical Impression    SLP Swallowing Diagnosis oral dysphagia;suspected pharyngeal dysphagia  - oral dysphagia;suspected pharyngeal dysphagia  -       Functional Impact risk of aspiration/pneumonia;risk of malnutrition;risk of dehydration  -CH risk of aspiration/pneumonia;risk of malnutrition;risk of dehydration  -       Swallow Criteria for Skilled Therapeutic Interventions Met demonstrates skilled criteria  -CH demonstrates skilled criteria  -           Recommendations    Therapy Frequency (Swallow) evaluation only  - --       Predicted Duration Therapy Intervention (Days) until discharge  - --       SLP Diet Recommendation NPO;comfort diet, per physician discretion;other (see comments)  Comfort diet of pureed and thin liquids per MD discretion, until [facility]  - NPO;comfort diet, per physician discretion;other (see comments)  NPO for now. pending GOC/POC discussion. If proceed with comfort PO diet, suggest puree diet and if any signs discomfort with thin liquids, adjust to nectar-thick liquids if showing no aversion.  -       Recommended Diagnostics VFSS ([facility])  - reassess via clinical swallow evaluation  -       Oral Care Recommendations Oral Care BID/PRN;Suction toothbrush  - Oral Care BID/PRN;Suction toothbrush  -       SLP Rec. for Method of Medication Administration meds via alternate route  - meds via alternate route  if proceed PO means, suggest in puree or ice cream, crush prn  -       Monitor for Signs of Aspiration notify SLP if any concerns  - notify SLP if any concerns  -       Anticipated Discharge Disposition (SLP) skilled nursing facility;anticipate therapy at next level of care  - skilled nursing facility;anticipate therapy at next level of care  -             User Key  (r) = Recorded By, (t) = Taken By, (c) = Cosigned By    Initials Name Effective Dates     Aylin Lam, MS CCC-SLP 02/03/23 -      Johanna Nugent MS CCC-SLP 06/16/21 -                 EDUCATION  The patient has been educated in the following areas:   Dysphagia (Swallowing Impairment) Oral Care/Hydration NPO rationale.              Time Calculation:    Time Calculation- SLP     Row Name 04/19/23 1323             Time Calculation- SLP    SLP Start Time 1030  -CH      SLP Received On 04/19/23  -         Untimed Charges    SLP Eval/Re-eval  ST Eval Oral Pharyng Swallow - 59370  -      66571-GH Eval Oral Pharyng Swallow Minutes 55  -CH          Total Minutes    Untimed Charges Total Minutes 55  -CH       Total Minutes 55  -CH            User Key  (r) = Recorded By, (t) = Taken By, (c) = Cosigned By    Initials Name Provider Type    Johanna Hernandez MS CCC-SLP Speech and Language Pathologist                Therapy Charges for Today     Code Description Service Date Service Provider Modifiers Qty    [id number] HC ST EVAL ORAL PHARYNG SWALLOW 4 4/19/2023 Johanna Nugent MS CCC-SLP GN 1               Johanna Nugent MS CCC-LATASHA  4/19/2023

## 2023-04-19 NOTE — CASE MANAGEMENT/SOCIAL WORK
Continued Stay Note  [facility]     Patient Name: Jeramie Ortiz  MRN: [id number]  Today's Date: 4/19/2023    Admit Date: 4/17/2023    Plan: SNF   Discharge Plan     Row Name 04/19/23 1022       Plan    Plan SNF    Patient/Family in Agreement with Plan yes    Plan Comments I have spoken to Nelson, Mr. Ortiz's son today regarding the discharge plan.  He states that he realizes his dad likely will not be appropriate to return to his previous assisted living accomodations since he has been requiring two person assistance with activities of daily living.  He requests referals be submitted to [facility] and states that he should be on their waiting list already.  He plans for his dad to reside at the [facility] permanently and understands that they do not have medicaid beds available.  I have explained that they would require private payment after skilled rehab completed.  He verbalizes understanding.  I have submitted this referral to Cecy at [facility] who will review.  CM will cont to follow the plan of care and assist with discharge needs as recommendations become available.    Final Discharge Disposition Code 03 - skilled nursing facility (SNF)               Discharge Codes    No documentation.                     Brittany Woodard RN

## 2023-04-19 NOTE — PROGRESS NOTES
Clinical Nutrition       Reason for Visit: MDR, Identified at risk by screening criteria, Malnutrition Severity Assessment      Patient Name: Jeramie Ortiz  YOB: 1929  MRN: [id number]  Date of Encounter: 04/19/23 17:32 EDT  Admission date: 4/17/2023  Nutrition Assessment   Admission Diagnosis:  AMS (altered mental status) [R41.82]      Problem List:    Metabolic encephalopathy    Hypothyroidism    Dementia    CKD stage 3, GFR 30-59 ml/min (HCC)    Sepsis due to urinary tract infection (HCC)    Atrial fibrillation with controlled rate (HCC)    Severe Malnutrition (HCC)    Hypoglycemia    Diastolic CHF, chronic    Left lower lobe pneumonia    Hypotension    Anemia        PMH:   He  has a past medical history of CKD (chronic kidney disease) stage 3, GFR 30-59 ml/min (3/24/2023), Dementia (3/24/2023), Diastolic CHF, chronic (4/17/2023), Essential hypertension (3/24/2023), Gout (3/24/2023), and Hypothyroidism (3/24/2023).    PSH:  He  has a past surgical history that includes Replacement total knee bilateral; Prostate surgery; and Cholecystectomy.      Applicable Nutrition Concerns:   Skin:MASD- coccyx, B gluteals  GI:abdomen soft, nontender, last bm 4-17      Applicable Interval History:        SLP Recommendation and Plan  SLP Swallowing Diagnosis: oral dysphagia, suspected pharyngeal dysphagia (04/19/23 1030)  SLP Diet Recommendation: NPO, comfort diet, per physician discretion, other (see comments) (Comfort diet of pureed and thin liquids per MD discretion, until MBS) (04/19/23 1030)  SLP Rec. for Method of Medication Administration: meds via alternate route (04/19/23 1030)  Monitor for Signs of Aspiration: notify SLP if any concerns (04/19/23 1030)  Recommended Diagnostics: VFSS (MBS) (04/19/23 1030)  Swallow Criteria for Skilled Therapeutic Interventions Met: demonstrates skilled criteria (04/19/23 1030)  Anticipated Discharge Disposition (SLP): skilled nursing facility, anticipate  therapy at next level of care (04/19/23 1030)  Therapy Frequency (Swallow): evaluation only (04/19/23 1030)  Predicted Duration Therapy Intervention (Days): until discharge (04/19/23 1030)       Reported/Observed/Food/Nutrition Related History:     Per RN: pt very [place], is more alert today, but disoriented, was able to brush is teeth, coughed when he drank his boost, seems to do better with thicker consistencies, ate some applesauce, swelling is better than yesterday, now down to 3+ edema    Per SLP plan for MBS tomorrow    Pt resting in bed, on room air, is pleasantly confused, very frail, observe temporal, buccal wasting, arms + legs are quite edematous    Per son: pt used to weigh 220lb's last year ~September, he had diarrhea for several months starting after Thanksgiving, and he began losing weight because he ate less, because he did not want to have diarrhea,  the diarrhea has now resolved since his last admission when he started cholelystyramine      Labs    Labs Reviewed: Yes     Results from last 7 days   Lab Units 04/19/23  0406 04/18/23  0448 04/17/23 1937 04/17/23  1044   GLUCOSE mg/dL 152* 135* 69 62*   BUN mg/dL 19 18 16 16   CREATININE mg/dL 0.92 0.94 0.99 0.89   SODIUM mmol/L 144 141 144 138   CHLORIDE mmol/L 114* 112* 114* 109*   POTASSIUM mmol/L 3.4* 3.8 3.7 3.6   PHOSPHORUS mg/dL 2.8 3.0  --   --    MAGNESIUM mg/dL 2.0 1.9 1.6* 1.8   ALT (SGPT) U/L  --   --   --  9       Results from last 7 days   Lab Units 04/17/23  1044   ALBUMIN g/dL 2.4*       Results from last 7 days   Lab Units 04/19/23  1625 04/19/23  1055 04/19/23  0713 04/19/23  0022 04/18/23  2111 04/18/23  2052   GLUCOSE mg/dL 125 123 131* 150* 147* 150*     No results found for: HGBA1C      Results from last 7 days   Lab Units 04/17/23 1937 04/17/23  1044   PROBNP pg/mL 11,689.0* 10,105.0*         Medications    Medications Reviewed: Yes  Pertinent: abx, lovenox, megace, protonix, midodrine, prenatal MVI        Intake/Ouptut 24 hrs  (0701 - 0700)   I&O's Reviewed: Yes     Intake & Output (last day)       04/18 0701  04/19 0700 04/19 0701 04/20 0700    I.V. (mL/kg) 1326 (15.4) 213 (2.5)    IV Piggyback 200 115    Total Intake(mL/kg) 1526 (17.7) 328 (3.8)    Urine (mL/kg/hr) 125 (0.1) 25 (0)    Stool 0     Total Output 125 25    Net +1401 +303          Urine Unmeasured Occurrence 2 x     Stool Unmeasured Occurrence 1 x             Anthropometrics       Height: 69in  Weight: 190lb bedscale (may be skewed by fluid)  BMI: 28 ( may be skewed by fluid)  BMI classification: Overweight: 25.0-29.9kg/m2     Weight change: 30lb wt loss since November per EMR,  14% wt loss, wt loss likely greater than this as pt has significant edema at present and bedscale weight may not reflect dry weight, possible some fluid gain could be from CHF, but unlikely that CHF is the primary cause of such significant edema, most  likely due to malnutrition       Last 15 Recorded Weights    Open Review Flowsheets (more data may exist)    Weight Weight (kg) Weight (lbs) Weight Method                4/17/2023     86.183 kg     190 lb            4/1/2023     86.501 kg     190 lb 11.2 oz     Bed scale       3/31/2023     82.645 kg     82.9 kg     182 lb 3.2 oz     182 lb 12.2 oz     Bed scale       3/28/2023     90.4 kg     199 lb 4.7 oz     Bed scale       3/27/2023     87.1 kg     192 lb 0.3 oz     Bed scale       3/26/2023     88.905 kg     89 kg     196 lb     196 lb 3.4 oz     Bed scale       3/25/2023     80.3 kg     177 lb 0.5 oz     Bed scale       3/24/2023     77.5 kg     98.884 kg     170 lb 13.7 oz     218 lb     Bed scale     Stated         Nutrition Focused Physical Exam     Date: 4-19-23  Patient meets criteria for malnutrition diagnosis, see MSA note.    Current Nutrition Prescription     PO: Regular diet (comfort Diet)    Intake: insufficient data    Nutrition Diagnosis   Date: 4-19-23 Updated:   Problem Malnutrition    Etiology Per Clinical Status:  Dementia/DIarrhea   Signs/Symptoms 30lb wt loss over 5 months, severe muscle wasting, fat loss, severe edema 3+     Problem Swallowing difficulty   Etiology Per Clinical Status   Signs/Symptoms Per SLP EVal       Goal:   General: Nutrition support treatment  PO: Tolerate PO    Nutrition Intervention      Follow treatment progress, Interview for preferences, Encourage intake, Supplement provided    Will change to puree diet per SLP recs for comfort diet, as pt coughing with thin liquids this am, adjusted liquids to nectar for now, await MBS    Add Boost Breeze 3x/day as pt liked these on last admission, may try Premier Protein as well since son reports pt will drink these shakes too    Consider resuming cholestyramine if diarrhea becomes a problem again    Pt meets criteria for severe chronic malnutrition with 30lb wt loss over 5 months, 14% wt loss,  severe muscle wasting fat loss, severe edema 3+      Monitoring/Evaluation:   Per protocol, I&O, PO intake, Supplement intake, Pertinent labs, Weight, Skin status, GI status, Symptoms, POC/GOC, Swallow function      Rosario Mace, REA  Time Spent: 60min

## 2023-04-19 NOTE — PLAN OF CARE
Goal Outcome Evaluation:  Plan of Care Reviewed With: patient    SLP re-evaluation completed. Will continue to address dysphagia via MBS as appropriate. Please see note for further details and recommendations.

## 2023-04-20 NOTE — SIGNIFICANT NOTE
Unable to monitor continuous pulse oximeter:    Throughout this shift, nursing has attempted continuous pulse oximeter monitoring with multiple forehead, finger, toe, and ear lobe probes. Pulse oximeter waveform is only adequate on patient's ear lobe intermittently. When waveform is good on the ear, patient's saturation >95%.    Also, patient's agitation and confusion has increased throughout the night. He is uncooperative with care and  frequently removes pulse oximeter probe. His  agitation worsens with attempts to replace probe. No improvement with seroquel prn. He does rest somewhat between care and without stimulation.    Attempted to reapply pulse oximeter probe for > 30 minutes. Unable to reapply due to patient's agitation increasing. He cursed, grabbed my hands, threatened to hit me, and attempted to bite me while attempting to replace ear probe.  Notified APRN that we are currently unable to monitor pulse oximeter continuously.     See orders. Will attempt to spot check pulse oximeter every hour per APRN order.

## 2023-04-20 NOTE — PLAN OF CARE
Goal Outcome Evaluation:   Pt agitated and uncooperative with all care throughout the night, refused to let nursing staff change oxygen probe, see prior note. Oxygen stats >92% with appropriate waveform. PRN Seroquel given for agitation at 0120, no improvement. Pt had 4 small incontinence urinary episodes. Unable to measure because patient will not keep external catheter in place. 4 smear-small incontinent bowel movements. PRN duo-nebs given by respiratory for audible expiratory wheezes.

## 2023-04-20 NOTE — SIGNIFICANT NOTE
04/20/23 1211   SLP Deferred Reason   SLP Deferred Reason   (Pt was scheduled for MBS today per Dr. De La Fuente's request. Spoke to RN this AM who reported pt not appropriate to participate. Noted that Palliative APRN discontinued MBS order. Will f/u for further education/consultation, as appropriate.)        never used

## 2023-04-20 NOTE — NURSING NOTE
"RN and charge nurse spent appox 1 hour attempting to get a SpO2 reading     Patient it combative, angry, threatening and physically abusive to staff. Patient Stated \" Leave me alone\" \"Why don't you guys go home and stop messing with me\" \"if I had a gun I would get you\" Patient attempting to bite, kick and punch staff. Patient refuses to follow commands, take medications or comply with care.     @0824 Son Nelson Ortiz was called and made aware of situation, he was notified patient was not appropriate for barium swallow, and was refusing all care including ADL care such as Medications, lab draws, nutrition, turning and elimination asistance.     Son was asked if he would like us to proceed with aggressive treatment including but not limited to NG and restraining patient. He said he believes that would not be appropriate or in line with patients wishes but \"needs a minute to converse with wife and make decision\" Awaiting response     When asked if he would be coming to see patient today he stated \" Later this afternoon\" He was asked if there was anyway he could come sooner as patient was asking for family. awaiting response     @0829 Spoke to Palliative care team to update. Updated charge RN   "

## 2023-04-20 NOTE — PLAN OF CARE
Problem: Palliative Care  Goal: Enhanced Quality of Life  Intervention: Promote Advance Care Planning  Recent Flowsheet Documentation  Taken 4/20/2023 1038 by Rosario Ryan MSW  Life Transition/Adjustment:   decision-making facilitated   end-of-life care initiated  Intervention: Optimize Psychosocial Wellbeing  Recent Flowsheet Documentation  Taken 4/20/2023 1300 by Rosario Ryan MSW  Supportive Measures: (Palliative IDT: DO; APRN; LCSW; RN; MDiv) --  Taken 4/20/2023 1038 by Rosario Ryan MSW  Supportive Measures: (d/w patient's son regarding goals of care/plan of care)   active listening utilized   decision-making supported   positive reinforcement provided   verbalization of feelings encouraged  Family/Support System Care:   support provided   self-care encouraged   caregiver stress acknowledged  Patient's son updated on patient's status/agitation/decline by FELIX Zaidi RN - as a result, Palliative plan of care conference with patient's son, son-in-law, ABILIO Salas APRN.  Discussed patient's status/prognosis, increased confusion, weakness, agitation/restlessness.  Goal of comfort established, code status changed  and comfort medications ordered per ABILIO Salas.  Palliative Care will continue to follow for support and assist with plan of care and symptom management.      1300 Palliative IDT:  DO; APRN; CANDIEW; RN; MDiv

## 2023-04-20 NOTE — PLAN OF CARE
Goal Outcome Evaluation:               Patient's son and patient's son's  bedside during  visit. They were in good spirits and seemed at peace with the situation. They had no requests and were comfortable in the room. Pastoral care available to family if needed.

## 2023-04-20 NOTE — PROGRESS NOTES
"Palliative Care Daily Progress Note     C/C: Patient restless in bed.     S: Medical record reviewed. Follow up visit for GOC in the context of complex medical decision making. Events noted. Son and son's spouse at bedside. Due to patient's decline, restlessness, son electing comfort measures.     ROS: Patient denies pain however facial grimacing at times. ROS limited by AMS.     O: Code Status:   Code Status and Medical Interventions:   Ordered at: 04/20/23 1147     Level Of Support Discussed With:    Next of Kin (If No Surrogate)     Code Status (Patient has no pulse and is not breathing):    No CPR (Do Not Attempt to Resuscitate)     Medical Interventions (Patient has pulse or is breathing):    Comfort Measures      Advanced Directives: Advance Directive Status: Patient has advance directive, copy in chart   Goals of Care: Ongoing.   Palliative Performance Scale Score: 30%     /63   Pulse 101   Temp 97.8 °F (36.6 °C) (Axillary)   Resp 20   Ht 175.3 cm (69\")   Wt 86.2 kg (190 lb)   SpO2 93%   BMI 28.06 kg/m²     Intake/Output Summary (Last 24 hours) at 4/20/2023 1148  Last data filed at 4/20/2023 0600  Gross per 24 hour   Intake 379.2 ml   Output --   Net 379.2 ml       PE:  General Appearance:    Patient laying in bed, awakens easily, alert,    HEENT:    NC/AT, EOMI, anicteric, MM dry, face relaxed, NC in place   Neck:   supple, trachea midline, no JVD   Lungs:     CTA bilat, diminished in bases; respirations regular, even and unlabored; RR 16-18 on exam, 2LNC    Heart:    RRR, normal S1 and S2, no M/R/G,  on monitor   Abdomen:     Normal bowel sounds, soft, nontender, nondistended   G/U:   Deferred   MSK/Extremities:   Wasting, +2 edema to BUE's, BLE's   Pulses:   Pulses palpable and equal bilaterally   Skin:   Warm, dry   Neurologic:   Alert to self only,    Psych:   restless, agitated       Meds: Reviewed and changes noted    Labs:   Results from last 7 days   Lab Units 04/20/23  0439   WBC " 10*3/mm3 8.79   HEMOGLOBIN g/dL 7.8*   HEMATOCRIT % 26.3*   PLATELETS 10*3/mm3 131*     Results from last 7 days   Lab Units 04/20/23  0439   SODIUM mmol/L 142   POTASSIUM mmol/L 3.7   CHLORIDE mmol/L 111*   CO2 mmol/L 18.0*   BUN mg/dL 21   CREATININE mg/dL 0.99   GLUCOSE mg/dL 92   CALCIUM mg/dL 8.6     Results from last 7 days   Lab Units 04/20/23  0439 04/17/23  1937 04/17/23  1044   SODIUM mmol/L 142   < > 138   POTASSIUM mmol/L 3.7   < > 3.6   CHLORIDE mmol/L 111*   < > 109*   CO2 mmol/L 18.0*   < > 15.0*   BUN mg/dL 21   < > 16   CREATININE mg/dL 0.99   < > 0.89   CALCIUM mg/dL 8.6   < > 8.2   BILIRUBIN mg/dL  --   --  1.5*   ALK PHOS U/L  --   --  1,524*   ALT (SGPT) U/L  --   --  9   AST (SGOT) U/L  --   --  23   GLUCOSE mg/dL 92   < > 62*    < > = values in this interval not displayed.     Imaging Results (Last 72 Hours)     Procedure Component Value Units Date/Time    XR Chest 1 View [[id number]] Collected: 04/19/23 0748     Updated: 04/19/23 0753    Narrative:      XR CHEST 1 VW    Date of Exam: 4/19/2023 4:30 AM EDT    Indication: f/u respiratory illness..    Comparison: 4/18/2023    Findings:  There is a stable right-sided PICC. Stable bilateral airspace disease and likely small to moderate left and small right pleural effusions. Heart size is normal. Pulmonary vasculature is centrally indistinct. Diaphragm is indistinct. No acute osseous   abnormality.      Impression:      Impression:  Stable patchy bilateral airspace disease with stable bilateral pleural effusions.      Electronically Signed: Don Busch    4/19/2023 7:50 AM EDT    Workstation ID: [id number]    XR Chest 1 View [[id number]] Collected: 04/18/23 1042     Updated: 04/18/23 1049    Narrative:      XR CHEST 1 VW    Date of Exam: 4/18/2023 10:22 AM EDT    Indication: PICC placement.    Comparison: 4/17/2023    Findings:  Right upper extremity PICC line. The tip appears to terminate at the confluence of the brachiocephalic veins. There are  small bilateral pleural effusions and bibasilar opacities either atelectasis or pneumonia. Superimposed pulmonary edema is suspected.   Cardiac and mediastinal contours are within normal limits. Sclerotic lesion within the right humeral head is unchanged.      Impression:      Impression:    1. Right upper extremity PICC line with tip at the confluence of the brachiocephalic veins.  2. Bilateral pleural effusions and bibasilar opacities either atelectasis or pneumonia. There is superimposed pulmonary edema.  3. Sclerotic lesion within the right humeral head. Recommend dedicated plain films of the shoulder for better evaluation.      Electronically Signed: Miguel Wen    4/18/2023 10:46 AM EDT    Workstation ID: [id number]    XR Chest 1 View [[id number]] Collected: 04/17/23 1954     Updated: 04/17/23 1958    Narrative:      XR CHEST 1 VW    Date of Exam: 4/17/2023 7:32 PM EDT    Indication: RRT.    Comparison: 4/17/2023.    Findings:  Consolidative changes are present within the left lower lobe with left-sided pleural effusion present. Small right-sided pleural effusion present with probable mild right basilar atelectasis. The heart appears mildly enlarged. The pulmonary vasculature   appears indistinct. No pneumothorax. No acute osseous abnormality identified.      Impression:      Impression:  Left lower lobe pneumonia appears more consolidated as compared to the previous study. Stable bilateral pleural effusions present with overlying right basilar atelectasis with probable mild pulmonary edema pattern.    Electronically Signed: Vi Miles    4/17/2023 7:54 PM EDT    Workstation ID: [id number]                Diagnostics: Reviewed    A:   Metabolic encephalopathy    Hypothyroidism    Dementia    CKD stage 3, GFR 30-59 ml/min (HCC)    Sepsis due to urinary tract infection (HCC)    Atrial fibrillation with controlled rate (HCC)    Severe Malnutrition (HCC)    Hypoglycemia    Diastolic CHF, chronic    Left lower lobe  pneumonia    Hypotension    Anemia     89 y.o. male with sepsis secondary to UTI, CHF, LLL PNA.      S/S:   1. AMS/anxiety/agitation -metabolic encephalopathy    -continue Lorazepam 1mg q 4 hours prn anxiety/agitation  -started Haldol 2mg IV q 6 hours prn anxiety/agitation    2. Pain -patient unable to verbalize location  -started Morphine 5mg PO q 4 hours prn pain  -started Morphine 2mg IV q 2 hours prn pain    3. Dysphagia -comfort diet     4. Debility -?new baseline     5. GOC -DNR/DNI/Comfort Measures -per discussion with son  -continue ABX only, transition to comfort focused plan of care    P: Follow up visit for GOC. Son reports electing comfort measures. Discussed discontinuation of telemetry, labs, IV fluids. Plan to continue IV ABX until completed course. Transfer to  today. To determine if patient stable for discharge home with hospice versus inpatient hospice. All questions and concerns addressed.   Palliative Care Team will continue to follow patient. Please do not hesitate to contact us regarding further sx mgmt or GOC needs.  Hilda Salas, APRN  4/20/2023  Time spent: 35 minutes

## 2023-04-21 NOTE — NURSING NOTE
Patient coughed quite a bit immediately after admin of PO med's (crushed in apple sauce) and proceeded to grunt and try to clear his throat periodically for about an hour afterwards (no signs of distress but appeared uncomfortable). IV Haldol and IV morphine given- pt responded well and now resting comfortably (arousable and able to communicate). Palliative team notified.

## 2023-04-21 NOTE — SIGNIFICANT NOTE
04/21/23 0853   SLP Deferred Reason   SLP Deferred Reason Routine  (Spoke w/ RN, no further SLP needs at this time. Will sign off. Please reconsult if any additional needs.)

## 2023-04-21 NOTE — PROGRESS NOTES
Pulmonary/Critical Care Follow-up     LOS: 3 days   Patient Care Team:  Kingsley Downing MD as PCP - General (Family Medicine)  James Manzo MD as Consulting Physician (Cardiology)      Chief Complaint   Patient presents with   • Altered Mental Status     Subjective      History reviewed and updated in EMR as indicated.    Interval History:     Patient more confused and agitated overnight.  No fevers or chills.  Family came in and decided on comfort measures.  Tolerating room air.    History taken from: Chart, staff    PMH/FH/Social History were reviewed and updated appropriately in the electronic medical record.     Review of Systems:    Review of 14 systems was completed with positives and pertinent negatives noted in the subjective section.  All other systems reviewed and are negative.         Objective     Vital Signs  Temp:  [97.6 °F (36.4 °C)-97.8 °F (36.6 °C)] 97.8 °F (36.6 °C)  Heart Rate:  [] 124  Resp:  [18-20] 20  BP: ()/() 103/63  04/19 0701 - 04/20 0700  In: 592.2 [P.O.:120; I.V.:357.2]  Out: 25 [Urine:25]  Body mass index is 28.06 kg/m².     IV drips:      Physical Exam:     Constitutional:   Confused, in no acute distress   Head:   Normocephalic, atraumatic   Eyes:           Lids and lashes normal, conjunctivae and sclerae normal.  PER   ENMT:  Ears appear intact with no abnormalities noted     Lips normal.     Neck:  Trachea midline, no JVD   Lungs/Resp:    Normal effort, symmetric chest rise, no crepitus, clear to      auscultation bilaterally.               Heart/CV:    Irregularly irregular and mildly tachycardic, no murmur   Abdomen/GI:    Soft, nontender, nondistended   :    Deferred   Extremities/MSK:  No clubbing or cyanosis.  Trace bilateral lower extremity edema and 1+ upper extremity edema.     Pulses:  Pulses palpable and equal bilaterally   Skin:  No bleeding, bruising or rash   Heme/Lymph:  No cervical or supraclavicular adenopathy.    Neurologic:    Psychiatric:    Moves all extremities with no obvious focal motor deficit.  Confused and a bit restless.  Speech dysarthric.  A bit restless, not currently agitated.    The above physical exam findings were reviewed and reflect my exam findings as of today's exam.   Electronically signed by:  Anish De La Fuente MD  04/20/23  20:40 EDT      Results Review:     I reviewed the patient's new clinical results.   Results from last 7 days   Lab Units 04/20/23 0439 04/19/23 1745 04/19/23 0406 04/18/23 0448 04/17/23 1937 04/17/23  1044   SODIUM mmol/L 142  --  144 141   < > 138   POTASSIUM mmol/L 3.7 4.4 3.4* 3.8   < > 3.6   CHLORIDE mmol/L 111*  --  114* 112*   < > 109*   CO2 mmol/L 18.0*  --  18.0* 16.0*   < > 15.0*   BUN mg/dL 21  --  19 18   < > 16   CREATININE mg/dL 0.99  --  0.92 0.94   < > 0.89   CALCIUM mg/dL 8.6  --  8.0* 7.7*   < > 8.2   BILIRUBIN mg/dL  --   --   --   --   --  1.5*   ALK PHOS U/L  --   --   --   --   --  1,524*   ALT (SGPT) U/L  --   --   --   --   --  9   AST (SGOT) U/L  --   --   --   --   --  23   GLUCOSE mg/dL 92  --  152* 135*   < > 62*    < > = values in this interval not displayed.     Results from last 7 days   Lab Units 04/20/23 0439 04/19/23 0406 04/18/23 0448   WBC 10*3/mm3 8.79 12.03* 10.69   HEMOGLOBIN g/dL 7.8* 7.7* 7.7*   HEMATOCRIT % 26.3* 25.4* 25.9*   PLATELETS 10*3/mm3 131* 133* 141     Results from last 7 days   Lab Units 04/17/23 1930   PH, ARTERIAL pH units 7.440   PO2 ART mm Hg 231.0*   PCO2, ARTERIAL mm Hg 24.2*   HCO3 ART mmol/L 16.4*     Results from last 7 days   Lab Units 04/19/23  0406 04/18/23  0448 04/17/23  1937   MAGNESIUM mg/dL 2.0 1.9 1.6*   PHOSPHORUS mg/dL 2.8 3.0  --      Lactic acid: 2.1<3.6<1.7<2.3    I reviewed the patient's new imaging including images and reports.    Chest x-ray 4/19/2023 shows stable bilateral left greater than right patchy airspace opacities with borderline cardiomegaly and right upper extremity PICC line in  place with tip in the SVC.    Medication Review:   cefepime, 2 g, Intravenous, Q12H  citalopram, 10 mg, Oral, Daily  enoxaparin, 40 mg, Subcutaneous, Q PM  levothyroxine, 50 mcg, Oral, Q AM  megestrol, 800 mg, Oral, Daily  midodrine, 10 mg, Oral, TID AC  pantoprazole, 40 mg, Oral, QAM AC  prenatal vitamin, 1 tablet, Oral, Daily  sodium chloride, 10 mL, Intravenous, Q12H           Assessment & Plan         Metabolic encephalopathy    Hypothyroidism    Dementia    CKD stage 3, GFR 30-59 ml/min ([facility])    Sepsis due to urinary tract infection ([facility])    Atrial fibrillation with controlled rate ([facility])    Severe Malnutrition ([facility])    Hypoglycemia    Diastolic CHF, chronic    Left lower lobe pneumonia    Hypotension    Anemia    89 y.o. male with history of dementia, CKD 3, chronic HFpEF, hypothyroidism, discharged from [facility] to [facility] on 4/5/2023 after hospitalization for sepsis secondary to urinary tract infection requiring ICU admission and vasopressors.    Patient presented back to [facility] ED from [facility] on 4/17/2023 with AMS, hypoglycemia, and hypotension.  He was initially admitted to the hospital medicine service and started on Zosyn.  Despite 3 L IV fluids and albumin he continued to develop worsening hypotension and was transferred to the intensive care unit.  Patient required BiPAP due to increased work of breathing prior to transfer to the ICU.    Chest x-ray concerning for left lower lobe infiltrate.    Patient now on pressors.  Lactic acid level normalized.  Urine cultures growing greater than 100,000 CFU of Enterococcus switched to Rocephin.  Family has decided on comfort measures but continuing antibiotics.    Plan:  1.  For severe sepsis due to urinary source/pneumonia: Continue cefepime.  Transferred to palliative.  2.  For encephalopathy/delirium on dementia: Likely secondary to sepsis and underlying dementia.  3.  For A-fib: Monitor.  Patient probably not appropriate for  anticoagulation due to fall risk.  4.  For possible left lower lobe pneumonia: Cefepime.  MRSA screen negative.  5.  For hypoglycemia: Improved.  Now palliative.  6.  Dysphagia: Speech evaluation.  Comfort diet.    7.  For hypothyroidism: Levothyroxine.  8.  For anemia: Macrocytic.  Check iron studies, B12, folate.  Daily prenatal vitamin starting tomorrow.  9.  Goals of care: Appreciate palliative assistance.  DNR/DNI.  Comfort measures, antibiotics okay.        Electronically signed by:    Anish De La Fuente MD  04/20/23  20:40 EDT      *. Please note that portions of this note were completed with [employer] - a voice recognition program.

## 2023-04-21 NOTE — PROGRESS NOTES
"Continued Stay Note  [facility]     Patient Name: Jeramie Ortiz  MRN: [id number]  Today's Date: 4/21/2023    Admit Date: 4/17/2023    Plan: Inpatient   Discharge Plan     Row Name 04/21/23 1503       Plan    Plan Inpatient    Plan Comments Inpatient hospice consulted by Hilda COOLEY.  Hospice RN and SW met with son, son-in-law, and niece and discussed inpatient hospice services.  They are electing this benefit and are agreeable with a comfort focused plan of care.  Admission approved by Sabina COOLEY for skilled nursing assessment, medication titration, and injectable medication administration for palliation of pain and restlessness.  Dr. De La Fuente aware of discharge/readmit to hospice.  Coordinated care with nurse, Patti.  On assessment, patient is restless with intermittent moaning.  IV Morphine was given prior to this visit.  Asked nurse to give prn IV Haldol, which she does.  Admission paperwork signed by sonNelson.  Patient will transfer to  when a bed is available.  For further assist, call ext [phone].    Final Discharge Disposition Code 51 - hospice medical facility    Row Name 04/21/23 1226       Plan    Plan TBD    Plan Comments Per Palliative Care/ goals of care discussion with son, \"Discussed patient's status/prognosis, increased confusion, weakness, agitation/restlessness.  Goal of comfort established, code status changed  and comfort medications ordered.\" Mr. Ortiz will likely be transferred to  soon.  Per Cecy with [facility], they do not have long term care beds available at this time.  CM will cont to follow the plan of care and assist with discharge planning as recommendations become available.    Final Discharge Disposition Code 30 - still a patient               Discharge Codes    No documentation.               Expected Discharge Date and Time     Expected Discharge Date Expected Discharge Time    Apr 21, 2023             Tiana Mata RN    "

## 2023-04-21 NOTE — H&P
Hospice History and Physical     Patient Name:  Jeramie Ortiz   : 12/15/1929   Sex: male    Patient Care Team:  Kingsley Downing MD as PCP - General (Family Medicine)  James Manzo MD as Consulting Physician (Cardiology)    Code Status: Comfort measures    Subjective     89 yoM presented to ED 23 for lethargy and worsening confusion.  Pt had previously been admitted to [facility] 3/24/23-23 for septic shock 2* urosepsis.  He was discharged to [facility] on 23.   Prior to that extended hospitalization, pt was living alone at [facility], ambulatory with walker and able to perform his own ADL's.      PMH significant for: dementia, stage III CKD, diastolic CHF, HTN, gout, recurrent UTI, a fib, and hypothyroidism.      Work up revealed sepsis secondary to UTI, metabolic encephalopathy, hypotension. Pt was hypotensive and hypoglycemic in ED. Rapid response called for the patient on 23.     Throughout admission, pt continued to decline.  No interest in PO intake.  Having intermittent periods of confusion, agitation.  Required restraints at one point.  Palliative care consulted for goals of care conversation.      Son Nelson is pt decision maker elected comfort measures.  He does desire to continue IV abx for remaining doses.  He understands pt overall condition and that decline is very likely, we discussed that patient may not tolerate the fluid and son is in agreement to discontinue antibiotics should pt begin having s/s of FVO.  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  He desires to give pt every opportunity for improvement but verbalizes understanding that this is unlikely at this point in disease process.      Pt admitted to inpatient hospice service today for metabolic encephalopathy.       Review of Systems  Review of Systems   Unable to perform ROS: Acuity of condition        History  Past Medical History:   Diagnosis Date   • CKD (chronic kidney disease) stage 3, GFR 30-59 ml/min 3/24/2023   • Dementia 3/24/2023   • Diastolic CHF, chronic 4/17/2023   • Essential hypertension 3/24/2023   • Gout 3/24/2023   • Hypothyroidism 3/24/2023     Past Surgical History:   Procedure Laterality Date   • CHOLECYSTECTOMY     • PROSTATE SURGERY     • REPLACEMENT TOTAL KNEE BILATERAL       Current Facility-Administered Medications   Medication Dose Route Frequency Provider Last Rate Last Admin   • acetaminophen (TYLENOL) tablet 650 mg  650 mg Oral Q4H PRN Sabina Youngblood APRN        Or   • acetaminophen (TYLENOL) 160 MG/5ML solution 650 mg  650 mg Oral Q4H PRN Sabina Youngblood APRN        Or   • acetaminophen (TYLENOL) suppository 650 mg  650 mg Rectal Q4H PRN Sabina Youngblood APRN       • bisacodyl (DULCOLAX) suppository 10 mg  10 mg Rectal Daily PRN Sabina Youngblood APRN       • cefepime (MAXIPIME) 2 g/100 mL 0.9% NS (mbp)  2 g Intravenous Q12H Sabina Youngblood APRN       • furosemide (LASIX) injection 20 mg  20 mg Intravenous Q6H PRN Sabina Youngblood APRN       • haloperidol lactate (HALDOL) injection 2 mg  2 mg Intravenous Q6H PRN Sabina Youngblood APRN       • ketorolac (TORADOL) injection 15 mg  15 mg Intravenous Q6H PRN Sabina Youngblood APRN       • LORazepam (ATIVAN) injection 1 mg  1 mg Intravenous Q4H PRN Sabina Youngblood APRN       • morphine injection 2 mg  2 mg Intravenous Q1H PRN Sabina Youngblood APRN   2 mg at 04/21/23 1639   • ondansetron (ZOFRAN) injection 4 mg  4 mg Intravenous Q6H PRN Sabina Youngblood APRN       • palliative care oral rinse   Mouth/Throat PRN Sabina Youngblood APRN       • polyvinyl alcohol (LIQUIFILM) 1.4 % ophthalmic solution 1 drop  1 drop Both Eyes Q30 Min PRN Sabina Youngblood APRN       • scopolamine patch 1 mg/72 hr  1 patch Transdermal Q72H PRN Sabina Youngblood APRN       • sodium chloride 0.9 % flush 10 mL  10 mL Intravenous PRN  Sabina Youngblood, APRN            •  acetaminophen **OR** acetaminophen **OR** acetaminophen  •  bisacodyl  •  furosemide  •  haloperidol lactate  •  ketorolac  •  LORazepam  •  Morphine  •  ondansetron  •  palliative care oral rinse  •  polyvinyl alcohol  •  Scopolamine  •  sodium chloride  No Known Allergies  No family history on file.  Social History     Socioeconomic History   • Marital status:    Tobacco Use   • Smoking status: Former     Types: Cigarettes   Vaping Use   • Vaping Use: Never used   Substance and Sexual Activity   • Drug use: Never       Objective     Vital Signs  Temp:  [96.8 °F (36 °C)] 96.8 °F (36 °C)  Heart Rate:  [] 113  Resp:  [16-24] 16    PPS: Palliative Performance Scale score as of 4/21/2023, 17:06 EDT is 10% based on the following measures:   Ambulation: Totally bed bound  Activity and Evidence of Disease: Unable to do any work, extensive evidence of disease  Self-Care: Total care  Intake:  Mouth care only  LOC: Drowsy or coma    Physical Exam:  Physical Exam  Vitals and nursing note reviewed. Exam conducted with a chaperone present.   Constitutional:       Appearance: He is ill-appearing and diaphoretic.   HENT:      Mouth/Throat:      Mouth: Mucous membranes are moist.      Pharynx: Oropharynx is clear. No oropharyngeal exudate.   Cardiovascular:      Rate and Rhythm: Normal rate.   Pulmonary:      Effort: Pulmonary effort is normal.      Comments: Nursing reported resp rate <6 on their assessment.  Periods of apnea.   Skin:     General: Skin is warm.   Neurological:      Comments: Opens his eyes to voice.  Seems restless.  No s/s of pain or distress noted.    Psychiatric:         Speech: He is noncommunicative.         Behavior: Behavior is agitated.         Results Reviewed:  LAB RESULTS:      Lab 04/20/23  0439 04/19/23  0406 04/18/23  0448 04/17/23  1937 04/17/23  1309 04/17/23  1044   WBC 8.79 12.03* 10.69 7.59  --  3.96   HEMOGLOBIN 7.8* 7.7* 7.7* 8.7*  --  8.7*    HEMATOCRIT 26.3* 25.4* 25.9* 30.4*  --  30.5*   PLATELETS 131* 133* 141 160  --  99*   NEUTROS ABS  --   --   --   --   --  2.12   IMMATURE GRANS (ABS)  --   --   --   --   --  0.02   LYMPHS ABS  --   --   --   --   --  1.18   MONOS ABS  --   --   --   --   --  0.58   EOS ABS  --   --   --   --   --  0.03   .7* 100.4* 102.0* 104.5*  --  104.8*   PROCALCITONIN  --   --   --   --   --  0.77*   LACTATE  --   --  2.1* 3.6* 1.7 2.3*   PROTIME  --   --   --   --   --  18.9*         Lab 04/20/23  0439 04/19/23  1745 04/19/23  0406 04/18/23  0448 04/17/23  1937 04/17/23  1044   SODIUM 142  --  144 141 144 138   POTASSIUM 3.7 4.4 3.4* 3.8 3.7 3.6   CHLORIDE 111*  --  114* 112* 114* 109*   CO2 18.0*  --  18.0* 16.0* 15.0* 15.0*   ANION GAP 13.0  --  12.0 13.0 15.0 14.0   BUN 21  --  19 18 16 16   CREATININE 0.99  --  0.92 0.94 0.99 0.89   EGFR 71.0  --  77.6 75.6 71.0 79.9   GLUCOSE 92  --  152* 135* 69 62*   CALCIUM 8.6  --  8.0* 7.7* 8.1* 8.2   MAGNESIUM  --   --  2.0 1.9 1.6* 1.8   PHOSPHORUS  --   --  2.8 3.0  --   --    TSH  --   --   --   --   --  2.000         Lab 04/17/23  1044   TOTAL PROTEIN 5.1*   ALBUMIN 2.4*   GLOBULIN 2.7   ALT (SGPT) 9   AST (SGOT) 23   BILIRUBIN 1.5*   ALK PHOS 1,524*         Lab 04/18/23  0448 04/17/23  2348 04/17/23  1937 04/17/23  1309 04/17/23  1044   PROBNP  --   --  11,689.0*  --  10,105.0*   HSTROP T 43* 44* 56* 45* 44*   PROTIME  --   --   --   --  18.9*   INR  --   --   --   --  1.58*             Lab 04/20/23  0439 04/19/23  0406   IRON  --  60   IRON SATURATION  --  67*   TIBC  --  89*   TRANSFERRIN  --  60*   FOLATE 10.70  --    VITAMIN B 12 >2,000*  --          Lab 04/20/23  0443 04/17/23  1930 04/17/23  1008   PH, ARTERIAL  --  7.440 7.363   PCO2, ARTERIAL  --  24.2* 39.1   PO2 ART  --  231.0* 16.8*   FIO2 21 100 21   HCO3 ART  --  16.4* 22.2   BASE EXCESS ART  --  -6.6* -2.9*   CARBOXYHEMOGLOBIN  --  1.7 1.2   CARBOXYHEMOGLOBIN (VENOUS) 1.4  --   --      Brief Urine Lab  Results  (Last result in the past 365 days)      Color   Clarity   Blood   Leuk Est   Nitrite   Protein   CREAT   Urine HCG        04/17/23 1207 Yellow   Clear   Large (3+)   Trace   Positive   30 mg/dL (1+)                 Microbiology Results Abnormal     None            Metabolic encephalopathy      Assessment & Plan     Assessment/Plan:   -Admit to inpatient hospice service 04/21/2023 with Metabolic encephalopathy [G93.41].     -Coordination of care with nursing staff and BCN IDT.     -Pain: Morphine 2 mg IV q 1 hr PRN for pain or dyspnea.     -Dyspnea:  Morphine as above, oxygen via NC PRN     -Nausea/Vomiting: Zofran 4 mg scheduled q 6 hr PRN       -Anxiety/Agitation: Lorazepam 0.5 mg q 4 hr PRN; haldol 1 mg q 4 hr PRN      -Bowel/bladder: bisacodyl supp q day PRN     -Nutrition: diet as tolerated     -ADLs: total care     -Terminal fever: tylenol supp 650 mg q 6 hr PRN. tordal 15 mg IV q 6 hr PRN     -Terminal secretions:  May start PRN robinul/scop patch if needed     -Patient comfort: palliative oral rinse PRN, liquifilm PRN     -Continue cefepime 2g BID X 6 more doses.  Son desires to continue this treatment as long as pt tolerates it.  Will reassess daily for s/s of FVO.     Goals of Care: achieve comfortable death, educate and support family through this process.          Total Visit Time: 50 min   Face to Face Time: 30 min   Total time spent includes time reviewing chart, face-to-face time, counseling patient/family/caregiver, ordering medications/tests/procedures, communicating with other health care professionals, documenting clinical information in the electronic health record, and coordination of care with facility staff and BCN IDT.     Justification for care:  Patient meets criteria for acute in-patient care with required nursing assessment and interventions for symptoms with IV medications.      JAIRON TORRES, MSN, APRN  [facility]  Hospice and Palliative Care Nurse  Practitioner  04/21/23  17:05 EDT

## 2023-04-21 NOTE — PROGRESS NOTES
Clinical Nutrition       Reason for Visit: MDR, Follow-up protocol    Patient Name: Jeramie Ortiz  YOB: 1929  MRN: [id number]  Date of Encounter: 04/21/23 13:06 EDT  Admission date: 4/17/2023  Nutrition Assessment   Admission Diagnosis:  AMS (altered mental status) [R41.82]      Problem List:    Metabolic encephalopathy    Hypothyroidism    Dementia    CKD stage 3, GFR 30-59 ml/min ([facility])    Sepsis due to urinary tract infection ([facility])    Atrial fibrillation with controlled rate ([facility])    Severe Malnutrition ([facility])    Hypoglycemia    Diastolic CHF, chronic    Left lower lobe pneumonia    Hypotension    Anemia      PMH:   He  has a past medical history of CKD (chronic kidney disease) stage 3, GFR 30-59 ml/min (3/24/2023), Dementia (3/24/2023), Diastolic CHF, chronic (4/17/2023), Essential hypertension (3/24/2023), Gout (3/24/2023), and Hypothyroidism (3/24/2023).    PSH:  He  has a past surgical history that includes Replacement total knee bilateral; Prostate surgery; and Cholecystectomy.      Applicable Nutrition Concerns:   Skin:bruised  GI:abdomen soft, nondistended, last bm 4-20    Reported/Observed/Food/Nutrition Related History:     Per RN: family decided upon regular comfort diet vs puree, pt choked after taking meds in applesauce this am, cleared throat for about an hour afterwards    Pt resting in bed, on room air, very frail appearing, appears uncomfortable, SOB    Labs    Labs Reviewed: Yes     Results from last 7 days   Lab Units 04/20/23  0439 04/19/23  1745 04/19/23  0406 04/18/23  0448 04/17/23  1937 04/17/23  1044   GLUCOSE mg/dL 92  --  152* 135* 69 62*   BUN mg/dL 21  --  19 18 16 16   CREATININE mg/dL 0.99  --  0.92 0.94 0.99 0.89   SODIUM mmol/L 142  --  144 141 144 138   CHLORIDE mmol/L 111*  --  114* 112* 114* 109*   POTASSIUM mmol/L 3.7 4.4 3.4* 3.8 3.7 3.6   PHOSPHORUS mg/dL  --   --  2.8 3.0  --   --    MAGNESIUM mg/dL  --   --  2.0 1.9 1.6* 1.8   ALT (SGPT) U/L  --    --   --   --   --  9       Results from last 7 days   Lab Units 04/17/23  1044   ALBUMIN g/dL 2.4*       Results from last 7 days   Lab Units 04/20/23  0722 04/19/23  1954 04/19/23  1625 04/19/23  1055 04/19/23  0713 04/19/23  0022   GLUCOSE mg/dL 85 101 125 123 131* 150*     No results found for: HGBA1C      Results from last 7 days   Lab Units 04/17/23  1937 04/17/23  1044   PROBNP pg/mL 11,689.0* 10,105.0*         Medications    Medications Reviewed: Yes  Pertinent: abx, lovenox, megace, protonix, midodrine, prenatal MVI, celexa    Intake/Ouptut 24 hrs (0701 - 0700)   I&O's Reviewed: Yes     Intake & Output (last day)       04/20 0701  04/21 0700 04/21 0701 04/22 0700    P.O.      I.V. (mL/kg) 20 (0.2)     IV Piggyback 99.9     Total Intake(mL/kg) 119.9 (1.4)     Urine (mL/kg/hr)      Stool      Total Output      Net +119.9           Urine Unmeasured Occurrence 6 x 1 x            Anthropometrics       Height: 69in  Weight: 190lb bedscale (may be skewed by fluid)  BMI: 28 ( may be skewed by fluid)  BMI classification: Overweight: 25.0-29.9kg/m2     Weight change: 30lb wt loss since November per EMR,  14% wt loss, wt loss likely greater than this as pt has significant edema at present and bedscale weight may not reflect dry weight, possible some fluid gain could be from CHF, but unlikely that CHF is the primary cause of such significant edema, most  likely due to malnutrition       Last 15 Recorded Weights    Open Review Flowsheets (more data may exist)    Weight Weight (kg) Weight (lbs) Weight Method                4/17/2023     86.183 kg     190 lb            4/1/2023     86.501 kg     190 lb 11.2 oz     Bed scale       3/31/2023     82.645 kg     82.9 kg     182 lb 3.2 oz     182 lb 12.2 oz     Bed scale       3/28/2023     90.4 kg     199 lb 4.7 oz     Bed scale       3/27/2023     87.1 kg     192 lb 0.3 oz     Bed scale       3/26/2023     88.905 kg     89 kg     196 lb     196 lb 3.4 oz     Bed scale        3/25/2023     80.3 kg     177 lb 0.5 oz     Bed scale       3/24/2023     77.5 kg     98.884 kg     170 lb 13.7 oz     218 lb     Bed scale     Stated         Nutrition Focused Physical Exam     Date: 4-19-23    Pt meets criteria for severe chronic malnutrition with 30lb wt loss over 5 months, 14% wt loss,  severe muscle wasting fat loss, severe edema 3+    Current Nutrition Prescription     PO: Regular diet (Comfort Diet)  Boost Breeze 3x/day    Intake: 25% of 1 meal    Nutrition Diagnosis   Date: 4-19-23, 4-21  Problem Malnutrition    Etiology Per Clinical Status: Dementia/DIarrhea   Signs/Symptoms 30lb wt loss over 5 months, severe muscle wasting, fat loss, severe edema 3+     Problem Swallowing difficulty   Etiology Per Clinical Status   Signs/Symptoms Choking with pills       Goal:   General: Nutrition support treatment, Palliative care  PO: Tolerate PO     Comfort Measures    Nutrition Intervention      Follow treatment progress    Monitoring/Evaluation:   Per protocol, I&O, PO intake, Supplement intake, Pertinent labs, Weight, Skin status, GI status, Symptoms, POC/GOC, Swallow function      Rosario Mace RD  Time Spent: 30min

## 2023-04-21 NOTE — PROGRESS NOTES
Discharge Summary    Patient name: Jeramie Ortiz  CSN: [id number]  MRN: [id number]  : 12/15/1929  Today's date: 2023     Date of Admission: 2023  Date of Discharge:  2023    Admitting Physician: Yashira Ryan MD  Primary Care Provider: Kingsley Downing MD  Consultations:    YASIR Tena palliative care    Admission Diagnosis:   Sepsis due to UTI     Discharge Diagnoses:     Metabolic encephalopathy    Hypothyroidism    Dementia    CKD stage 3, GFR 30-59 ml/min (HCC)    Sepsis due to urinary tract infection (HCC)    Atrial fibrillation with controlled rate (HCC)    Severe Malnutrition (HCC)    Hypoglycemia    Diastolic CHF, chronic    Left lower lobe pneumonia    Hypotension    Anemia      Procedures:         History of Present Illness:  Jeramie Ortiz is a 89 y.o. male recently admitted for septic shock related to urosepsis at [facility] from 3/24/2023 to 2023 requiring ICU stay with vasopressor initiation and treatment with full course of IV Zosyn, ultimately patient convalesced and was transition to [facility] for ongoing therapy.  Patient had been participating and doing well in therapy until approximately 3 to 4 days ago when he became more lethargic.  Patient was agitated, not wanting to eat or drink as much and not wanting to participate in therapy.  Yesterday patient was noted to be slightly aggressive, which is not consistent with his baseline behavior, he was given a small dose of Ativan at [facility] which helped but patient slept most of the rest of the evening.  Poor oral intake and worsening confusion prompted ED evaluation where patient is found again to have UA consistent with UTI.  At the bedside patient is hypotensive 99/62 after receiving 2.5 L bolus in the ED, his fingerstick blood sugar is currently 57 and according to his son who is at bedside he has not had anything to eat or drink today.    Hospital Course:  89 y.o. male with  "history of dementia, CKD 3, chronic HFpEF, hypothyroidism, discharged from [facility] to [facility] on 4/5/2023 after hospitalization for sepsis secondary to urinary tract infection requiring ICU admission and vasopressors.     Patient presented back to [facility] ED from [facility] on 4/17/2023 with AMS, hypoglycemia, and hypotension.  He was initially admitted to the hospital medicine service and started on Zosyn.  Despite 3 L IV fluids and albumin he continued to develop worsening hypotension and was transferred to the intensive care unit on 4/17/2023.  Patient required BiPAP due to increased work of breathing prior to transfer to the ICU.  He improved thereafter and ultimately tolerated room air.     Chest x-ray concerning for left lower lobe infiltrate.     Patient required pressors transiently.  Lactic acid level normalized and he was weaned off of pressors.  Urine cultures growing greater than 100,000 CFU of Enterococcus switched to Rocephin.  Family has decided on comfort measures but continuing antibiotics.    Patient will be transferred to inpatient hospice on 4/21/2023.    Vitals:  /63   Pulse 109   Temp 96.8 °F (36 °C) (Axillary)   Resp 16   Ht 175.3 cm (69\")   Wt 86.2 kg (190 lb)   SpO2 93%   BMI 28.06 kg/m²     Physical Exam:                Constitutional:    Confused, in no acute distress   Head:    Normocephalic, atraumatic   Eyes:            Lids and lashes normal, conjunctivae and sclerae normal.  PER   ENMT:   Ears appear intact with no abnormalities noted       Lips normal.     Neck:   Trachea midline, no JVD   Lungs/Resp:     Normal effort, symmetric chest rise, no crepitus, clear to      auscultation bilaterally.               Heart/CV:     Irregularly irregular and mildly tachycardic, no murmur   Abdomen/GI:     Soft, nontender, nondistended   :     Deferred   Extremities/MSK:   No clubbing or cyanosis.  Trace bilateral lower extremity edema and 1+ upper extremity edema.  "    Pulses:   Pulses palpable and equal bilaterally   Skin:   No bleeding, bruising or rash   Heme/Lymph:   No cervical or supraclavicular adenopathy.   Neurologic:     Psychiatric:      Moves all extremities with no obvious focal motor deficit.  Confused and a bit restless.  Speech dysarthric.  A bit restless, not currently agitated.        Labs:  Results from last 7 days   Lab Units 04/20/23  0439   WBC 10*3/mm3 8.79   HEMOGLOBIN g/dL 7.8*   HEMATOCRIT % 26.3*   PLATELETS 10*3/mm3 131*     Results from last 7 days   Lab Units 04/20/23  0439 04/17/23  1937 04/17/23  1044   SODIUM mmol/L 142   < > 138   POTASSIUM mmol/L 3.7   < > 3.6   CHLORIDE mmol/L 111*   < > 109*   CO2 mmol/L 18.0*   < > 15.0*   BUN mg/dL 21   < > 16   CREATININE mg/dL 0.99   < > 0.89   CALCIUM mg/dL 8.6   < > 8.2   BILIRUBIN mg/dL  --   --  1.5*   ALK PHOS U/L  --   --  1,524*   ALT (SGPT) U/L  --   --  9   AST (SGOT) U/L  --   --  23   GLUCOSE mg/dL 92   < > 62*    < > = values in this interval not displayed.         Magnesium   Date Value Ref Range Status   04/19/2023 2.0 1.7 - 2.3 mg/dL Final     Phosphorus   Date Value Ref Range Status   04/19/2023 2.8 2.5 - 4.5 mg/dL Final               Discharge Medications:     Discharge Medications      ASK your doctor about these medications      Instructions Start Date   acetaminophen 325 MG tablet  Commonly known as: TYLENOL   650 mg, Oral, Every 4 Hours PRN, OTC      bisacodyl 5 MG EC tablet  Commonly known as: DULCOLAX   10 mg, Oral, Daily PRN, OTC      bisacodyl 10 MG suppository  Commonly known as: DULCOLAX   10 mg, Rectal, Daily PRN, OTC      busPIRone 5 MG tablet  Commonly known as: BUSPAR   5 mg, Oral, 3 Times Daily      cholestyramine 4 g packet  Commonly known as: QUESTRAN   1 packet, Oral, 2 Times Daily With Meals, OTC      citalopram 10 MG tablet  Commonly known as: CeleXA   10 mg, Oral, Every Night at Bedtime      Diclofenac Sodium 1 % gel gel  Commonly known as: VOLTAREN   2 g, Topical, 4  Times Daily      folic acid 0.5 MG half tablet  Commonly known as: FOLVITE  Ask about: Which instructions should I use?   0.5 mg, Oral, Daily      heparin (porcine) 1000 UNIT/ML injection   5,000 Units, Subcutaneous, Every 12 Hours      levothyroxine 50 MCG tablet  Commonly known as: SYNTHROID, LEVOTHROID   1 tablet, Oral, Daily      loperamide 2 MG capsule  Commonly known as: IMODIUM   2 capsules, Oral, Daily      megestrol 40 MG/ML suspension  Commonly known as: MEGACE   800 mg, Oral, Daily      melatonin 3 MG tablet   6 mg, Oral, Nightly, OTC      midodrine 10 MG tablet  Commonly known as: PROAMATINE   10 mg, Oral, 3 Times Daily Before Meals      ondansetron ODT 4 MG disintegrating tablet  Commonly known as: ZOFRAN-ODT   4 mg, Translingual, Every 6 Hours PRN      pantoprazole 40 MG EC tablet  Commonly known as: PROTONIX   40 mg, Oral, Daily      polyethylene glycol 17 g packet  Commonly known as: MIRALAX   17 g, Oral, Daily PRN, OTC      traMADol 50 MG tablet  Commonly known as: ULTRAM   50 mg, Oral, Every 8 Hours PRN             Discharge Diet:    Per hospice, likely comfort diet    Activity at Discharge:  Per hospice    Follow-up Appointments  No future appointments.  [unfilled]    Discharge Instructions:  Per hospice    Current Code Status     Date Active Code Status Order ID Comments User Context       4/20/2023 1147 No CPR (Do Not Attempt to Resuscitate) [id number]  Hilda Salas, APRN Inpatient      Question Answer    Code Status (Patient has no pulse and is not breathing) No CPR (Do Not Attempt to Resuscitate)    Medical Interventions (Patient has pulse or is breathing) Comfort Measures    Level Of Support Discussed With Next of Kin (If No Surrogate)              Electronically signed by:  Anish De La Fuente MD, MD  Pulmonary & Critical Care Medicine  04/21/23 15:08 EDT    Time: Discharge 22 min    CC: Kingsley Downing MD              Please note that portions of this note were completed with a voice  recognition program.

## 2023-04-21 NOTE — PLAN OF CARE
Goal Outcome Evaluation:  Plan of Care Reviewed With: (P) patient        Progress: (P) no change  Outcome Evaluation: (P) Palliative Care Patient. No changes through the night. Pt on room air w/ wheezing, 3 lg urine incontinence episodes noted , Morphine & Haledol given earlier in shift. pt rested well. Able to get the patient to take oral meds this morning. Transfer orders in place awaiting bed availability.

## 2023-04-21 NOTE — DISCHARGE PLACEMENT REQUEST
"Ramin Watts (89 y.o. Male)     Date of Birth   12/15/1929    Social Security Number       Address   [address] [place] [address]    Home Phone   [phone]    MRN   [id number]       Adventism   [employer]    Marital Status                               Admission Date   4/17/23    Admission Type   Emergency    Admitting Provider   Case, Marisel MORGAN DO    Attending Provider   Jc, Marisel MORGAN DO    Department, Room/Bed   [facility] 2A ICU, N217/1       Discharge Date       Discharge Disposition       Discharge Destination                               Attending Provider: Marisel Greene DO    Allergies: No Known Allergies    Isolation: Contact   Infection: Candida Auris (rule out) (04/17/23)   Code Status: No CPR    Ht: 175.3 cm (69\")   Wt: 86.2 kg (190 lb)    Admission Cmt: None   Principal Problem: Metabolic encephalopathy [G93.41]                 Active Insurance as of 4/17/2023     Primary Coverage     Payor Plan Insurance Group Employer/Plan Group    MEDICARE MEDICARE A ONLY      Payor Plan Address Payor Plan Phone Number Payor Plan Fax Number Effective Dates    [address] [phone]  12/1/1994 - None Entered    [facility] [id number]       Subscriber Name Subscriber Birth Date Member ID       RAMIN WATTS 12/15/1929 [id number]           Secondary Coverage     Payor Plan Insurance Group Employer/Plan Group    [place] [id number]     Payor Plan Address Payor Plan Phone Number Payor Plan Fax Number Effective Dates    [address]   1/1/2023 - None Entered    [facility] [id number]       Subscriber Name Subscriber Birth Date Member ID       RAMIN WATTS 12/15/1929 [id number]                 Emergency Contacts      (Rel.) Home Phone Work Phone Mobile Phone    Nelson Watts (Son) [phone] -- [phone]            Emergency Contact Information     Name Relation Home Work Mobile    Nelson Watts Son [phone]          Insurance " Information                MEDICARE/MEDICARE A ONLY Phone: [phone]    Subscriber: Jeramie Ortiz Subscriber#: [id number]    Group#: -- Precert#: --        [employer] [facility] Phone: --    Subscriber: Jeramie Ortiz Subscriber#: [id number]    Group#: [id number] Precert#: --             History & Physical      Yashira Ryan MD at 23 1634              [facility] Medicine Services  HISTORY AND PHYSICAL    Patient Name: Jeramie Ortiz  : 12/15/1929  MRN: [id number]  Primary Care Physician: Kingsley Downing MD  Date of admission: 2023      Subjective    Subjective     Chief Complaint:  UTI, hypotension    HPI:  Jeramie Ortiz is a 89 y.o. male recently admitted for septic shock related to urosepsis at [facility] from 3/24/2023 to 2023 requiring ICU stay with vasopressor initiation and treatment with full course of IV Zosyn, ultimately patient convalesced and was transition to [facility] for ongoing therapy.  Patient had been participating and doing well in therapy until approximately 3 to 4 days ago when he became more lethargic.  Patient was agitated, not wanting to eat or drink as much and not wanting to participate in therapy.  Yesterday patient was noted to be slightly aggressive, which is not consistent with his baseline behavior, he was given a small dose of Ativan at [facility] which helped but patient slept most of the rest of the evening.  Poor oral intake and worsening confusion prompted ED evaluation where patient is found again to have UA consistent with UTI.  At the bedside patient is hypotensive 99/62 after receiving 2.5 L bolus in the ED, his fingerstick blood sugar is currently 57 and according to his son who is at bedside he has not had anything to eat or drink today.      Review of Systems   UTO reliably due to baseline dementia and currently with metabolic encephalopathy    Personal History     Past Medical History:    Diagnosis Date   • CKD (chronic kidney disease) stage 3, GFR 30-59 ml/min 3/24/2023   • Dementia 3/24/2023   • Diastolic CHF, chronic 4/17/2023   • Essential hypertension 3/24/2023   • Gout 3/24/2023   • Hypothyroidism 3/24/2023         Past Surgical History:   Procedure Laterality Date   • CHOLECYSTECTOMY     • PROSTATE SURGERY     • REPLACEMENT TOTAL KNEE BILATERAL         Family History: family history is not on file.     Social History:  reports that he has quit smoking. His smoking use included cigarettes. He does not have any smokeless tobacco history on file. He reports that he does not use drugs.  Social History     Social History Narrative   • Not on file       Medications:  Available home medication information reviewed.  (Not in a hospital admission)      No Known Allergies    Objective    Objective     Vital Signs:   Temp:  [98.2 °F (36.8 °C)] 98.2 °F (36.8 °C)  Heart Rate:  [] 111  Resp:  [18] 18  BP: ()/(47-92) 106/55       Physical Exam   Constitutional: No acute distress, awake, laying in bed with eyes closed but will interact briefly, slightly groggy  HENT: NCAT, mucous membranes dry  Respiratory: Clear to auscultation bilaterally, nonlabored on 2 L, dull at bases, no rhonchi or wheezes appreciated as well as no crackles cardiovascular: RRR, no murmur  Gastrointestinal:  soft, nontender, nondistended  Musculoskeletal: Trace chronic foot and ankle peripheral edema thickened skin of venous stasis changes, normal muscle tone for age  Psychiatric: Metabolic encephalopathy but will interact briefly          LAB RESULTS:      Lab 04/17/23  1309 04/17/23  1044   WBC  --  3.96   HEMOGLOBIN  --  8.7*   HEMATOCRIT  --  30.5*   PLATELETS  --  99*   NEUTROS ABS  --  2.12   IMMATURE GRANS (ABS)  --  0.02   LYMPHS ABS  --  1.18   MONOS ABS  --  0.58   EOS ABS  --  0.03   MCV  --  104.8*   PROCALCITONIN  --  0.77*   LACTATE 1.7 2.3*   PROTIME  --  18.9*   INR  --  1.58*         Lab 04/17/23  1044    SODIUM 138   POTASSIUM 3.6   CHLORIDE 109*   CO2 15.0*   ANION GAP 14.0   BUN 16   CREATININE 0.89   EGFR 79.9   GLUCOSE 62*   CALCIUM 8.2   MAGNESIUM 1.8   TSH 2.000         Lab 04/17/23  1044   TOTAL PROTEIN 5.1*   ALBUMIN 2.4*   GLOBULIN 2.7   ALT (SGPT) 9   AST (SGOT) 23   BILIRUBIN 1.5*   ALK PHOS 1,524*         Lab 04/17/23  1309 04/17/23  1044   PROBNP  --  10,105.0*   HSTROP T 45* 44*                 Lab 04/17/23  1008   PH, ARTERIAL 7.363   PCO2, ARTERIAL 39.1   PO2 ART 16.8*   FIO2 21   HCO3 ART 22.2   BASE EXCESS ART -2.9*   CARBOXYHEMOGLOBIN 1.2     UA        1/14/2023    11:08 3/24/2023    17:55 4/17/2023    12:07   Urinalysis   Squamous Epithelial Cells, UA 3-6   0-2   3-6     Specific Gravity, UA 1.018   1.020   1.020     Ketones, UA Negative   Negative   Trace     Blood, UA Negative   Moderate (2+)   Large (3+)     Leukocytes, UA Negative   Negative   Trace     Nitrite, UA Negative   Positive   Positive     RBC, UA 3-6   Too Numerous to Count   Too Numerous to Count     WBC, UA 0-2   Too Numerous to Count   6-12     Bacteria, UA None Seen   1+   3+         Microbiology Results (last 10 days)     ** No results found for the last 240 hours. **          CT Head Without Contrast    Result Date: 4/17/2023  CT HEAD WO CONTRAST Date of Exam: 4/17/2023 9:16 AM EDT Indication: ams. Comparison: MRI brain 3/24/2023 Technique: Axial CT images were obtained of the head without contrast administration.  Reconstructed coronal and sagittal images were also obtained. Automated exposure control and iterative construction methods were used. Findings: Parenchyma:No acute intraparenchymal hemorrhage. No loss of gray-white differentiation to suggest large territory infarct. Mild parenchymal volume loss. No substantial white matter disease. No midline shift or herniation. Ventricles and extra axial spaces:Prominent ventricles and sulci secondary to volume loss. No extra axial fluid collection seen. Other: Left lens  replacement. Paranasal sinuses are clear. Mastoid air cells are clear. Calvarium is intact. Intracranial atherosclerotic calcification is present.     Impression: Impression: No evidence of acute intracranial hemorrhage or large territorial infarct. Electronically Signed: Willy Harris  4/17/2023 9:41 AM EDT  Workstation ID: [id number]    XR Chest 1 View    Result Date: 4/17/2023  XR CHEST 1 VW Date of Exam: 4/17/2023 9:43 AM EDT Indication: Altered mental status Comparison: 3/26/2023 Findings: Redemonstrated elevation of the left hemidiaphragm. There are increased bibasilar opacities, the appearance of at least moderate pleural effusion on the right, and likely bilateral lower lobe airspace disease. There is no distinct pneumothorax. Unchanged  heart and mediastinal contours.     Impression: Impression: Increased pleural effusions and basilar opacities, pneumonia versus edema pattern. Electronically Signed: Landen Sylvester  4/17/2023 10:10 AM EDT  Workstation ID: [id number]      Results for orders placed during the hospital encounter of 03/24/23    Adult Transthoracic Echo Complete W/ Cont if Necessary Per Protocol    Interpretation Summary  •  Left ventricular systolic function is normal. Estimated left ventricular EF = 60%  •  The right ventricular cavity is dilated.  •  No significant valvular disease identified.  •  Estimated right ventricular systolic pressure from tricuspid regurgitation is mildly elevated (35-45 mmHg).  •  There is a trivial pericardial effusion.      Assessment & Plan   Assessment & Plan     Active Hospital Problems    Diagnosis  POA   • **Metabolic encephalopathy [G93.41]  Yes   • Hypoglycemia [E16.2]  Yes   • Diastolic CHF, chronic [I50.32]  Yes   • Severe Malnutrition (HCC) [E43]  Yes   • Sepsis due to urinary tract infection (HCC) [A41.9, N39.0]  Yes   • Hypothyroidism [E03.9]  Yes   • Essential hypertension [I10]  Yes   • Dementia [F03.90]  Yes   • CKD stage 3, GFR 30-59 ml/min ([facility])  [N18.30]  Yes   • Atrial fibrillation with controlled rate (HCC) [I48.91]  Yes       Sepsis secondary to UTI (AMS, hypoglycemia, hypotension, tachycardia, ill appearing)  Metabolic encephalopathy  UTI  -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, possible predisposition for recurrent UTIs  -During last hospitalization from 3/24/2023 to 4/5/2023 required ICU with vasopressor support for septic shock related to UTI (culture negative, no pathogen identified but treated with empiric Zosyn based on urosepsis protocol)  -Culture pending  -2.5 L bolus given in the ED, currently bedside blood pressure 99/62 --> repeat 1 L bolus, will give pulse dose Solu-Cortef x3 doses for probable acute illness adrenal insufficiency given patient's advanced age, prolonged illnesses, acute infection, etc  -Abx choice discussion:  Returns with acute UTI, clinically cleared prior infection on Zosyn (do not feel this is refractory infection), per sepsis protocol will use Zosyn given extended coverage based on local antibiogram data.  Cx pending.....    Chronic diastolic CHF  -Echo from last hospitalization reviewed EF 60%  -Dilated right ventricle and mildly elevated RVSP  -Elevated BNP  -Mild pulmonary edema seen on chest imaging likely extravasation secondary to hypotension and vasodilation of sepsis    HTN   Hypothyroid   Dementia   Afib   -Has HTN on his history however does not take currently any blood pressure medications, also noting he is on 3 times daily midodrine which we will continue  -Continue home Synthroid        DVT prophylaxis: Lovenox      CODE STATUS: DNR/DNI  Code Status and Medical Interventions:   Ordered at: 04/17/23 1634     Medical Intervention Limits:    NO intubation (DNI)     Code Status (Patient has no pulse and is not breathing):    No CPR (Do Not Attempt to Resuscitate)     Medical Interventions (Patient has pulse or is breathing):    Limited Support       Expected Discharge  4/20/23(     Yashira Ryan MD  04/17/23      Electronically signed by Yashira Ryan MD at 04/17/23 6502

## 2023-04-21 NOTE — CASE MANAGEMENT/SOCIAL WORK
"Continued Stay Note  [facility]     Patient Name: Jeramie Ortiz  MRN: [id number]  Today's Date: 4/21/2023    Admit Date: 4/17/2023    Plan: TBD   Discharge Plan     Row Name 04/21/23 1226       Plan    Plan TBD    Plan Comments Per Palliative Care/ goals of care discussion with son, \"Discussed patient's status/prognosis, increased confusion, weakness, agitation/restlessness.  Goal of comfort established, code status changed  and comfort medications ordered.\" Mr. Ortiz will likely be transferred to  soon.  Per Cecy with [facility], they do not have long term care beds available at this time.  CM will cont to follow the plan of care and assist with discharge planning as recommendations become available.    Final Discharge Disposition Code 30 - still a patient               Discharge Codes    No documentation.                     Brittany Woodard RN    "

## 2023-04-22 NOTE — PROGRESS NOTES
Hospice Progress Note    Patient Name: Jeramie Ortiz   : 12/15/1929  Gender: male    Code Status: comfort measures    Date of Admission: 2023    Subjective:  89 yoM admitted to inpatient hospice 23 for metabolic encephalopathy.     No family at bedside today.  Pt having increased pain and anxiety/agitation this am.  Edema worse in both hands and feet today.  Extremities cool to touch.  Increase in resp rate.  New onset rales overnight.      - Scheduled:    - PRNs:  Morphine 2 mg x 5   Lasix 20 mg x 1   toradol x 1   Haldol 2 mg x 2     - Intake/Output  Intake & Output (last 3 days)        07 07 07 07 07 07 07 07            Urine Unmeasured Occurrence   6 x              ROS:  Review of Systems   Unable to perform ROS: Acuity of condition       Reviewed current scheduled and prn medications for route, type, dose and frequency.     •  acetaminophen **OR** acetaminophen **OR** acetaminophen  •  bisacodyl  •  furosemide  •  haloperidol lactate  •  ketorolac  •  LORazepam  •  Morphine  •  ondansetron  •  palliative care oral rinse  •  polyvinyl alcohol  •  Scopolamine  •  sodium chloride    Objective:   Pulse 89   Resp (!) 5      PPS: Palliative Performance Scale score as of 2023, 15:55 EDT is 10% based on the following measures:   Ambulation: Totally bed bound  Activity and Evidence of Disease: Unable to do any work, extensive evidence of disease  Self-Care: Total care  Intake:  Mouth care only  LOC: Drowsy or coma    Physical Exam:  Physical Exam  Constitutional:       Appearance: He is ill-appearing and diaphoretic.   HENT:      Mouth/Throat:      Mouth: Mucous membranes are moist.      Pharynx: Oropharynx is clear. No oropharyngeal exudate.   Cardiovascular:      Rate and Rhythm: Normal rate.   Pulmonary:      Effort: Pulmonary effort is normal. No respiratory distress.      Breath sounds: No wheezing or rales.   Abdominal:       General: Abdomen is flat. Bowel sounds are normal. There is no distension.      Tenderness: There is no abdominal tenderness. There is no guarding.   Musculoskeletal:         General: Swelling present.      Right lower leg: Edema present.      Left lower leg: Edema present.   Skin:     General: Skin is warm.   Neurological:      Mental Status: He is unresponsive.   Psychiatric:         Speech: He is noncommunicative.             Metabolic encephalopathy      Assessment/Plan:   Jeramie Ortiz is a 89 y.o. male admitted to inpatient hospice 04/21/2023 with diagnosis of Metabolic encephalopathy [G93.41]  for symptom management.     Symptoms:  Pain  Anxiety/agitation/restlessness   Secretions   Edema     Discharge Disposition: [facility]     -Add scheduled dose of morphine 2 mg q 6 hrs.  Pt required 5 PRN doses of 2 mg (10 mg) in 24 hrs.     -Discontinue IV abx.     Total Visit Time: 20 min   Face to Face Time: 10 min     Justification for care:  Patient meets criteria for acute in-patient care due to need for frequent skilled nursing assessments to determine patient comfort and medication effectiveness at end of life.  Frequent adjustments to medications and interventions for symptom management, including injectable medications.      Sabina Youngblood, MSN, APRN  [facility]  Hospice and Palliative Care Nurse Practitioner  04/22/23  14:29 EDT

## 2023-04-22 NOTE — PLAN OF CARE
Goal Outcome Evaluation:Family members arrived @ 1600.  He began to twitch in response to their conversation.  PRN medication  given.  Remains incontinent of both bowel & bladder. No supplemental oxygen in use.  Will continue to reassess and administer PRN medications as needed.

## 2023-04-22 NOTE — PROGRESS NOTES
Continued Stay Note  [facility]     Patient Name: Jeramie Ortiz  MRN: [id number]  Today's Date: 4/22/2023    Admit Date: 4/21/2023    Plan: Inpatient hospice   Discharge Plan     Row Name 04/22/23 1131       Plan    Plan Inpatient hospice    Plan Comments OC is a 89yo AA male 10% pps with a terminal dx of Metabolic encephalopathy. Visit to bedside. Noted unresponsive. He is restless, moving extremities non purposefully at present. Grimace noted. He has just been medicated with prn morphine and one hour ago medicated with prn haldol. Writer speaks to patient in calm voice, strokes forehead, and reassures patient that he is safe, cared for. Patient transferred to  in [facility]. LBM: 4/22/23. PRN's: morphine x3, lasix x1, toradol x1. Care coordinated with Hernan Mccormick. Writer called and contacted patient's son, Nelson, and updated, discussed assess, condition, eol signs/symptoms, medications, what to possibly expect from this point forward, as well as offered ongoing support, and open communication. He verbalized understanding and relayed is to visit later today. Updated VAryan COOLEY.    Final Discharge Disposition Code 51 - hospice medical facility               Discharge Codes    No documentation.                     Lizzie Wood RN

## 2023-04-23 LAB — BACTERIA ISLT: NORMAL

## 2023-04-23 NOTE — DISCHARGE SUMMARY
Date of Admission: 04/21/2023   Date and Time of Death: 4/22/2023 at 11:39 PM    Hospital Course:  Jeramie Ortiz is a 89 y.o. male admitted to inpatient hospice with diagnosis of Metabolic encephalopathy [G93.41]  for symptom management. Medications were available throughout admission for symptom management and comfort. Patient continued to decline after admission as expected ultimately to their death on 4/22/2023 at 11:39 PM. Patient was pronounced dead by Clinical House Supervisor. Spiritual and psychosocial support were available to patient and family throughout admission. Patient's remains were released per [facility] protocol.    Sabina Youngblood, MSN, APRN  [facility]  Hospice and Palliative Care Nurse Practitioner  04/23/23  10:43 EDT

## 2023-04-23 NOTE — SIGNIFICANT NOTE
Exam confirms with auscultation zero audible heart tones and zero audible respirations. Mr. Jeramie Ortiz was pronounced dead at 2339.  MD notified by Patient’s RN    Haylee Hollins RN  Clinical House Supervisor  4/23/23 0005

## 2023-05-02 LAB — BACTERIA SPEC AEROBE CULT: ABNORMAL
